# Patient Record
Sex: MALE | Race: ASIAN | NOT HISPANIC OR LATINO
[De-identification: names, ages, dates, MRNs, and addresses within clinical notes are randomized per-mention and may not be internally consistent; named-entity substitution may affect disease eponyms.]

---

## 2017-01-06 ENCOUNTER — RX RENEWAL (OUTPATIENT)
Age: 67
End: 2017-01-06

## 2017-02-15 ENCOUNTER — APPOINTMENT (OUTPATIENT)
Dept: NEPHROLOGY | Facility: CLINIC | Age: 67
End: 2017-02-15

## 2017-02-15 VITALS
WEIGHT: 152.42 LBS | BODY MASS INDEX: 25.4 KG/M2 | SYSTOLIC BLOOD PRESSURE: 132 MMHG | DIASTOLIC BLOOD PRESSURE: 76 MMHG | OXYGEN SATURATION: 99 % | HEART RATE: 61 BPM | HEIGHT: 65 IN

## 2017-02-16 LAB
APPEARANCE: CLEAR
BACTERIA: NEGATIVE
BILIRUBIN URINE: NEGATIVE
BLOOD URINE: NEGATIVE
COLOR: YELLOW
CREAT SPEC-SCNC: 33 MG/DL
CREAT/PROT UR: 0.2 RATIO
GLUCOSE QUALITATIVE U: NORMAL MG/DL
KETONES URINE: NEGATIVE
LEUKOCYTE ESTERASE URINE: NEGATIVE
MICROSCOPIC-UA: NORMAL
NITRITE URINE: NEGATIVE
PH URINE: 6.5
PROT UR-MCNC: 6 MG/DL
PROTEIN URINE: NEGATIVE MG/DL
RED BLOOD CELLS URINE: 1 /HPF
SPECIFIC GRAVITY URINE: 1.01
SQUAMOUS EPITHELIAL CELLS: 0 /HPF
UROBILINOGEN URINE: NORMAL MG/DL
WHITE BLOOD CELLS URINE: 0 /HPF

## 2017-02-21 ENCOUNTER — MEDICATION RENEWAL (OUTPATIENT)
Age: 67
End: 2017-02-21

## 2017-03-02 ENCOUNTER — APPOINTMENT (OUTPATIENT)
Dept: NEPHROLOGY | Facility: CLINIC | Age: 67
End: 2017-03-02

## 2017-04-05 ENCOUNTER — RX RENEWAL (OUTPATIENT)
Age: 67
End: 2017-04-05

## 2017-05-15 ENCOUNTER — APPOINTMENT (OUTPATIENT)
Dept: INFECTIOUS DISEASE | Facility: CLINIC | Age: 67
End: 2017-05-15

## 2017-05-15 VITALS
HEIGHT: 65 IN | WEIGHT: 156 LBS | HEART RATE: 66 BPM | OXYGEN SATURATION: 95 % | TEMPERATURE: 97.9 F | SYSTOLIC BLOOD PRESSURE: 134 MMHG | DIASTOLIC BLOOD PRESSURE: 77 MMHG | BODY MASS INDEX: 25.99 KG/M2 | RESPIRATION RATE: 16 BRPM

## 2017-05-18 ENCOUNTER — APPOINTMENT (OUTPATIENT)
Dept: INFECTIOUS DISEASE | Facility: CLINIC | Age: 67
End: 2017-05-18

## 2017-05-18 ENCOUNTER — MED ADMIN CHARGE (OUTPATIENT)
Age: 67
End: 2017-05-18

## 2017-05-18 LAB
25(OH)D3 SERPL-MCNC: 30.7 NG/ML
ALBUMIN SERPL ELPH-MCNC: 4.2 G/DL
ALP BLD-CCNC: 36 U/L
ALT SERPL-CCNC: 7 U/L
ANION GAP SERPL CALC-SCNC: 18 MMOL/L
APPEARANCE: CLEAR
AST SERPL-CCNC: 12 U/L
BACTERIA: NEGATIVE
BASOPHILS # BLD AUTO: 0.02 K/UL
BASOPHILS NFR BLD AUTO: 0.4 %
BILIRUB SERPL-MCNC: 2.3 MG/DL
BILIRUBIN URINE: NEGATIVE
BLOOD URINE: NEGATIVE
BUN SERPL-MCNC: 34 MG/DL
CALCIUM SERPL-MCNC: 9.8 MG/DL
CHLORIDE SERPL-SCNC: 102 MMOL/L
CHOLEST SERPL-MCNC: 191 MG/DL
CHOLEST/HDLC SERPL: 4.2 RATIO
CO2 SERPL-SCNC: 20 MMOL/L
COLOR: YELLOW
CREAT SERPL-MCNC: 1.68 MG/DL
CREAT SPEC-SCNC: 110 MG/DL
CREAT/PROT UR: 0.1 RATIO
EOSINOPHIL # BLD AUTO: 0.3 K/UL
EOSINOPHIL NFR BLD AUTO: 5.8 %
GLUCOSE QUALITATIVE U: NORMAL MG/DL
GLUCOSE SERPL-MCNC: 94 MG/DL
HBA1C MFR BLD HPLC: 6.2 %
HCT VFR BLD CALC: 42.4 %
HDLC SERPL-MCNC: 46 MG/DL
HGB BLD-MCNC: 13.7 G/DL
HYALINE CASTS: 1 /LPF
IMM GRANULOCYTES NFR BLD AUTO: 0.2 %
INR PPP: 1.66 RATIO
KETONES URINE: NEGATIVE
LDLC SERPL CALC-MCNC: 94 MG/DL
LEUKOCYTE ESTERASE URINE: NEGATIVE
LYMPHOCYTES # BLD AUTO: 1.74 K/UL
LYMPHOCYTES NFR BLD AUTO: 33.9 %
MAN DIFF?: NORMAL
MCHC RBC-ENTMCNC: 31.6 PG
MCHC RBC-ENTMCNC: 32.3 GM/DL
MCV RBC AUTO: 97.7 FL
MICROSCOPIC-UA: NORMAL
MONOCYTES # BLD AUTO: 0.43 K/UL
MONOCYTES NFR BLD AUTO: 8.4 %
NEUTROPHILS # BLD AUTO: 2.64 K/UL
NEUTROPHILS NFR BLD AUTO: 51.3 %
NITRITE URINE: NEGATIVE
PH URINE: 5.5
PHOSPHATE SERPL-MCNC: 2.6 MG/DL
PLATELET # BLD AUTO: 205 K/UL
POTASSIUM SERPL-SCNC: 4.2 MMOL/L
PROT SERPL-MCNC: 7.3 G/DL
PROT UR-MCNC: 15 MG/DL
PROTEIN URINE: ABNORMAL MG/DL
PT BLD: 18.9 SEC
RBC # BLD: 4.34 M/UL
RBC # FLD: 13.8 %
RED BLOOD CELLS URINE: 2 /HPF
SODIUM SERPL-SCNC: 140 MMOL/L
SPECIFIC GRAVITY URINE: 1.02
SQUAMOUS EPITHELIAL CELLS: 0 /HPF
TRIGL SERPL-MCNC: 253 MG/DL
URATE SERPL-MCNC: 5.9 MG/DL
UROBILINOGEN URINE: NORMAL MG/DL
WBC # FLD AUTO: 5.14 K/UL
WHITE BLOOD CELLS URINE: 1 /HPF

## 2017-05-19 ENCOUNTER — RESULT REVIEW (OUTPATIENT)
Age: 67
End: 2017-05-19

## 2017-05-19 LAB
C TRACH RRNA SPEC QL NAA+PROBE: NORMAL
CD3 CELLS # BLD: 1226 /UL
CD3 CELLS NFR BLD: 68 %
CD3+CD4+ CELLS # BLD: 509 /UL
CD3+CD4+ CELLS NFR BLD: 28 %
CD3+CD4+ CELLS/CD3+CD8+ CLL SPEC: 0.72 RATIO
CD3+CD8+ CELLS # SPEC: 705 /UL
CD3+CD8+ CELLS NFR BLD: 39 %
HCV AB SER QL: NONREACTIVE
HCV S/CO RATIO: 0.15 S/CO
N GONORRHOEA RRNA SPEC QL NAA+PROBE: NORMAL
PSA SERPL-MCNC: 0.59 NG/ML
SOURCE AMPLIFICATION: NORMAL
T PALLIDUM AB SER QL IA: NEGATIVE

## 2017-05-22 LAB
ADJUSTED MITOGEN: >10 IU/ML
ADJUSTED TB AG: 0.02 IU/ML
HIV1 RNA # SERPL NAA+PROBE: NOT DETECTED COPIES/ML
M TB IFN-G BLD-IMP: NEGATIVE
QUANTIFERON GOLD NIL: 0.11 IU/ML
VIRAL LOAD LOG: NOT DETECTED LG10COP/ML

## 2017-05-31 ENCOUNTER — TRANSCRIPTION ENCOUNTER (OUTPATIENT)
Age: 67
End: 2017-05-31

## 2017-06-14 ENCOUNTER — RESULT REVIEW (OUTPATIENT)
Age: 67
End: 2017-06-14

## 2017-06-15 ENCOUNTER — APPOINTMENT (OUTPATIENT)
Dept: ELECTROPHYSIOLOGY | Facility: CLINIC | Age: 67
End: 2017-06-15

## 2017-06-15 ENCOUNTER — NON-APPOINTMENT (OUTPATIENT)
Age: 67
End: 2017-06-15

## 2017-06-15 ENCOUNTER — APPOINTMENT (OUTPATIENT)
Dept: CARDIOLOGY | Facility: CLINIC | Age: 67
End: 2017-06-15

## 2017-06-15 VITALS
HEIGHT: 65 IN | WEIGHT: 155 LBS | HEART RATE: 66 BPM | OXYGEN SATURATION: 98 % | BODY MASS INDEX: 25.83 KG/M2 | DIASTOLIC BLOOD PRESSURE: 87 MMHG | SYSTOLIC BLOOD PRESSURE: 149 MMHG

## 2017-06-15 RX ORDER — CICLOPIROX 80 MG/ML
8 SOLUTION TOPICAL
Qty: 6 | Refills: 0 | Status: DISCONTINUED | COMMUNITY
Start: 2017-04-18

## 2017-06-15 RX ORDER — CLOBETASOL PROPIONATE 0.5 MG/G
0.05 OINTMENT TOPICAL
Qty: 60 | Refills: 0 | Status: DISCONTINUED | COMMUNITY
Start: 2017-01-12

## 2017-06-16 ENCOUNTER — TRANSCRIPTION ENCOUNTER (OUTPATIENT)
Age: 67
End: 2017-06-16

## 2017-06-16 ENCOUNTER — MEDICATION RENEWAL (OUTPATIENT)
Age: 67
End: 2017-06-16

## 2017-08-10 ENCOUNTER — RX RENEWAL (OUTPATIENT)
Age: 67
End: 2017-08-10

## 2017-08-17 ENCOUNTER — APPOINTMENT (OUTPATIENT)
Dept: NEPHROLOGY | Facility: CLINIC | Age: 67
End: 2017-08-17
Payer: COMMERCIAL

## 2017-08-17 VITALS — SYSTOLIC BLOOD PRESSURE: 144 MMHG | DIASTOLIC BLOOD PRESSURE: 70 MMHG

## 2017-08-17 VITALS
WEIGHT: 157.63 LBS | HEIGHT: 65 IN | OXYGEN SATURATION: 98 % | SYSTOLIC BLOOD PRESSURE: 153 MMHG | BODY MASS INDEX: 26.26 KG/M2 | DIASTOLIC BLOOD PRESSURE: 75 MMHG | HEART RATE: 62 BPM

## 2017-08-17 PROCEDURE — 99215 OFFICE O/P EST HI 40 MIN: CPT

## 2017-09-07 ENCOUNTER — RX RENEWAL (OUTPATIENT)
Age: 67
End: 2017-09-07

## 2017-09-18 ENCOUNTER — APPOINTMENT (OUTPATIENT)
Dept: INFECTIOUS DISEASE | Facility: CLINIC | Age: 67
End: 2017-09-18
Payer: COMMERCIAL

## 2017-09-18 VITALS
WEIGHT: 150 LBS | RESPIRATION RATE: 16 BRPM | OXYGEN SATURATION: 98 % | TEMPERATURE: 97.5 F | HEIGHT: 65 IN | DIASTOLIC BLOOD PRESSURE: 83 MMHG | HEART RATE: 82 BPM | BODY MASS INDEX: 24.99 KG/M2 | SYSTOLIC BLOOD PRESSURE: 136 MMHG

## 2017-09-18 PROCEDURE — 99214 OFFICE O/P EST MOD 30 MIN: CPT

## 2017-09-18 RX ORDER — CEFPODOXIME PROXETIL 200 MG/1
200 TABLET, FILM COATED ORAL
Qty: 20 | Refills: 0 | Status: COMPLETED | COMMUNITY
Start: 2017-08-10

## 2017-09-19 LAB
25(OH)D3 SERPL-MCNC: 29.9 NG/ML
ALBUMIN SERPL ELPH-MCNC: 4.4 G/DL
ALP BLD-CCNC: 41 U/L
ALT SERPL-CCNC: 13 U/L
ANION GAP SERPL CALC-SCNC: 15 MMOL/L
APPEARANCE: CLEAR
AST SERPL-CCNC: 25 U/L
BACTERIA: NEGATIVE
BASOPHILS # BLD AUTO: 0.03 K/UL
BASOPHILS NFR BLD AUTO: 0.6 %
BILIRUB SERPL-MCNC: 2 MG/DL
BILIRUBIN URINE: NEGATIVE
BLOOD URINE: ABNORMAL
BUN SERPL-MCNC: 37 MG/DL
CALCIUM SERPL-MCNC: 10 MG/DL
CD3 CELLS # BLD: 1161 /UL
CD3 CELLS NFR BLD: 71 %
CD3+CD4+ CELLS # BLD: 489 /UL
CD3+CD4+ CELLS NFR BLD: 30 %
CD3+CD4+ CELLS/CD3+CD8+ CLL SPEC: 0.74 RATIO
CD3+CD8+ CELLS # SPEC: 663 /UL
CD3+CD8+ CELLS NFR BLD: 40 %
CHLORIDE SERPL-SCNC: 100 MMOL/L
CO2 SERPL-SCNC: 25 MMOL/L
COLOR: YELLOW
CREAT SERPL-MCNC: 1.75 MG/DL
CREAT SPEC-SCNC: 41 MG/DL
CREAT/PROT UR: 0.3 RATIO
EOSINOPHIL # BLD AUTO: 0.16 K/UL
EOSINOPHIL NFR BLD AUTO: 3.2 %
GLUCOSE QUALITATIVE U: NORMAL MG/DL
GLUCOSE SERPL-MCNC: 91 MG/DL
HBA1C MFR BLD HPLC: 5.8 %
HCT VFR BLD CALC: 46.9 %
HGB BLD-MCNC: 15.7 G/DL
HIV1 RNA # SERPL NAA+PROBE: NORMAL
HIV1 RNA # SERPL NAA+PROBE: NOT DETECTED COPIES/ML
IMM GRANULOCYTES NFR BLD AUTO: 0.2 %
INR PPP: 2.71 RATIO
KETONES URINE: NEGATIVE
LEUKOCYTE ESTERASE URINE: NEGATIVE
LYMPHOCYTES # BLD AUTO: 1.62 K/UL
LYMPHOCYTES NFR BLD AUTO: 32.2 %
MAN DIFF?: NORMAL
MCHC RBC-ENTMCNC: 31.3 PG
MCHC RBC-ENTMCNC: 33.5 GM/DL
MCV RBC AUTO: 93.4 FL
MICROSCOPIC-UA: NORMAL
MONOCYTES # BLD AUTO: 0.51 K/UL
MONOCYTES NFR BLD AUTO: 10.1 %
NEUTROPHILS # BLD AUTO: 2.7 K/UL
NEUTROPHILS NFR BLD AUTO: 53.7 %
NITRITE URINE: NEGATIVE
PH URINE: 6.5
PHOSPHATE SERPL-MCNC: 3.5 MG/DL
PLATELET # BLD AUTO: 243 K/UL
POTASSIUM SERPL-SCNC: 4.3 MMOL/L
PROT SERPL-MCNC: 7.8 G/DL
PROT UR-MCNC: 14 MG/DL
PROTEIN URINE: NEGATIVE MG/DL
PT BLD: 31.2 SEC
RBC # BLD: 5.02 M/UL
RBC # FLD: 12.5 %
RED BLOOD CELLS URINE: 1 /HPF
SODIUM SERPL-SCNC: 140 MMOL/L
SPECIFIC GRAVITY URINE: 1.01
SQUAMOUS EPITHELIAL CELLS: 0 /HPF
UROBILINOGEN URINE: NORMAL MG/DL
VIRAL LOAD INTERP: NORMAL
VIRAL LOAD LOG: NOT DETECTED LG COP/ML
WBC # FLD AUTO: 5.03 K/UL
WHITE BLOOD CELLS URINE: 0 /HPF

## 2017-10-06 ENCOUNTER — RX RENEWAL (OUTPATIENT)
Age: 67
End: 2017-10-06

## 2017-10-06 ENCOUNTER — MEDICATION RENEWAL (OUTPATIENT)
Age: 67
End: 2017-10-06

## 2017-10-13 ENCOUNTER — RX RENEWAL (OUTPATIENT)
Age: 67
End: 2017-10-13

## 2017-11-16 ENCOUNTER — RX RENEWAL (OUTPATIENT)
Age: 67
End: 2017-11-16

## 2017-11-27 ENCOUNTER — RX RENEWAL (OUTPATIENT)
Age: 67
End: 2017-11-27

## 2017-11-30 ENCOUNTER — RX RENEWAL (OUTPATIENT)
Age: 67
End: 2017-11-30

## 2017-12-07 ENCOUNTER — NON-APPOINTMENT (OUTPATIENT)
Age: 67
End: 2017-12-07

## 2017-12-07 ENCOUNTER — APPOINTMENT (OUTPATIENT)
Dept: ELECTROPHYSIOLOGY | Facility: CLINIC | Age: 67
End: 2017-12-07
Payer: COMMERCIAL

## 2017-12-07 VITALS
SYSTOLIC BLOOD PRESSURE: 128 MMHG | HEART RATE: 66 BPM | BODY MASS INDEX: 25.79 KG/M2 | DIASTOLIC BLOOD PRESSURE: 79 MMHG | WEIGHT: 155 LBS | OXYGEN SATURATION: 98 %

## 2017-12-07 PROCEDURE — 93280 PM DEVICE PROGR EVAL DUAL: CPT

## 2017-12-26 ENCOUNTER — RX RENEWAL (OUTPATIENT)
Age: 67
End: 2017-12-26

## 2018-01-10 ENCOUNTER — APPOINTMENT (OUTPATIENT)
Dept: CARDIOLOGY | Facility: CLINIC | Age: 68
End: 2018-01-10
Payer: COMMERCIAL

## 2018-01-10 ENCOUNTER — NON-APPOINTMENT (OUTPATIENT)
Age: 68
End: 2018-01-10

## 2018-01-10 VITALS
HEIGHT: 65 IN | BODY MASS INDEX: 25.83 KG/M2 | DIASTOLIC BLOOD PRESSURE: 79 MMHG | WEIGHT: 155 LBS | SYSTOLIC BLOOD PRESSURE: 150 MMHG | OXYGEN SATURATION: 96 % | HEART RATE: 66 BPM | RESPIRATION RATE: 16 BRPM

## 2018-01-10 PROCEDURE — 93000 ELECTROCARDIOGRAM COMPLETE: CPT

## 2018-01-10 PROCEDURE — 99214 OFFICE O/P EST MOD 30 MIN: CPT

## 2018-01-11 ENCOUNTER — NON-APPOINTMENT (OUTPATIENT)
Age: 68
End: 2018-01-11

## 2018-01-16 ENCOUNTER — MEDICATION RENEWAL (OUTPATIENT)
Age: 68
End: 2018-01-16

## 2018-01-16 ENCOUNTER — RX RENEWAL (OUTPATIENT)
Age: 68
End: 2018-01-16

## 2018-01-17 ENCOUNTER — MEDICATION RENEWAL (OUTPATIENT)
Age: 68
End: 2018-01-17

## 2018-02-05 ENCOUNTER — APPOINTMENT (OUTPATIENT)
Dept: PHARMACY | Facility: CLINIC | Age: 68
End: 2018-02-05
Payer: SELF-PAY

## 2018-02-05 PROCEDURE — V5299A: CUSTOM | Mod: NC

## 2018-02-15 ENCOUNTER — APPOINTMENT (OUTPATIENT)
Dept: NEPHROLOGY | Facility: CLINIC | Age: 68
End: 2018-02-15
Payer: COMMERCIAL

## 2018-02-15 VITALS
WEIGHT: 156.53 LBS | BODY MASS INDEX: 26.08 KG/M2 | HEART RATE: 63 BPM | HEIGHT: 65 IN | OXYGEN SATURATION: 98 % | DIASTOLIC BLOOD PRESSURE: 71 MMHG | SYSTOLIC BLOOD PRESSURE: 134 MMHG

## 2018-02-15 VITALS — SYSTOLIC BLOOD PRESSURE: 128 MMHG | DIASTOLIC BLOOD PRESSURE: 80 MMHG

## 2018-02-15 LAB
ALBUMIN SERPL ELPH-MCNC: 4.3 G/DL
ANION GAP SERPL CALC-SCNC: 19 MMOL/L
APPEARANCE: CLEAR
BACTERIA: NEGATIVE
BASOPHILS # BLD AUTO: 0.02 K/UL
BASOPHILS NFR BLD AUTO: 0.3 %
BILIRUBIN URINE: NEGATIVE
BLOOD URINE: ABNORMAL
BUN SERPL-MCNC: 33 MG/DL
CALCIUM SERPL-MCNC: 9.7 MG/DL
CALCIUM SERPL-MCNC: 9.7 MG/DL
CHLORIDE SERPL-SCNC: 102 MMOL/L
CO2 SERPL-SCNC: 21 MMOL/L
COLOR: YELLOW
CREAT SERPL-MCNC: 1.66 MG/DL
EOSINOPHIL # BLD AUTO: 0.24 K/UL
EOSINOPHIL NFR BLD AUTO: 3.1 %
GLUCOSE QUALITATIVE U: NEGATIVE MG/DL
GLUCOSE SERPL-MCNC: 192 MG/DL
HCT VFR BLD CALC: 44.5 %
HGB BLD-MCNC: 14.5 G/DL
HYALINE CASTS: 1 /LPF
IMM GRANULOCYTES NFR BLD AUTO: 0.1 %
KETONES URINE: NEGATIVE
LEUKOCYTE ESTERASE URINE: NEGATIVE
LYMPHOCYTES # BLD AUTO: 1.51 K/UL
LYMPHOCYTES NFR BLD AUTO: 19.4 %
MAN DIFF?: NORMAL
MCHC RBC-ENTMCNC: 30.5 PG
MCHC RBC-ENTMCNC: 32.6 GM/DL
MCV RBC AUTO: 93.7 FL
MICROSCOPIC-UA: NORMAL
MONOCYTES # BLD AUTO: 0.48 K/UL
MONOCYTES NFR BLD AUTO: 6.2 %
NEUTROPHILS # BLD AUTO: 5.53 K/UL
NEUTROPHILS NFR BLD AUTO: 70.9 %
NITRITE URINE: NEGATIVE
PARATHYROID HORMONE INTACT: 19 PG/ML
PH URINE: 6
PHOSPHATE SERPL-MCNC: 3.2 MG/DL
PLATELET # BLD AUTO: 206 K/UL
POTASSIUM SERPL-SCNC: 4.5 MMOL/L
PROTEIN URINE: 30 MG/DL
RBC # BLD: 4.75 M/UL
RBC # FLD: 13.3 %
RED BLOOD CELLS URINE: 1 /HPF
SODIUM SERPL-SCNC: 142 MMOL/L
SPECIFIC GRAVITY URINE: 1.02
SQUAMOUS EPITHELIAL CELLS: 0 /HPF
URATE SERPL-MCNC: 5.6 MG/DL
UROBILINOGEN URINE: 1 MG/DL
WBC # FLD AUTO: 7.79 K/UL
WHITE BLOOD CELLS URINE: 1 /HPF

## 2018-02-15 PROCEDURE — 99214 OFFICE O/P EST MOD 30 MIN: CPT

## 2018-02-16 LAB
25(OH)D3 SERPL-MCNC: 32.6 NG/ML
CREAT SPEC-SCNC: 87 MG/DL
CREAT/PROT UR: 0.4 RATIO
PROT UR-MCNC: 35 MG/DL

## 2018-02-22 ENCOUNTER — MEDICATION RENEWAL (OUTPATIENT)
Age: 68
End: 2018-02-22

## 2018-02-22 ENCOUNTER — RX RENEWAL (OUTPATIENT)
Age: 68
End: 2018-02-22

## 2018-03-01 ENCOUNTER — FORM ENCOUNTER (OUTPATIENT)
Age: 68
End: 2018-03-01

## 2018-03-02 ENCOUNTER — APPOINTMENT (OUTPATIENT)
Dept: ULTRASOUND IMAGING | Facility: CLINIC | Age: 68
End: 2018-03-02
Payer: COMMERCIAL

## 2018-03-02 ENCOUNTER — OUTPATIENT (OUTPATIENT)
Dept: OUTPATIENT SERVICES | Facility: HOSPITAL | Age: 68
LOS: 1 days | End: 2018-03-02
Payer: COMMERCIAL

## 2018-03-02 DIAGNOSIS — Z00.8 ENCOUNTER FOR OTHER GENERAL EXAMINATION: ICD-10-CM

## 2018-03-02 PROCEDURE — 76775 US EXAM ABDO BACK WALL LIM: CPT | Mod: 26

## 2018-03-02 PROCEDURE — 76775 US EXAM ABDO BACK WALL LIM: CPT

## 2018-03-19 ENCOUNTER — MEDICATION RENEWAL (OUTPATIENT)
Age: 68
End: 2018-03-19

## 2018-04-12 ENCOUNTER — APPOINTMENT (OUTPATIENT)
Dept: PHARMACY | Facility: CLINIC | Age: 68
End: 2018-04-12
Payer: SELF-PAY

## 2018-04-12 ENCOUNTER — APPOINTMENT (OUTPATIENT)
Dept: SPEECH THERAPY | Facility: CLINIC | Age: 68
End: 2018-04-12

## 2018-04-12 PROCEDURE — V5267D: CUSTOM

## 2018-04-19 ENCOUNTER — OUTPATIENT (OUTPATIENT)
Dept: OUTPATIENT SERVICES | Facility: HOSPITAL | Age: 68
LOS: 1 days | Discharge: HOME | End: 2018-04-19

## 2018-04-19 ENCOUNTER — APPOINTMENT (OUTPATIENT)
Dept: SPEECH THERAPY | Facility: CLINIC | Age: 68
End: 2018-04-19

## 2018-04-23 ENCOUNTER — APPOINTMENT (OUTPATIENT)
Dept: INFECTIOUS DISEASE | Facility: CLINIC | Age: 68
End: 2018-04-23
Payer: COMMERCIAL

## 2018-04-23 ENCOUNTER — APPOINTMENT (OUTPATIENT)
Dept: INFECTIOUS DISEASE | Facility: CLINIC | Age: 68
End: 2018-04-23

## 2018-04-23 VITALS
HEIGHT: 65 IN | OXYGEN SATURATION: 97 % | DIASTOLIC BLOOD PRESSURE: 76 MMHG | TEMPERATURE: 97.5 F | BODY MASS INDEX: 26.33 KG/M2 | SYSTOLIC BLOOD PRESSURE: 131 MMHG | RESPIRATION RATE: 1 BRPM | WEIGHT: 158 LBS | HEART RATE: 58 BPM

## 2018-04-23 DIAGNOSIS — Z86.010 PERSONAL HISTORY OF COLONIC POLYPS: ICD-10-CM

## 2018-04-23 PROCEDURE — 99214 OFFICE O/P EST MOD 30 MIN: CPT

## 2018-04-24 LAB
25(OH)D3 SERPL-MCNC: 30.6 NG/ML
ALBUMIN SERPL ELPH-MCNC: 4.2 G/DL
ALP BLD-CCNC: 41 U/L
ALT SERPL-CCNC: 18 U/L
ANION GAP SERPL CALC-SCNC: 13 MMOL/L
APPEARANCE: CLEAR
AST SERPL-CCNC: 31 U/L
BACTERIA: NEGATIVE
BASOPHILS # BLD AUTO: 0.03 K/UL
BASOPHILS NFR BLD AUTO: 0.5 %
BILIRUB SERPL-MCNC: 2.2 MG/DL
BILIRUBIN URINE: NEGATIVE
BLOOD URINE: NEGATIVE
BUN SERPL-MCNC: 37 MG/DL
CALCIUM SERPL-MCNC: 9.7 MG/DL
CHLORIDE SERPL-SCNC: 100 MMOL/L
CHOLEST SERPL-MCNC: 181 MG/DL
CHOLEST/HDLC SERPL: 4.6 RATIO
CO2 SERPL-SCNC: 24 MMOL/L
COLOR: YELLOW
CREAT SERPL-MCNC: 1.62 MG/DL
CREAT SPEC-SCNC: 29 MG/DL
CREAT/PROT UR: 0.1 RATIO
EOSINOPHIL # BLD AUTO: 0.19 K/UL
EOSINOPHIL NFR BLD AUTO: 3.2 %
GLUCOSE QUALITATIVE U: NEGATIVE MG/DL
GLUCOSE SERPL-MCNC: 90 MG/DL
HBA1C MFR BLD HPLC: 5.9 %
HCT VFR BLD CALC: 43.9 %
HCV AB SER QL: NONREACTIVE
HCV S/CO RATIO: 0.16 S/CO
HDLC SERPL-MCNC: 39 MG/DL
HGB BLD-MCNC: 15.3 G/DL
IMM GRANULOCYTES NFR BLD AUTO: 0.2 %
INR PPP: 1.94 RATIO
KETONES URINE: NEGATIVE
LDLC SERPL CALC-MCNC: 64 MG/DL
LEUKOCYTE ESTERASE URINE: NEGATIVE
LYMPHOCYTES # BLD AUTO: 1.55 K/UL
LYMPHOCYTES NFR BLD AUTO: 26.4 %
MAN DIFF?: NORMAL
MCHC RBC-ENTMCNC: 31.4 PG
MCHC RBC-ENTMCNC: 34.9 GM/DL
MCV RBC AUTO: 90.1 FL
MICROSCOPIC-UA: NORMAL
MONOCYTES # BLD AUTO: 0.57 K/UL
MONOCYTES NFR BLD AUTO: 9.7 %
NEUTROPHILS # BLD AUTO: 3.52 K/UL
NEUTROPHILS NFR BLD AUTO: 60 %
NITRITE URINE: NEGATIVE
PH URINE: 6.5
PHOSPHATE SERPL-MCNC: 3.3 MG/DL
PLATELET # BLD AUTO: 209 K/UL
POTASSIUM SERPL-SCNC: 4.5 MMOL/L
PROT SERPL-MCNC: 7.8 G/DL
PROT UR-MCNC: 4 MG/DL
PROTEIN URINE: NEGATIVE MG/DL
PSA SERPL-MCNC: 0.62 NG/ML
PT BLD: 22.2 SEC
RBC # BLD: 4.87 M/UL
RBC # FLD: 13.1 %
RED BLOOD CELLS URINE: 0 /HPF
SODIUM SERPL-SCNC: 137 MMOL/L
SPECIFIC GRAVITY URINE: 1.01
SQUAMOUS EPITHELIAL CELLS: 0 /HPF
T PALLIDUM AB SER QL IA: NEGATIVE
TRIGL SERPL-MCNC: 388 MG/DL
URATE SERPL-MCNC: 5.4 MG/DL
UROBILINOGEN URINE: NEGATIVE MG/DL
WBC # FLD AUTO: 5.87 K/UL
WHITE BLOOD CELLS URINE: 0 /HPF

## 2018-04-26 LAB
ADJUSTED MITOGEN: >10 IU/ML
ADJUSTED TB AG: 0.11 IU/ML
CD3 CELLS # BLD: 1147 /UL
CD3 CELLS NFR BLD: 73 %
CD3+CD4+ CELLS # BLD: 577 /UL
CD3+CD4+ CELLS NFR BLD: 37 %
CD3+CD4+ CELLS/CD3+CD8+ CLL SPEC: 1.03 RATIO
CD3+CD8+ CELLS # SPEC: 563 /UL
CD3+CD8+ CELLS NFR BLD: 36 %
CYSTATIN C SERPL-MCNC: 1.8 MG/L
GFR/BSA.PRED SERPLBLD CYS-BASED-ARV: 34
HIV1 RNA # SERPL NAA+PROBE: NORMAL
HIV1 RNA # SERPL NAA+PROBE: NORMAL COPIES/ML
M TB IFN-G BLD-IMP: NEGATIVE
QUANTIFERON GOLD NIL: 0.07 IU/ML
VIRAL LOAD INTERP: NORMAL
VIRAL LOAD LOG: NORMAL LG COP/ML

## 2018-05-03 ENCOUNTER — APPOINTMENT (OUTPATIENT)
Dept: PHARMACY | Facility: CLINIC | Age: 68
End: 2018-05-03
Payer: SELF-PAY

## 2018-05-03 PROCEDURE — V5299A: CUSTOM | Mod: NC

## 2018-05-07 ENCOUNTER — MEDICATION RENEWAL (OUTPATIENT)
Age: 68
End: 2018-05-07

## 2018-05-07 DIAGNOSIS — H90.A21 SENSORINEURAL HEARING LOSS, UNILATERAL, RIGHT EAR, WITH RESTRICTED HEARING ON THE CONTRALATERAL SIDE: ICD-10-CM

## 2018-06-07 ENCOUNTER — APPOINTMENT (OUTPATIENT)
Dept: ELECTROPHYSIOLOGY | Facility: CLINIC | Age: 68
End: 2018-06-07
Payer: COMMERCIAL

## 2018-06-07 VITALS
HEART RATE: 73 BPM | OXYGEN SATURATION: 98 % | HEIGHT: 65 IN | DIASTOLIC BLOOD PRESSURE: 71 MMHG | WEIGHT: 156 LBS | SYSTOLIC BLOOD PRESSURE: 117 MMHG | BODY MASS INDEX: 25.99 KG/M2

## 2018-06-07 PROCEDURE — 93280 PM DEVICE PROGR EVAL DUAL: CPT

## 2018-06-27 ENCOUNTER — APPOINTMENT (OUTPATIENT)
Dept: CARDIOLOGY | Facility: CLINIC | Age: 68
End: 2018-06-27

## 2018-07-25 ENCOUNTER — APPOINTMENT (OUTPATIENT)
Dept: CARDIOLOGY | Facility: CLINIC | Age: 68
End: 2018-07-25
Payer: COMMERCIAL

## 2018-08-13 ENCOUNTER — MEDICATION RENEWAL (OUTPATIENT)
Age: 68
End: 2018-08-13

## 2018-08-14 ENCOUNTER — RX RENEWAL (OUTPATIENT)
Age: 68
End: 2018-08-14

## 2018-08-15 ENCOUNTER — TRANSCRIPTION ENCOUNTER (OUTPATIENT)
Age: 68
End: 2018-08-15

## 2018-08-16 ENCOUNTER — RX RENEWAL (OUTPATIENT)
Age: 68
End: 2018-08-16

## 2018-08-16 ENCOUNTER — TRANSCRIPTION ENCOUNTER (OUTPATIENT)
Age: 68
End: 2018-08-16

## 2018-09-12 ENCOUNTER — APPOINTMENT (OUTPATIENT)
Dept: CARDIOLOGY | Facility: CLINIC | Age: 68
End: 2018-09-12
Payer: COMMERCIAL

## 2018-09-12 ENCOUNTER — NON-APPOINTMENT (OUTPATIENT)
Age: 68
End: 2018-09-12

## 2018-09-12 VITALS
RESPIRATION RATE: 16 BRPM | SYSTOLIC BLOOD PRESSURE: 122 MMHG | OXYGEN SATURATION: 98 % | HEART RATE: 67 BPM | DIASTOLIC BLOOD PRESSURE: 78 MMHG | BODY MASS INDEX: 26.16 KG/M2 | WEIGHT: 157 LBS | HEIGHT: 65 IN

## 2018-09-12 PROCEDURE — 99214 OFFICE O/P EST MOD 30 MIN: CPT

## 2018-09-12 PROCEDURE — 93000 ELECTROCARDIOGRAM COMPLETE: CPT

## 2018-09-13 ENCOUNTER — LABORATORY RESULT (OUTPATIENT)
Age: 68
End: 2018-09-13

## 2018-09-13 ENCOUNTER — APPOINTMENT (OUTPATIENT)
Dept: NEPHROLOGY | Facility: CLINIC | Age: 68
End: 2018-09-13
Payer: COMMERCIAL

## 2018-09-13 VITALS
WEIGHT: 155 LBS | HEART RATE: 63 BPM | RESPIRATION RATE: 16 BRPM | SYSTOLIC BLOOD PRESSURE: 102 MMHG | HEIGHT: 65 IN | OXYGEN SATURATION: 98 % | BODY MASS INDEX: 25.83 KG/M2 | DIASTOLIC BLOOD PRESSURE: 60 MMHG

## 2018-09-13 LAB
ALBUMIN SERPL ELPH-MCNC: 4.6 G/DL
ALP BLD-CCNC: 38 U/L
ALT SERPL-CCNC: 49 U/L
ANION GAP SERPL CALC-SCNC: 12 MMOL/L
APPEARANCE: CLEAR
APTT BLD: 51.8 SEC
AST SERPL-CCNC: 48 U/L
BACTERIA: NEGATIVE
BASOPHILS # BLD AUTO: 0.04 K/UL
BASOPHILS NFR BLD AUTO: 0.8 %
BILIRUB SERPL-MCNC: 0.4 MG/DL
BILIRUBIN URINE: NEGATIVE
BLOOD URINE: NEGATIVE
BUN SERPL-MCNC: 38 MG/DL
CALCIUM SERPL-MCNC: 10 MG/DL
CHLORIDE SERPL-SCNC: 103 MMOL/L
CO2 SERPL-SCNC: 26 MMOL/L
COLOR: YELLOW
CREAT SERPL-MCNC: 1.59 MG/DL
CREAT SPEC-SCNC: 31 MG/DL
CREAT/PROT UR: 0.5 RATIO
EOSINOPHIL # BLD AUTO: 0.12 K/UL
EOSINOPHIL NFR BLD AUTO: 2.3 %
GLUCOSE QUALITATIVE U: 250 MG/DL
GLUCOSE SERPL-MCNC: 76 MG/DL
HCT VFR BLD CALC: 46.5 %
HGB BLD-MCNC: 15.2 G/DL
IMM GRANULOCYTES NFR BLD AUTO: 0.2 %
INR PPP: 2.37 RATIO
KETONES URINE: NEGATIVE
LEUKOCYTE ESTERASE URINE: NEGATIVE
LYMPHOCYTES # BLD AUTO: 1.54 K/UL
LYMPHOCYTES NFR BLD AUTO: 30.1 %
MAN DIFF?: NORMAL
MCHC RBC-ENTMCNC: 30.7 PG
MCHC RBC-ENTMCNC: 32.7 GM/DL
MCV RBC AUTO: 93.9 FL
MICROSCOPIC-UA: NORMAL
MONOCYTES # BLD AUTO: 0.44 K/UL
MONOCYTES NFR BLD AUTO: 8.6 %
NEUTROPHILS # BLD AUTO: 2.97 K/UL
NEUTROPHILS NFR BLD AUTO: 58 %
NITRITE URINE: NEGATIVE
PH URINE: 6.5
PHOSPHATE 24H UR-MCNC: 15.4 MG/DL
PLATELET # BLD AUTO: 213 K/UL
POTASSIUM SERPL-SCNC: 4.1 MMOL/L
PROT SERPL-MCNC: 7.5 G/DL
PROT UR-MCNC: 14 MG/DL
PROTEIN URINE: NEGATIVE MG/DL
PT BLD: 27.3 SEC
RBC # BLD: 4.95 M/UL
RBC # FLD: 13.9 %
RED BLOOD CELLS URINE: 1 /HPF
SODIUM SERPL-SCNC: 141 MMOL/L
SPECIFIC GRAVITY URINE: 1.01
SQUAMOUS EPITHELIAL CELLS: 0 /HPF
UROBILINOGEN URINE: NEGATIVE MG/DL
WBC # FLD AUTO: 5.12 K/UL
WHITE BLOOD CELLS URINE: 0 /HPF

## 2018-09-13 PROCEDURE — 99214 OFFICE O/P EST MOD 30 MIN: CPT

## 2018-09-13 RX ORDER — ATAZANAVIR 300 MG/1
300 CAPSULE ORAL
Qty: 30 | Refills: 0 | Status: DISCONTINUED | COMMUNITY
Start: 2018-01-16 | End: 2018-09-13

## 2018-09-14 LAB
25(OH)D3 SERPL-MCNC: 38.9 NG/ML
CALCIUM SERPL-MCNC: 10 MG/DL
CYSTATIN C SERPL-MCNC: 1.5 MG/L
GFR/BSA.PRED SERPLBLD CYS-BASED-ARV: 44 ML/MIN
PARATHYROID HORMONE INTACT: 13 PG/ML

## 2018-09-17 ENCOUNTER — CLINICAL ADVICE (OUTPATIENT)
Age: 68
End: 2018-09-17

## 2018-11-21 ENCOUNTER — APPOINTMENT (OUTPATIENT)
Dept: ENDOCRINOLOGY | Facility: CLINIC | Age: 68
End: 2018-11-21
Payer: COMMERCIAL

## 2018-11-21 VITALS
HEIGHT: 65 IN | DIASTOLIC BLOOD PRESSURE: 60 MMHG | HEART RATE: 58 BPM | WEIGHT: 152 LBS | BODY MASS INDEX: 25.33 KG/M2 | OXYGEN SATURATION: 98 % | SYSTOLIC BLOOD PRESSURE: 100 MMHG

## 2018-11-21 DIAGNOSIS — Z87.19 PERSONAL HISTORY OF OTHER DISEASES OF THE DIGESTIVE SYSTEM: ICD-10-CM

## 2018-11-21 PROCEDURE — 99204 OFFICE O/P NEW MOD 45 MIN: CPT | Mod: 25

## 2018-11-21 PROCEDURE — 36415 COLL VENOUS BLD VENIPUNCTURE: CPT

## 2018-11-23 PROBLEM — Z87.19 HISTORY OF CHOLANGITIS: Status: RESOLVED | Noted: 2018-11-23 | Resolved: 2018-11-23

## 2018-11-26 ENCOUNTER — APPOINTMENT (OUTPATIENT)
Dept: INFECTIOUS DISEASE | Facility: CLINIC | Age: 68
End: 2018-11-26

## 2018-11-29 DIAGNOSIS — R79.89 OTHER SPECIFIED ABNORMAL FINDINGS OF BLOOD CHEMISTRY: ICD-10-CM

## 2018-11-29 LAB
ALBUMIN SERPL ELPH-MCNC: 4.3 G/DL
ALP BLD-CCNC: 35 U/L
ALT SERPL-CCNC: 24 U/L
ANION GAP SERPL CALC-SCNC: 15 MMOL/L
AST SERPL-CCNC: 29 U/L
BASOPHILS # BLD AUTO: 0.02 K/UL
BASOPHILS NFR BLD AUTO: 0.4 %
BILIRUB SERPL-MCNC: 0.3 MG/DL
BUN SERPL-MCNC: 37 MG/DL
CALCIUM SERPL-MCNC: 9.3 MG/DL
CHLORIDE SERPL-SCNC: 105 MMOL/L
CO2 SERPL-SCNC: 22 MMOL/L
CREAT SERPL-MCNC: 1.7 MG/DL
EOSINOPHIL # BLD AUTO: 0.14 K/UL
EOSINOPHIL NFR BLD AUTO: 2.8 %
ESTRADIOL SERPL-MCNC: 52 PG/ML
FSH SERPL-MCNC: 25.5 IU/L
GLUCOSE SERPL-MCNC: 102 MG/DL
HBA1C MFR BLD HPLC: 5.9 %
HCT VFR BLD CALC: 46.7 %
HGB BLD-MCNC: 15.2 G/DL
IMM GRANULOCYTES NFR BLD AUTO: 0.4 %
INR PPP: 2.92 RATIO
LH SERPL-ACNC: 21.5 IU/L
LYMPHOCYTES # BLD AUTO: 1.54 K/UL
LYMPHOCYTES NFR BLD AUTO: 31.2 %
MAN DIFF?: NORMAL
MCHC RBC-ENTMCNC: 31.3 PG
MCHC RBC-ENTMCNC: 32.5 GM/DL
MCV RBC AUTO: 96.1 FL
MONOCYTES # BLD AUTO: 0.38 K/UL
MONOCYTES NFR BLD AUTO: 7.7 %
MONOMERIC PROLACTIN (ICMA)*: 12 NG/ML
NEUTROPHILS # BLD AUTO: 2.84 K/UL
NEUTROPHILS NFR BLD AUTO: 57.5 %
PERCENT MACROPROLACTIN: 64 %
PLATELET # BLD AUTO: 220 K/UL
POTASSIUM SERPL-SCNC: 5 MMOL/L
PROLACTIN SERPL-MCNC: 21.6 NG/ML
PROLACTIN, SERUM (ICMA)*: 33 NG/ML
PROT SERPL-MCNC: 7.2 G/DL
PSA SERPL-MCNC: 0.67 NG/ML
PT BLD: 34.4 SEC
RBC # BLD: 4.86 M/UL
RBC # FLD: 14.1 %
SHBG SERPL-SCNC: 70 NMOL/L
SODIUM SERPL-SCNC: 142 MMOL/L
T4 FREE SERPL-MCNC: 1.4 NG/DL
TESTOST BND SERPL-MCNC: 10.7 NG/DL
TESTOST SERPL-MCNC: 739.3 NG/DL
TESTOSTERONE BIOAVAILABLE: 78 NG/DL
TESTOSTERONE TOTAL S: 711 NG/DL
THYROGLOB AB SERPL-ACNC: <20 IU/ML
THYROPEROXIDASE AB SERPL IA-ACNC: 15.9 IU/ML
TSH SERPL-ACNC: 6.75 UIU/ML
URATE SERPL-MCNC: 6.7 MG/DL
WBC # FLD AUTO: 4.94 K/UL

## 2018-12-10 ENCOUNTER — APPOINTMENT (OUTPATIENT)
Dept: INFECTIOUS DISEASE | Facility: CLINIC | Age: 68
End: 2018-12-10
Payer: COMMERCIAL

## 2018-12-10 VITALS
OXYGEN SATURATION: 96 % | TEMPERATURE: 97 F | SYSTOLIC BLOOD PRESSURE: 146 MMHG | BODY MASS INDEX: 25.16 KG/M2 | DIASTOLIC BLOOD PRESSURE: 75 MMHG | HEART RATE: 60 BPM | WEIGHT: 151 LBS | HEIGHT: 65 IN

## 2018-12-10 PROCEDURE — 99214 OFFICE O/P EST MOD 30 MIN: CPT

## 2018-12-10 RX ORDER — EMTRICITABINE AND TENOFOVIR ALAFENAMIDE 200; 25 MG/1; MG/1
200-25 TABLET ORAL
Qty: 90 | Refills: 1 | Status: DISCONTINUED | COMMUNITY
Start: 2017-05-15 | End: 2018-12-10

## 2018-12-11 ENCOUNTER — MEDICATION RENEWAL (OUTPATIENT)
Age: 68
End: 2018-12-11

## 2018-12-11 LAB
ALBUMIN SERPL ELPH-MCNC: 4.3 G/DL
ALP BLD-CCNC: 35 U/L
ALT SERPL-CCNC: 19 U/L
ANION GAP SERPL CALC-SCNC: 11 MMOL/L
AST SERPL-CCNC: 31 U/L
BILIRUB SERPL-MCNC: 0.4 MG/DL
BUN SERPL-MCNC: 32 MG/DL
CALCIUM SERPL-MCNC: 9.6 MG/DL
CD3 CELLS # BLD: 1105 /UL
CD3 CELLS NFR BLD: 66 %
CD3+CD4+ CELLS # BLD: 486 /UL
CD3+CD4+ CELLS NFR BLD: 29 %
CD3+CD4+ CELLS/CD3+CD8+ CLL SPEC: 0.84 RATIO
CD3+CD8+ CELLS # SPEC: 577 /UL
CD3+CD8+ CELLS NFR BLD: 34 %
CHLORIDE SERPL-SCNC: 105 MMOL/L
CO2 SERPL-SCNC: 23 MMOL/L
CREAT SERPL-MCNC: 1.64 MG/DL
CREAT SPEC-SCNC: 23 MG/DL
CREAT/PROT UR: 0.2 RATIO
CYSTATIN C SERPL-MCNC: 1.46 MG/L
GFR/BSA.PRED SERPLBLD CYS-BASED-ARV: 45 ML/MIN
GLUCOSE SERPL-MCNC: 67 MG/DL
HIV1 RNA # SERPL NAA+PROBE: NORMAL
HIV1 RNA # SERPL NAA+PROBE: NORMAL COPIES/ML
POTASSIUM SERPL-SCNC: 4.1 MMOL/L
PROT SERPL-MCNC: 7.5 G/DL
PROT UR-MCNC: 5 MG/DL
SODIUM SERPL-SCNC: 139 MMOL/L
VIRAL LOAD INTERP: NORMAL
VIRAL LOAD LOG: NORMAL LG COP/ML

## 2018-12-24 ENCOUNTER — APPOINTMENT (OUTPATIENT)
Dept: ELECTROPHYSIOLOGY | Facility: CLINIC | Age: 68
End: 2018-12-24
Payer: COMMERCIAL

## 2018-12-24 VITALS
WEIGHT: 141 LBS | OXYGEN SATURATION: 100 % | HEIGHT: 65 IN | DIASTOLIC BLOOD PRESSURE: 79 MMHG | BODY MASS INDEX: 23.49 KG/M2 | HEART RATE: 57 BPM | SYSTOLIC BLOOD PRESSURE: 131 MMHG

## 2018-12-24 DIAGNOSIS — Z86.79 PERSONAL HISTORY OF OTHER DISEASES OF THE CIRCULATORY SYSTEM: ICD-10-CM

## 2018-12-24 PROCEDURE — 93280 PM DEVICE PROGR EVAL DUAL: CPT

## 2019-01-18 ENCOUNTER — APPOINTMENT (OUTPATIENT)
Dept: INFECTIOUS DISEASE | Facility: CLINIC | Age: 69
End: 2019-01-18

## 2019-01-18 ENCOUNTER — APPOINTMENT (OUTPATIENT)
Dept: PHARMACY | Facility: CLINIC | Age: 69
End: 2019-01-18
Payer: SELF-PAY

## 2019-01-18 PROCEDURE — V5299A: CUSTOM | Mod: NC

## 2019-01-28 ENCOUNTER — INPATIENT (INPATIENT)
Facility: HOSPITAL | Age: 69
LOS: 0 days | Discharge: HOME | End: 2019-01-29
Attending: LEGAL MEDICINE | Admitting: LEGAL MEDICINE
Payer: COMMERCIAL

## 2019-01-28 VITALS
SYSTOLIC BLOOD PRESSURE: 139 MMHG | HEART RATE: 60 BPM | OXYGEN SATURATION: 99 % | DIASTOLIC BLOOD PRESSURE: 78 MMHG | TEMPERATURE: 99 F | RESPIRATION RATE: 16 BRPM

## 2019-01-28 DIAGNOSIS — B20 HUMAN IMMUNODEFICIENCY VIRUS [HIV] DISEASE: ICD-10-CM

## 2019-01-28 DIAGNOSIS — T17.900A UNSPECIFIED FOREIGN BODY IN RESPIRATORY TRACT, PART UNSPECIFIED CAUSING ASPHYXIATION, INITIAL ENCOUNTER: ICD-10-CM

## 2019-01-28 DIAGNOSIS — E78.5 HYPERLIPIDEMIA, UNSPECIFIED: ICD-10-CM

## 2019-01-28 DIAGNOSIS — I48.0 PAROXYSMAL ATRIAL FIBRILLATION: ICD-10-CM

## 2019-01-28 LAB
ALBUMIN SERPL ELPH-MCNC: 3.9 G/DL — SIGNIFICANT CHANGE UP (ref 3.3–5)
ALP SERPL-CCNC: 34 U/L — LOW (ref 40–120)
ALT FLD-CCNC: 12 U/L — SIGNIFICANT CHANGE UP (ref 4–41)
ANION GAP SERPL CALC-SCNC: 12 MMO/L — SIGNIFICANT CHANGE UP (ref 7–14)
APTT BLD: 51.4 SEC — HIGH (ref 27.5–36.3)
AST SERPL-CCNC: 28 U/L — SIGNIFICANT CHANGE UP (ref 4–40)
BASE EXCESS BLDV CALC-SCNC: 0.6 MMOL/L — SIGNIFICANT CHANGE UP
BASOPHILS # BLD AUTO: 0.05 K/UL — SIGNIFICANT CHANGE UP (ref 0–0.2)
BASOPHILS NFR BLD AUTO: 0.8 % — SIGNIFICANT CHANGE UP (ref 0–2)
BILIRUB SERPL-MCNC: 0.5 MG/DL — SIGNIFICANT CHANGE UP (ref 0.2–1.2)
BLOOD GAS VENOUS - CREATININE: 1.58 MG/DL — HIGH (ref 0.5–1.3)
BUN SERPL-MCNC: 38 MG/DL — HIGH (ref 7–23)
CALCIUM SERPL-MCNC: 9.4 MG/DL — SIGNIFICANT CHANGE UP (ref 8.4–10.5)
CHLORIDE BLDV-SCNC: 107 MMOL/L — SIGNIFICANT CHANGE UP (ref 96–108)
CHLORIDE SERPL-SCNC: 105 MMOL/L — SIGNIFICANT CHANGE UP (ref 98–107)
CO2 SERPL-SCNC: 23 MMOL/L — SIGNIFICANT CHANGE UP (ref 22–31)
CREAT SERPL-MCNC: 1.79 MG/DL — HIGH (ref 0.5–1.3)
EOSINOPHIL # BLD AUTO: 0.23 K/UL — SIGNIFICANT CHANGE UP (ref 0–0.5)
EOSINOPHIL NFR BLD AUTO: 3.5 % — SIGNIFICANT CHANGE UP (ref 0–6)
GAS PNL BLDV: 135 MMOL/L — LOW (ref 136–146)
GLUCOSE BLDV-MCNC: 83 — SIGNIFICANT CHANGE UP (ref 70–99)
GLUCOSE SERPL-MCNC: 82 MG/DL — SIGNIFICANT CHANGE UP (ref 70–99)
HCO3 BLDV-SCNC: 24 MMOL/L — SIGNIFICANT CHANGE UP (ref 20–27)
HCT VFR BLD CALC: 42.6 % — SIGNIFICANT CHANGE UP (ref 39–50)
HCT VFR BLDV CALC: 43.1 % — SIGNIFICANT CHANGE UP (ref 39–51)
HGB BLD-MCNC: 14 G/DL — SIGNIFICANT CHANGE UP (ref 13–17)
HGB BLDV-MCNC: 14.1 G/DL — SIGNIFICANT CHANGE UP (ref 13–17)
IMM GRANULOCYTES NFR BLD AUTO: 0.2 % — SIGNIFICANT CHANGE UP (ref 0–1.5)
INR BLD: 2.07 — HIGH (ref 0.88–1.17)
LACTATE BLDV-MCNC: 1.1 MMOL/L — SIGNIFICANT CHANGE UP (ref 0.5–2)
LYMPHOCYTES # BLD AUTO: 2.06 K/UL — SIGNIFICANT CHANGE UP (ref 1–3.3)
LYMPHOCYTES # BLD AUTO: 31.8 % — SIGNIFICANT CHANGE UP (ref 13–44)
MCHC RBC-ENTMCNC: 30.8 PG — SIGNIFICANT CHANGE UP (ref 27–34)
MCHC RBC-ENTMCNC: 32.9 % — SIGNIFICANT CHANGE UP (ref 32–36)
MCV RBC AUTO: 93.8 FL — SIGNIFICANT CHANGE UP (ref 80–100)
MONOCYTES # BLD AUTO: 0.46 K/UL — SIGNIFICANT CHANGE UP (ref 0–0.9)
MONOCYTES NFR BLD AUTO: 7.1 % — SIGNIFICANT CHANGE UP (ref 2–14)
NEUTROPHILS # BLD AUTO: 3.67 K/UL — SIGNIFICANT CHANGE UP (ref 1.8–7.4)
NEUTROPHILS NFR BLD AUTO: 56.6 % — SIGNIFICANT CHANGE UP (ref 43–77)
NRBC # FLD: 0 K/UL — LOW (ref 25–125)
PCO2 BLDV: 45 MMHG — SIGNIFICANT CHANGE UP (ref 41–51)
PH BLDV: 7.37 PH — SIGNIFICANT CHANGE UP (ref 7.32–7.43)
PLATELET # BLD AUTO: 233 K/UL — SIGNIFICANT CHANGE UP (ref 150–400)
PMV BLD: 10 FL — SIGNIFICANT CHANGE UP (ref 7–13)
PO2 BLDV: 38 MMHG — SIGNIFICANT CHANGE UP (ref 35–40)
POTASSIUM BLDV-SCNC: 3.8 MMOL/L — SIGNIFICANT CHANGE UP (ref 3.4–4.5)
POTASSIUM SERPL-MCNC: 4.1 MMOL/L — SIGNIFICANT CHANGE UP (ref 3.5–5.3)
POTASSIUM SERPL-SCNC: 4.1 MMOL/L — SIGNIFICANT CHANGE UP (ref 3.5–5.3)
PROT SERPL-MCNC: 7.4 G/DL — SIGNIFICANT CHANGE UP (ref 6–8.3)
PROTHROM AB SERPL-ACNC: 24.1 SEC — HIGH (ref 9.8–13.1)
RBC # BLD: 4.54 M/UL — SIGNIFICANT CHANGE UP (ref 4.2–5.8)
RBC # FLD: 13.1 % — SIGNIFICANT CHANGE UP (ref 10.3–14.5)
SAO2 % BLDV: 66.4 % — SIGNIFICANT CHANGE UP (ref 60–85)
SODIUM SERPL-SCNC: 140 MMOL/L — SIGNIFICANT CHANGE UP (ref 135–145)
WBC # BLD: 6.48 K/UL — SIGNIFICANT CHANGE UP (ref 3.8–10.5)
WBC # FLD AUTO: 6.48 K/UL — SIGNIFICANT CHANGE UP (ref 3.8–10.5)

## 2019-01-28 PROCEDURE — 99223 1ST HOSP IP/OBS HIGH 75: CPT

## 2019-01-28 RX ORDER — WARFARIN SODIUM 2.5 MG/1
0 TABLET ORAL
Qty: 0 | Refills: 0 | COMMUNITY

## 2019-01-28 NOTE — ED PROVIDER NOTE - PMH
Atherosclerosis    Atrial fibrillation  s/p DC CV in the past  Atrial fibrillation    Atrial flutter    Dyslipidemia    HIV (human immunodeficiency virus infection)    Paroxysmal atrial tachycardia    Renal insufficiency    Syncope

## 2019-01-28 NOTE — H&P ADULT - NSHPREVIEWOFSYSTEMS_GEN_ALL_CORE
Review of Systems:   CONSTITUTIONAL: No fever or chills  EYES: No eye pain, visual disturbances, or discharge  ENMT:  No difficulty hearing, no throat pain  NECK: No pain or stiffness  RESPIRATORY: + cough, No shortness of breath  CARDIOVASCULAR: No chest pain, palpitations, dizziness, or leg swelling  GASTROINTESTINAL: No abdominal pain, nausea or vomiting or diarrhea  GENITOURINARY: No dysuria, or hematuria  NEUROLOGICAL: No headaches, memory loss, loss of strength, numbness, or tremors  SKIN: No rashes, or lesions   LYMPH NODES: No enlarged glands  ENDOCRINE: No heat or cold intolerance  MUSCULOSKELETAL: No joint pain or swelling  PSYCHIATRIC: No depression or anxiety  HEME/LYMPH: No easy bruising, or bleeding  ALLERGY AND IMMUNOLOGIC: No hives or eczema

## 2019-01-28 NOTE — ED ADULT NURSE NOTE - OBJECTIVE STATEMENT
Pt received in intake spot 9, A&OX4, NAD.  c/o swallowed his false tooth approx 3 days ago and has been coughing since, productive of yellow sputum.  Pt states has CXR earlier this AM and told his tooth is in his R lung.  Pt endorsing slight SOB but denies any chest pain/palpitations.  Denies any other physical complaints.  Sent in by MD for bronchoscopy.  Respirations even and unlabored on room air, maintaining patent airway.  Appears comfortable.  Labs sent.  Will continue to monitor.

## 2019-01-28 NOTE — ED PROVIDER NOTE - NS ED ROS FT
Constitutional: no fevers, no chills.  Eyes: no visual changes.  Ears: no ear drainage, no ear pain.  Nose: no nasal congestion.  Mouth/Throat: no sore throat.  Cardiovascular: no chest pain.  Respiratory: no shortness of breath, no wheezing, +cough+aspirated tooth  Gastrointestinal: no nausea, no vomiting, no diarrhea, no abdominal pain.  MSK: no flank pain, no back pain.  Genitourinary: no dysuria, no hematuria.  Skin: no rashes.  Neuro: no headache,   Psychiatric: no known mental health issues.

## 2019-01-28 NOTE — H&P ADULT - HISTORY OF PRESENT ILLNESS
69 y/o M with HIV on HAART (VL undetectable, and CD4 486 on 12/11), AF on coumadin s/p 2 cardioversions, PPM, HTN, HLD, gout and prediabetes p/w cough.  Pt reports he felt like he swallowed a false tooth which had become dislodged while eating 3 days ago.  Since then, pt has had coughing with production of yellow sputum.  He has not had fevers, or dyspnea.  No prior history of dysphagia.  He had outpatient CXR performed which showed radiopaque body in his RLL and pt presented to ED for further mgmt after contacting pulmonology.

## 2019-01-28 NOTE — ED ADULT NURSE NOTE - NSIMPLEMENTINTERV_GEN_ALL_ED
Implemented All Fall with Harm Risk Interventions:  [place] to call system. Call bell, personal items and telephone within reach. Instruct patient to call for assistance. Room bathroom lighting operational. Non-slip footwear when patient is off stretcher. Physically safe environment: no spills, clutter or unnecessary equipment. Stretcher in lowest position, wheels locked, appropriate side rails in place. Provide visual cue, wrist band, yellow gown, etc. Monitor gait and stability. Monitor for mental status changes and reorient to person, place, and time. Review medications for side effects contributing to fall risk. Reinforce activity limits and safety measures with patient and family. Provide visual clues: red socks.

## 2019-01-28 NOTE — H&P ADULT - NSHPLABSRESULTS_GEN_ALL_CORE
01-28    140  |  105  |  38<H>  ----------------------------<  82  4.1   |  23  |  1.79<H>    Ca    9.4      28 Jan 2019 16:15    TPro  7.4  /  Alb  3.9  /  TBili  0.5  /  DBili  x   /  AST  28  /  ALT  12  /  AlkPhos  34<L>  01-28                            14.0   6.48  )-----------( 233      ( 28 Jan 2019 16:15 )             42.6             PT/INR - ( 28 Jan 2019 16:15 )   PT: 24.1 SEC;   INR: 2.07          PTT - ( 28 Jan 2019 16:15 )  PTT:51.4 SEC    EKG tracing reviewed: NSR  CXR film reviewed: FB in RLL

## 2019-01-28 NOTE — H&P ADULT - NSHPPHYSICALEXAM_GEN_ALL_CORE
Vital Signs Last 24 Hrs  T(C): 37.1 (28 Jan 2019 14:11), Max: 37.1 (28 Jan 2019 14:11)  T(F): 98.7 (28 Jan 2019 14:11), Max: 98.7 (28 Jan 2019 14:11)  HR: 60 (28 Jan 2019 14:11) (60 - 60)  BP: 139/78 (28 Jan 2019 14:11) (139/78 - 139/78)  BP(mean): --  RR: 16 (28 Jan 2019 14:11) (16 - 16)  SpO2: 99% (28 Jan 2019 14:11) (99% - 99%)    PHYSICAL EXAM:  GENERAL: No Acute Distress  EYES: conjunctiva and sclera clear  ENMT: Moist mucous membranes   NECK: Supple  CHEST/LUNG: Clear to auscultation bilaterally  HEART: Regular rate and rhythm; No murmurs, rubs, or gallops  ABDOMEN: Soft, Nontender, Nondistended; Bowel sounds normal  EXTREMITIES:   No clubbing, cyanosis, or pedal edema  PSYCH: Normal Affect, Normal Behavior  NEUROLOGY:   - Mental status A&O x 3,  SKIN: No rashes or lesions  MUSCULOSKELETAL: No joint swelling

## 2019-01-28 NOTE — ED PROVIDER NOTE - OBJECTIVE STATEMENT
67 y/o male with PMHx of Afib on coumadin, dyslipidemia, HIV (undetectable), now presents after swallowed his false tooth 3 days ago and had a chest xray earlier today showing it was in his RLL. 67 y/o male with PMHx of Afib on coumadin, dyslipidemia, HIV (undetectable), now presents after swallowed his false silver tooth 3 days ago while eating and had a chest xray earlier today showing it was in his RLL with some right lung scarring. Dr Issa Gutierrez was contacted and will bronch pt after admission. Pt is complains of coughs with clear phlegm. Denies hemoptysis, f/c, hematemesis, n/v, abdominal pain, cp, sob, hoarsness. Of note, stopped coumadin yesterday. 67 y/o male with PMHx of Afib on coumadin, dyslipidemia, HIV (undetectable), now presents after swallowed his false silver tooth 3 days ago while eating and had a chest xray earlier today showing 8mm object in his RLL with some right lung scarring. Dr Issa Gutierrez was contacted and will bronch pt after admission. Pt is complains of coughs with clear phlegm. Denies hemoptysis, f/c, hematemesis, n/v, abdominal pain, cp, sob, hoarsness. Of note, stopped coumadin yesterday.

## 2019-01-28 NOTE — ED PROVIDER NOTE - PROGRESS NOTE DETAILS
Dr Coyne: Pulm fellow paged to consult on pt. Dr Coyne: KEep patient NPO overnight. images uploaded to pacs.

## 2019-01-28 NOTE — ED ADULT TRIAGE NOTE - CHIEF COMPLAINT QUOTE
Pt states he swallowed his false tooth 3 days ago had a chest xray earlier today showing it was in his R lung. Pt denies chest pain, SOB, n/v/d, fever chills.

## 2019-01-28 NOTE — ED PROVIDER NOTE - MEDICAL DECISION MAKING DETAILS
67 y/o male with PMHx of Afib on coumadin, dyslipidemia, HIV (undetectable), now presents after swallowed his false silver tooth 3 days ago while eating and had a chest xray earlier today showing it was in his RLL with some right lung scarring. pre op labs, admit for bronch, ekg, coags, vbg, basic labs, consult pulm. no pain at this time

## 2019-01-28 NOTE — CHART NOTE - NSCHARTNOTEFT_GEN_A_CORE
Patient seen and examined at the bedside. Tooth aspiration 3 days prior to administration.     Recommendations:   - Plan for bronchoscopy tomorrow   - Please keep NPO after midnight.   - Hold coumadin tonight  - Check INR in am.   - Cough suppression.    Full consult to follow in am. Patient seen and examined at the bedside. Tooth aspiration 3 days prior to administration noted per history. Foreign body seen in right mainstem/BI in chest imaging.     Recommendations:   - Plan for bronchoscopy tomorrow for foreign body retrieval.   - Please keep NPO after midnight.   - Hold coumadin tonight  - Check INR in am.   - Cough suppression.    Full consult to follow in am. Patient seen and examined at the bedside. Tooth aspiration 3 days prior to administration noted per history. Foreign body seen in right mainstem/BI in chest imaging.     Recommendations:   - Plan for bronchoscopy tomorrow for foreign body retrieval.   - Please keep NPO after midnight.   - Hold coumadin tonight  - Check INR in am.   - Cough suppression.    Full consult to follow in am.    Juan Carlos Amaya MD  Fellow,  Pulmonary & Critical Care Medicine.  Pager - [phone] ([facility])/ [phone] ([place])

## 2019-01-28 NOTE — ED PROVIDER NOTE - PHYSICAL EXAMINATION
GEN: Well appearing, well nourished, in no apparent distress.  HEAD: NCAT  HEENT: PERRL, Airway patent, non-erythematous pharynx, no exudates, uvula midline, MMM, neck supple, no LAD, no JVD  LUNG: CTAB, no adventitious sounds, no retractions, no nasal flaring  CV: RRR, no murmurs,   Abd: soft, NTND, no rebound or guarding, BS+ in all quadrants, no CVAT  MSK: WWP, Pulses 2+ in extremities, No edema   Neuro:  AAOx3, Ambulatory with stable gait.  Skin: Warm and dry, no evidence of rash  Psych: normal mood and affect

## 2019-01-28 NOTE — ED PROVIDER NOTE - ATTENDING CONTRIBUTION TO CARE
Patient presents for cough, found to have aspirated his tooth 3 d ago. Patient was eating, thought he swallowed his silver tooth, developed cough, had cxr done as outpatient which identified radioopaque fb in rll of lung, referred to dr. phillips (pulm) who sent him to ed for poss bronch. Patient denies fevers, chills, cp, sob, vomiting, abd pain, diarrhea, dysuria.   exam  GEN - NAD; well appearing; A+O x3   HEAD - NC/AT   EYES- PERRL, EOMI  ENT: Airway patent, mmm, Oral cavity and pharynx normal. No inflammation, swelling, exudate, or lesions.  NECK: Neck supple, non-tender without lymphadenopathy, no masses.  PULMONARY - CTA b/l, symmetric breath sounds.   CARDIAC -s1s2, RRR, no M,G,R  ABDOMEN - +BS, ND, NT, soft, no guarding, no rebound, no masses   BACK - no CVA tenderness, Normal  spine   EXTREMITIES - FROM, symmetric pulses, capillary refill < 2 seconds, no edema   SKIN - no rash or bruising   NEUROLOGIC - alert, speech clear, no focal deficits  PSYCH -nl mood/affect, nl insight.  a/p-patient with cough s/p aspiration of silver tooth. vss, nontoxic appearing, will d/w pulm and reass. Patient presents for cough, found to have aspirated his tooth 3 d ago. Patient was eating, thought he swallowed his silver tooth, developed cough, had cxr done as outpatient which identified radioopaque fb in r. lung, referred to dr. phillips (pulm) who sent him to ed for poss bronch. Patient denies fevers, chills, cp, sob, vomiting, abd pain, diarrhea, dysuria.   exam  GEN - NAD; well appearing; A+O x3   HEAD - NC/AT   EYES- PERRL, EOMI  ENT: Airway patent, mmm, Oral cavity and pharynx normal. No inflammation, swelling, exudate, or lesions.  NECK: Neck supple, non-tender without lymphadenopathy, no masses.  PULMONARY - CTA b/l, symmetric breath sounds.   CARDIAC -s1s2, RRR, no M,G,R  ABDOMEN - +BS, ND, NT, soft, no guarding, no rebound, no masses   BACK - no CVA tenderness, Normal  spine   EXTREMITIES - FROM, symmetric pulses, capillary refill < 2 seconds, no edema   SKIN - no rash or bruising   NEUROLOGIC - alert, speech clear, no focal deficits  PSYCH -nl mood/affect, nl insight.  a/p-patient with cough s/p aspiration of silver tooth. vss, nontoxic appearing, will d/w pulm and reass.

## 2019-01-29 ENCOUNTER — APPOINTMENT (OUTPATIENT)
Dept: PULMONOLOGY | Facility: CLINIC | Age: 69
End: 2019-01-29

## 2019-01-29 VITALS — HEART RATE: 62 BPM | RESPIRATION RATE: 18 BRPM | DIASTOLIC BLOOD PRESSURE: 68 MMHG | SYSTOLIC BLOOD PRESSURE: 121 MMHG

## 2019-01-29 LAB
ANION GAP SERPL CALC-SCNC: 13 MMO/L — SIGNIFICANT CHANGE UP (ref 7–14)
BUN SERPL-MCNC: 43 MG/DL — HIGH (ref 7–23)
CALCIUM SERPL-MCNC: 9.5 MG/DL — SIGNIFICANT CHANGE UP (ref 8.4–10.5)
CHLORIDE SERPL-SCNC: 105 MMOL/L — SIGNIFICANT CHANGE UP (ref 98–107)
CO2 SERPL-SCNC: 23 MMOL/L — SIGNIFICANT CHANGE UP (ref 22–31)
CREAT SERPL-MCNC: 2.16 MG/DL — HIGH (ref 0.5–1.3)
GLUCOSE SERPL-MCNC: 110 MG/DL — HIGH (ref 70–99)
HCT VFR BLD CALC: 44.7 % — SIGNIFICANT CHANGE UP (ref 39–50)
HGB BLD-MCNC: 14.5 G/DL — SIGNIFICANT CHANGE UP (ref 13–17)
INR BLD: 1.68 — HIGH (ref 0.88–1.17)
MAGNESIUM SERPL-MCNC: 1.9 MG/DL — SIGNIFICANT CHANGE UP (ref 1.6–2.6)
MCHC RBC-ENTMCNC: 30.7 PG — SIGNIFICANT CHANGE UP (ref 27–34)
MCHC RBC-ENTMCNC: 32.4 % — SIGNIFICANT CHANGE UP (ref 32–36)
MCV RBC AUTO: 94.5 FL — SIGNIFICANT CHANGE UP (ref 80–100)
NRBC # FLD: 0 K/UL — LOW (ref 25–125)
PLATELET # BLD AUTO: 241 K/UL — SIGNIFICANT CHANGE UP (ref 150–400)
PMV BLD: 10.4 FL — SIGNIFICANT CHANGE UP (ref 7–13)
POTASSIUM SERPL-MCNC: 3.9 MMOL/L — SIGNIFICANT CHANGE UP (ref 3.5–5.3)
POTASSIUM SERPL-SCNC: 3.9 MMOL/L — SIGNIFICANT CHANGE UP (ref 3.5–5.3)
PROTHROM AB SERPL-ACNC: 19.5 SEC — HIGH (ref 9.8–13.1)
RBC # BLD: 4.73 M/UL — SIGNIFICANT CHANGE UP (ref 4.2–5.8)
RBC # FLD: 13.1 % — SIGNIFICANT CHANGE UP (ref 10.3–14.5)
SODIUM SERPL-SCNC: 141 MMOL/L — SIGNIFICANT CHANGE UP (ref 135–145)
WBC # BLD: 5.69 K/UL — SIGNIFICANT CHANGE UP (ref 3.8–10.5)
WBC # FLD AUTO: 5.69 K/UL — SIGNIFICANT CHANGE UP (ref 3.8–10.5)

## 2019-01-29 PROCEDURE — 99221 1ST HOSP IP/OBS SF/LOW 40: CPT | Mod: 25,GC

## 2019-01-29 PROCEDURE — 31635 BRONCHOSCOPY W/FB REMOVAL: CPT | Mod: GC

## 2019-01-29 RX ORDER — DARUNAVIR 75 MG/1
100 TABLET, FILM COATED ORAL DAILY
Qty: 0 | Refills: 0 | Status: DISCONTINUED | OUTPATIENT
Start: 2019-01-29 | End: 2019-01-29

## 2019-01-29 RX ORDER — FOLIC ACID 0.8 MG
1 TABLET ORAL DAILY
Qty: 0 | Refills: 0 | Status: DISCONTINUED | OUTPATIENT
Start: 2019-01-29 | End: 2019-01-29

## 2019-01-29 RX ORDER — DARUNAVIR 75 MG/1
800 TABLET, FILM COATED ORAL DAILY
Qty: 0 | Refills: 0 | Status: DISCONTINUED | OUTPATIENT
Start: 2019-01-29 | End: 2019-01-29

## 2019-01-29 RX ORDER — ATORVASTATIN CALCIUM 80 MG/1
20 TABLET, FILM COATED ORAL AT BEDTIME
Qty: 0 | Refills: 0 | Status: DISCONTINUED | OUTPATIENT
Start: 2019-01-29 | End: 2019-01-29

## 2019-01-29 RX ORDER — DOLUTEGRAVIR SODIUM 25 MG/1
50 TABLET, FILM COATED ORAL DAILY
Qty: 0 | Refills: 0 | Status: DISCONTINUED | OUTPATIENT
Start: 2019-01-29 | End: 2019-01-29

## 2019-01-29 RX ORDER — RITONAVIR 100 MG/1
100 TABLET, FILM COATED ORAL DAILY
Qty: 0 | Refills: 0 | Status: DISCONTINUED | OUTPATIENT
Start: 2019-01-29 | End: 2019-01-29

## 2019-01-29 RX ORDER — FENOFIBRATE,MICRONIZED 130 MG
145 CAPSULE ORAL DAILY
Qty: 0 | Refills: 0 | Status: DISCONTINUED | OUTPATIENT
Start: 2019-01-29 | End: 2019-01-29

## 2019-01-29 RX ORDER — METOPROLOL TARTRATE 50 MG
50 TABLET ORAL
Qty: 0 | Refills: 0 | Status: DISCONTINUED | OUTPATIENT
Start: 2019-01-29 | End: 2019-01-29

## 2019-01-29 NOTE — DISCHARGE NOTE ADULT - INSTRUCTIONS
for the next 12 hours do not drink alcohol beer, wine, or liquor. Do not drive or make any important decisions. Call MD for any bleeding that does not stop, any fevers >101.4 , persistent nausea/vomiting, or inability to tolerate food.   call  [facility]  [phone] or [phone] if unable to reach MD. progress slow

## 2019-01-29 NOTE — DISCHARGE NOTE ADULT - CARE PROVIDER_API CALL
Reji Baldwin), Infectious Disease  [address] [place] [address]  Phone: [phone]  Fax: [phone]

## 2019-01-29 NOTE — DISCHARGE NOTE ADULT - MEDICATION SUMMARY - MEDICATIONS TO TAKE
I will START or STAY ON the medications listed below when I get home from the hospital:    aspirin 81 mg oral tablet  -- 1 tab(s) by mouth once a day  -- Indication: For Prophylaxis     Coumadin 1 mg oral tablet  -- 1 tab(s) by mouth every other day  -- Indication: For Paroxysmal atrial fibrillation    Coumadin 2.5 mg oral tablet  -- 1 tab(s) by mouth every other day  -- Indication: For Paroxysmal atrial fibrillation    fenofibrate 145 mg oral tablet  -- 1 tab(s) by mouth once a day  -- Indication: For Hyperlipidemia, unspecified hyperlipidemia type    Lipitor 20 mg oral tablet  -- 1 tab(s) by mouth once a day  -- Indication: For Hyperlipidemia, unspecified hyperlipidemia type    Uloric 40 mg oral tablet  -- 1 tab(s) by mouth once a day  -- Indication: For HIV (human immunodeficiency virus infection)    Tivicay 50 mg oral tablet  -- 1 tab(s) by mouth once a day  -- Indication: For HIV (human immunodeficiency virus infection)    Prezista 800 mg oral tablet  -- 1 tab(s) by mouth once a day  -- Indication: For HIV (human immunodeficiency virus infection)    ritonavir 100 mg oral tablet  -- 1 tab(s) by mouth once a day  -- Indication: For HIV (human immunodeficiency virus infection)    metoprolol tartrate 50 mg oral tablet  -- 1 tab(s) by mouth 2 times a day  -- Indication: For Hypertension    folic acid 1 mg oral tablet  -- 1 tab(s) by mouth once a day  -- Indication: For Prophylaxis

## 2019-01-29 NOTE — DISCHARGE NOTE ADULT - HOSPITAL COURSE
67 y/o M with HIV on HAART (VL undetectable, and CD4 486 on 12/11), AF on coumadin s/p 2 cardioversions, PPM, HTN, HLD, gout and prediabetes p/w cough.  Pt reported he felt like he swallowed a false tooth which had become dislodged while eating 3 days ago.  Since then, pt has had coughing with production of yellow sputum.  He has not had fevers, or dyspnea.  No prior history of dysphagia.  He had outpatient CXR performed which showed radiopaque body in his RLL and pt presented to ED for further mgmt after contacting pulmonology.        ·   Aspiration of foreign body, initial encounter.    - plan is for bronchoscopy 1/29 -     ·   Paroxysmal atrial fibrillation.     - holding Coumadin  - continue metoprolol.       ·  HIV (human immunodeficiency virus infection).     - continue Tivicay, Prezista and Norvir.       ·   Hyperlipidemia, unspecified hyperlipidemia type.     - continue lipitor, and fenofibrate. 67 y/o M with HIV on HAART (VL undetectable, and CD4 486 on 12/11), AF on coumadin s/p 2 cardioversions, PPM, HTN, HLD, gout and prediabetes p/w cough.  Pt reported he felt like he swallowed a false tooth which had become dislodged while eating 3 days ago.  Since then, pt has had coughing with production of yellow sputum.  He has not had fevers, or dyspnea.  No prior history of dysphagia.  He had outpatient CXR performed which showed radiopaque body in his RLL and pt presented to ED for further mgmt after contacting pulmonology.        ·   Aspiration of foreign body, initial encounter.    - bronchoscopy done on 1/29 - foreign body removed    ·   Paroxysmal atrial fibrillation.     - holding Coumadin  - continue metoprolol.       ·  HIV (human immunodeficiency virus infection).     - continue Tivicay, Prezista and Norvir.       ·   Hyperlipidemia, unspecified hyperlipidemia type.     - continue lipitor, and fenofibrate.

## 2019-01-29 NOTE — DISCHARGE NOTE ADULT - CARE PLAN
Principal Discharge DX:	Aspiration of foreign body, initial encounter  Goal:	resolution  Assessment and plan of treatment:	Foreign body removed via bronchoscopy today in OR.

## 2019-01-29 NOTE — CONSULT NOTE ADULT - SUBJECTIVE AND OBJECTIVE BOX
HPI: 67 y/o M with HIV on HAART (VL undetectable, and CD4 486 on 12/11), AF on coumadin s/p 2 cardioversions, PPM, HTN, HLD, gout and prediabetes p/w cough.  Report swallowing a false tooth 4 days prior to admission but then started having cough with yellowish sputum..  He has not had fevers, or dyspnea.  He had outpatient CXR performed which showed radiopaque body in the right mainstem/BI and called our office and was referred to the ER.     Was seen and examined in the ER last night and at bedside this am. Mild cough, productive sputum. No other complains. Planned for bronchoscopy and foreign body removal today.     PAST MEDICAL & SURGICAL HISTORY:  Atrial fibrillation: s/p DC CV in the past  Dyslipidemia  Renal insufficiency  Atherosclerosis  HIV (human immunodeficiency virus infection)  Syncope  Paroxysmal atrial tachycardia  Atrial flutter  Atrial fibrillation  History of prior Afib ablation treatment      SOCIAL HISTORY:  Smoking: Non smoker  Occupation: Retired breast surgeon  Exposures: Denies  Recent Travel: [place] and [place]    Allergies    adhesives (Rash)  latex (Rash)  No Known Drug Allergies  Talc (Rash)    Intolerances        HOME MEDICATIONS:    REVIEW OF SYSTEMS:  Constitutional: No fevers or chills. No weight loss. No fatigue or generalised malaise.  Eyes: No itching or discharge from the eyes  ENT: No ear pain. No ear discharge. No nasal congestion. No post nasal drip. No epistaxis. No throat pain. No sore throat. No difficulty swallowing.   CV: No chest pain. No palpitations. No lightheadedness or dizziness.   Resp: No dyspnea at rest. No dyspnea on exertion. No orthopnea. No wheezing. + cough. No stridor. + sputum production. No chest pain with respiration.  GI: No nausea. No vomiting. No diarrhea.  MSK: No joint pain or pain in any extremities  Integumentary: No skin lesions. No pedal edema.  Neurological: No gross motor weakness. No sensory changes.      OBJECTIVE:  ICU Vital Signs Last 24 Hrs  T(C): 37 (29 Jan 2019 05:36), Max: 37.1 (28 Jan 2019 14:11)  T(F): 98.6 (29 Jan 2019 05:36), Max: 98.7 (28 Jan 2019 14:11)  HR: 81 (29 Jan 2019 05:36) (60 - 81)  BP: 133/72 (29 Jan 2019 05:36) (117/64 - 139/78)  RR: 18 (29 Jan 2019 05:36) (16 - 18)  SpO2: 96% (29 Jan 2019 05:36) (96% - 99%)        PHYSICAL EXAM:  General: Awake, alert, oriented X 3.   HEENT: Atraumatic, normocephalic.                 Mallampatti Grade 2                No nasal congestion.                No tonsillar or pharyngeal exudates.  Lymph Nodes: No palpable lymphadenopathy  Neck: No JVD. No carotid bruit.   Respiratory: Normal chest expansion                     Wheezing noted in RLL and some wheezing on the left too  Cardiovascular: S1 S2 normal.   Abdomen: Soft, non-tender, non-distended. No organomegaly.  Extremities: Warm to touch. Peripheral pulse palpable. No pedal edema.   Skin: No rashes or skin lesions  Neurological: Motor and sensory examination equal and normal in all four extremities.  Psychiatry: Appropriate mood and affect.    HOSPITAL MEDICATIONS:  MEDICATIONS  (STANDING):  atorvastatin 20 milliGRAM(s) Oral at bedtime  darunavir 800 milliGRAM(s) Oral daily  dolutegravir 50 milliGRAM(s) Oral daily  fenofibrate Tablet 145 milliGRAM(s) Oral daily  folic acid 1 milliGRAM(s) Oral daily  metoprolol tartrate 50 milliGRAM(s) Oral two times a day  ritonavir Tablet 100 milliGRAM(s) Oral daily    MEDICATIONS  (PRN):      LABS:                        14.5   5.69  )-----------( 241      ( 29 Jan 2019 06:00 )             44.7     01-28    140  |  105  |  38<H>  ----------------------------<  82  4.1   |  23  |  1.79<H>    Ca    9.4      28 Jan 2019 16:15    TPro  7.4  /  Alb  3.9  /  TBili  0.5  /  DBili  x   /  AST  28  /  ALT  12  /  AlkPhos  34<L>  01-28    PT/INR - ( 29 Jan 2019 06:00 )   PT: 19.5 SEC;   INR: 1.68          PTT - ( 28 Jan 2019 16:15 )  PTT:51.4 SEC      Venous Blood Gas:  01-28 @ 16:15  7.37/45/38/24/66.4  VBG Lactate: 1.1      RADIOLOGY:  [X] Reviewed and interpreted by me - Foreign body noted in right mainstem/BI    Point of Care Ultrasound Findings;

## 2019-01-29 NOTE — CONSULT NOTE ADULT - ASSESSMENT
67 y/o M with history of A fib on coumadin, HIV on HAART with undetectable viral load, presents with foreign body aspiration noted on xray.     Recommendations:  - NPO  - For bronchoscopy with foreign body retrieval today.  - Further plan based on results  - Resumption of coumadin only after the procedure.    Please call [phone] with questions    Juan Carlos Amaya MD  Fellow (PGY 6),  Pulmonary & Critical Care Medicine.  Pager - [phone] ([facility])/ [phone] ([place])

## 2019-01-29 NOTE — DISCHARGE NOTE ADULT - PATIENT PORTAL LINK FT
You can access the [facility] Portal, offered by [facility], by registering with the following website: [email or web address]

## 2019-01-29 NOTE — PROGRESS NOTE ADULT - SUBJECTIVE AND OBJECTIVE BOX
CHANDRAKANT MATA  68y  Male      Patient is a 68y old  Male who presents with a chief complaint of foreign body aspiration (29 Jan 2019 14:51)  H and P,and Pulmonary consult reviewed and appreciated.s/pBronchoscopyy for removal of false tooth,doing well.no sob,nocough,no cp,no fever     REVIEW OF SYSTEMS:  as above      INTERVAL HPI/OVERNIGHT EVENTS:  T(C): 36.6 (01-29-19 @ 15:30), Max: 37 (01-29-19 @ 05:36)  HR: 62 (01-29-19 @ 16:30) (61 - 81)  BP: 115/62 (01-29-19 @ 16:30) (97/57 - 148/69)  RR: 14 (01-29-19 @ 16:30) (12 - 18)  SpO2: 97% (01-29-19 @ 16:30) (95% - 97%)  Wt(kg): --  I&O's Summary    29 Jan 2019 07:01  -  29 Jan 2019 16:32  --------------------------------------------------------  IN: 480 mL / OUT: 0 mL / NET: 480 mL      T(C): 36.6 (01-29-19 @ 15:30), Max: 37 (01-29-19 @ 05:36)  HR: 62 (01-29-19 @ 16:30) (61 - 81)  BP: 115/62 (01-29-19 @ 16:30) (97/57 - 148/69)  RR: 14 (01-29-19 @ 16:30) (12 - 18)  SpO2: 97% (01-29-19 @ 16:30) (95% - 97%)  Wt(kg): --Vital Signs Last 24 Hrs  T(C): 36.6 (29 Jan 2019 15:30), Max: 37 (29 Jan 2019 05:36)  T(F): 97.9 (29 Jan 2019 15:30), Max: 98.6 (29 Jan 2019 05:36)  HR: 62 (29 Jan 2019 16:30) (61 - 81)  BP: 115/62 (29 Jan 2019 16:30) (97/57 - 148/69)  BP(mean): 75 (29 Jan 2019 16:30) (66 - 89)  RR: 14 (29 Jan 2019 16:30) (12 - 18)  SpO2: 97% (29 Jan 2019 16:30) (95% - 97%)    LABS:                        14.5   5.69  )-----------( 241      ( 29 Jan 2019 06:00 )             44.7     01-29    141  |  105  |  43<H>  ----------------------------<  110<H>  3.9   |  23  |  2.16<H>    Ca    9.5      29 Jan 2019 06:00  Mg     1.9     01-29    TPro  7.4  /  Alb  3.9  /  TBili  0.5  /  DBili  x   /  AST  28  /  ALT  12  /  AlkPhos  34<L>  01-28    PT/INR - ( 29 Jan 2019 06:00 )   PT: 19.5 SEC;   INR: 1.68          PTT - ( 28 Jan 2019 16:15 )  PTT:51.4 SEC    CAPILLARY BLOOD GLUCOSE                PAST MEDICAL & SURGICAL HISTORY:  Atrial fibrillation: s/p DC CV in the past  Dyslipidemia  Renal insufficiency  Atherosclerosis  HIV (human immunodeficiency virus infection)  Syncope  Paroxysmal atrial tachycardia  Atrial flutter  Atrial fibrillation  History of prior Afib ablation treatment      MEDICATIONS  (STANDING):  atorvastatin 20 milliGRAM(s) Oral at bedtime  darunavir 800 milliGRAM(s) Oral daily  dolutegravir 50 milliGRAM(s) Oral daily  fenofibrate Tablet 145 milliGRAM(s) Oral daily  folic acid 1 milliGRAM(s) Oral daily  metoprolol tartrate 50 milliGRAM(s) Oral two times a day  ritonavir Tablet 100 milliGRAM(s) Oral daily    MEDICATIONS  (PRN):        RADIOLOGY & ADDITIONAL TESTS:    Imaging Personally Reviewed:  [ ] YES  [ ] NO    Consultant(s) Notes Reviewed:  [ ] YES  [ ] NO    PHYSICAL EXAM:  GENERAL: NAD, well-groomed, well-developed  HEAD:  Atraumatic, Normocephalic  EYES: EOMI, PERRLA, conjunctiva and sclera clear  ENMT: No tonsillar erythema, exudates, or enlargement; Moist mucous membranes, Good dentition, No lesions  NECK: Supple, No JVD, Normal thyroid  NERVOUS SYSTEM:  Alert & Oriented X3, Good concentration; Motor Strength 5/5 B/L upper and lower extremities; DTRs 2+ intact and symmetric  CHEST/LUNG: Clear  bilaterally; No rales, rhonchi, wheezing, or rubs  HEART: Irregular with mvr  ABDOMEN: Soft, Nontender, Nondistended; Bowel sounds present  EXTREMITIES:  2+ Peripheral Pulses, No clubbing, cyanosis, or edema  LYMPH: No lymphadenopathy noted  SKIN: No rashes or lesions    Care Discussed with Consultants/Other Providers [ ] YES  [ ] NO      Code Status: [] Full Code [] DNR [] DNI [] Goals of Care:   Disposition: [] ICU [] Stroke Unit [] RCU []PCU []Floor [] Discharge Home         LANA AhnFACP

## 2019-01-31 ENCOUNTER — APPOINTMENT (OUTPATIENT)
Dept: INFECTIOUS DISEASE | Facility: CLINIC | Age: 69
End: 2019-01-31

## 2019-01-31 LAB
ALBUMIN SERPL ELPH-MCNC: 3.9 G/DL
ALP BLD-CCNC: 28 U/L
ALT SERPL-CCNC: 9 U/L
ANION GAP SERPL CALC-SCNC: 8 MMOL/L
AST SERPL-CCNC: 23 U/L
BILIRUB SERPL-MCNC: 0.4 MG/DL
BUN SERPL-MCNC: 33 MG/DL
CALCIUM SERPL-MCNC: 9.5 MG/DL
CHLORIDE SERPL-SCNC: 107 MMOL/L
CO2 SERPL-SCNC: 26 MMOL/L
CREAT SERPL-MCNC: 1.54 MG/DL
GLUCOSE SERPL-MCNC: 86 MG/DL
POTASSIUM SERPL-SCNC: 4.4 MMOL/L
PROT SERPL-MCNC: 7.2 G/DL
SODIUM SERPL-SCNC: 141 MMOL/L

## 2019-02-01 ENCOUNTER — APPOINTMENT (OUTPATIENT)
Dept: PHARMACY | Facility: CLINIC | Age: 69
End: 2019-02-01
Payer: SELF-PAY

## 2019-02-01 LAB
CYSTATIN C SERPL-MCNC: 1.38 MG/L
GFR/BSA.PRED SERPLBLD CYS-BASED-ARV: 49 ML/MIN
HIV1 RNA # SERPL NAA+PROBE: NORMAL
HIV1 RNA # SERPL NAA+PROBE: NORMAL COPIES/ML
VIRAL LOAD INTERP: NORMAL
VIRAL LOAD LOG: NORMAL LG COP/ML

## 2019-02-01 PROCEDURE — V5299A: CUSTOM | Mod: NC

## 2019-02-05 ENCOUNTER — RX RENEWAL (OUTPATIENT)
Age: 69
End: 2019-02-05

## 2019-02-19 ENCOUNTER — TRANSCRIPTION ENCOUNTER (OUTPATIENT)
Age: 69
End: 2019-02-19

## 2019-02-19 ENCOUNTER — MEDICATION RENEWAL (OUTPATIENT)
Age: 69
End: 2019-02-19

## 2019-02-19 ENCOUNTER — RX RENEWAL (OUTPATIENT)
Age: 69
End: 2019-02-19

## 2019-02-26 ENCOUNTER — TRANSCRIPTION ENCOUNTER (OUTPATIENT)
Age: 69
End: 2019-02-26

## 2019-02-26 ENCOUNTER — MEDICATION RENEWAL (OUTPATIENT)
Age: 69
End: 2019-02-26

## 2019-02-26 ENCOUNTER — RX RENEWAL (OUTPATIENT)
Age: 69
End: 2019-02-26

## 2019-03-02 ENCOUNTER — OTHER (OUTPATIENT)
Age: 69
End: 2019-03-02

## 2019-03-02 DIAGNOSIS — R79.9 ABNORMAL FINDING OF BLOOD CHEMISTRY, UNSPECIFIED: ICD-10-CM

## 2019-03-02 LAB
ESTRADIOL SERPL-MCNC: 30 PG/ML
FSH SERPL-MCNC: 24.2 IU/L
LH SERPL-ACNC: 22 IU/L
SHBG SERPL-SCNC: 72 NMOL/L
T4 FREE SERPL-MCNC: 1.4 NG/DL
TESTOST BND SERPL-MCNC: 7.66 NG/DL
TESTOST SERPL-MCNC: 712.2 NG/DL
TESTOSTERONE BIOAVAILABLE: 41 NG/DL
TESTOSTERONE TOTAL S: 589 NG/DL
TSH SERPL-ACNC: 3.47 UIU/ML

## 2019-03-03 ENCOUNTER — TRANSCRIPTION ENCOUNTER (OUTPATIENT)
Age: 69
End: 2019-03-03

## 2019-03-07 ENCOUNTER — APPOINTMENT (OUTPATIENT)
Dept: NEPHROLOGY | Facility: CLINIC | Age: 69
End: 2019-03-07
Payer: COMMERCIAL

## 2019-03-07 ENCOUNTER — FORM ENCOUNTER (OUTPATIENT)
Age: 69
End: 2019-03-07

## 2019-03-07 VITALS
OXYGEN SATURATION: 98 % | DIASTOLIC BLOOD PRESSURE: 65 MMHG | SYSTOLIC BLOOD PRESSURE: 114 MMHG | HEART RATE: 63 BPM | HEIGHT: 65 IN | WEIGHT: 153 LBS | BODY MASS INDEX: 25.49 KG/M2

## 2019-03-07 PROCEDURE — 99214 OFFICE O/P EST MOD 30 MIN: CPT

## 2019-03-07 NOTE — HISTORY OF PRESENT ILLNESS
[FreeTextEntry1] : Mr Watkins is a 70 y/o male with H/O  HIV ( > 20 years), CKD (10 years, with baseline creatinine of 1.6- 2 mg/dl ) comes in for a follow up visit.  \par \par Feels OK. Recently hospitalized at  [facility] end of January   for tooth aspiration/cough and underwent  bronchoscopy  for extraction. Serum creatinine on admission was 1.7 mg/dl and 2.1 on discharge\par Needs mri of the pituitary for evaluation of elevated prolactin/FSH however not possible as he has a pacemaker. Scheduled for  a testicular sonogram.  Now on tivicay instead of  descovy, which was  changed in dec 2018.  \par \par  \par  \par

## 2019-03-07 NOTE — PHYSICAL EXAM
[General Appearance - Alert] : alert [General Appearance - In No Acute Distress] : in no acute distress [General Appearance - Well Nourished] : well nourished [Sclera] : the sclera and conjunctiva were normal [PERRL With Normal Accommodation] : pupils were equal in size, round, and reactive to light [Oropharynx] : the oropharynx was normal [Neck Appearance] : the appearance of the neck was normal [Neck Cervical Mass (___cm)] : no neck mass was observed [] : no respiratory distress [Respiration, Rhythm And Depth] : normal respiratory rhythm and effort [Exaggerated Use Of Accessory Muscles For Inspiration] : no accessory muscle use [Auscultation Breath Sounds / Voice Sounds] : lungs were clear to auscultation bilaterally [Heart Sounds] : normal S1 and S2 [Heart Sounds Gallop] : no gallops [Murmurs] : no murmurs [Heart Sounds Pericardial Friction Rub] : no pericardial rub [Edema] : there was no peripheral edema

## 2019-03-07 NOTE — ASSESSMENT
[FreeTextEntry1] : 70 y/o male with HIV, CKD( Baseline creatinine 1.5-2 mg/dl)  here for evaluation:\par \par CKD:( Baseline creatinine 1.5-2 mg/dl) \par Recently his serum creatinine had been ranging in the 1.5-1.7 mg/dl range, although it had been in the 2  range in 2013/2014. Most recent serum creatinine is up at 1.9 mg/dl which is slightly higher than what he has been. Of note, it was up at 2.1 mg/dl on the day of discharge in January. \par He is now on Doultegavir which can inhibit the tubular creatinine secretion by blocking the organic cation transporter located at the basolateral side of proximal tubular cell. This is not a true drop in the GFR. \par Unlikely  for this to be responsible for the bump in serum creatinine as his blood work was not significantly altered when checked one month post medication change\par For now will make no changes\par Repeat CKD blood  work as well as Cystatin GFR in 2-3 weeks\par Also repeat U/A, ur P/C at that time \par BP perfect and euvolemic on examt\par Repeat Renal sonogram done in 5/18: simple left renal cyst\par Maintain on Vit D supplements \par  \par HIV: management per ID\par F/U in 6 month\par \par (c) [phone]

## 2019-03-07 NOTE — REVIEW OF SYSTEMS
[Negative] : Genitourinary [Fever] : no fever [Chills] : no chills [Eyesight Problems] : no eyesight problems [Nosebleeds] : no nosebleeds [Sore Throat] : no sore throat [Chest Pain] : no chest pain [Shortness Of Breath] : no shortness of breath [Cough] : no cough [Abdominal Pain] : no abdominal pain [Vomiting] : no vomiting [Constipation] : no constipation [Diarrhea] : no diarrhea [Dysuria] : no dysuria [Hesitancy] : no urinary hesitancy [Nocturia] : no nocturia [Joint Swelling] : no joint swelling [Joint Stiffness] : no joint stiffness [Itching] : no itching [Change In A Mole] : no change in a mole [Dizziness] : no dizziness [Fainting] : no fainting [Anxiety] : no anxiety [Depression] : no depression [Easy Bleeding] : no tendency for easy bleeding [Easy Bruising] : no tendency for easy bruising

## 2019-03-08 ENCOUNTER — OUTPATIENT (OUTPATIENT)
Dept: OUTPATIENT SERVICES | Facility: HOSPITAL | Age: 69
LOS: 1 days | End: 2019-03-08
Payer: COMMERCIAL

## 2019-03-08 ENCOUNTER — APPOINTMENT (OUTPATIENT)
Dept: ULTRASOUND IMAGING | Facility: CLINIC | Age: 69
End: 2019-03-08
Payer: COMMERCIAL

## 2019-03-08 DIAGNOSIS — Z00.8 ENCOUNTER FOR OTHER GENERAL EXAMINATION: ICD-10-CM

## 2019-03-08 PROCEDURE — 76870 US EXAM SCROTUM: CPT | Mod: 26

## 2019-03-08 PROCEDURE — 76870 US EXAM SCROTUM: CPT

## 2019-03-11 ENCOUNTER — APPOINTMENT (OUTPATIENT)
Dept: INFECTIOUS DISEASE | Facility: CLINIC | Age: 69
End: 2019-03-11
Payer: COMMERCIAL

## 2019-03-11 ENCOUNTER — APPOINTMENT (OUTPATIENT)
Dept: ENDOCRINOLOGY | Facility: CLINIC | Age: 69
End: 2019-03-11
Payer: COMMERCIAL

## 2019-03-11 VITALS
OXYGEN SATURATION: 98 % | HEIGHT: 65 IN | DIASTOLIC BLOOD PRESSURE: 70 MMHG | WEIGHT: 153 LBS | SYSTOLIC BLOOD PRESSURE: 118 MMHG | BODY MASS INDEX: 25.49 KG/M2 | HEART RATE: 69 BPM

## 2019-03-11 VITALS
WEIGHT: 151 LBS | HEART RATE: 68 BPM | SYSTOLIC BLOOD PRESSURE: 136 MMHG | OXYGEN SATURATION: 98 % | DIASTOLIC BLOOD PRESSURE: 71 MMHG | HEIGHT: 65 IN | TEMPERATURE: 97.6 F | BODY MASS INDEX: 25.16 KG/M2

## 2019-03-11 LAB
ACTH SER-ACNC: 32.5 PG/ML
ALBUMIN SERPL ELPH-MCNC: 4.3 G/DL
ALP BLD-CCNC: 36 U/L
ALT SERPL-CCNC: 16 U/L
ANION GAP SERPL CALC-SCNC: 16 MMOL/L
AST SERPL-CCNC: 24 U/L
BILIRUB SERPL-MCNC: 0.3 MG/DL
BUN SERPL-MCNC: 37 MG/DL
CALCIUM SERPL-MCNC: 9.6 MG/DL
CHLORIDE SERPL-SCNC: 106 MMOL/L
CO2 SERPL-SCNC: 21 MMOL/L
CORTIS SERPL-MCNC: 8.6 UG/DL
CREAT SERPL-MCNC: 1.9 MG/DL
ESTRADIOL SERPL-MCNC: 41 PG/ML
FSH SERPL-MCNC: 29.5 IU/L
GLUCOSE SERPL-MCNC: 92 MG/DL
HBA1C MFR BLD HPLC: 5.8 %
IGF-1 INTERP: NORMAL
IGF-I BLD-MCNC: 137 NG/ML
LH SERPL-ACNC: 21.5 IU/L
MONOMERIC PROLACTIN (ICMA)*: 8.9 NG/ML
PERCENT MACROPROLACTIN: 68 %
POTASSIUM SERPL-SCNC: 4.6 MMOL/L
PROLACTIN SERPL-MCNC: 20.1 NG/ML
PROLACTIN, SERUM (ICMA)*: 28 NG/ML
PROT SERPL-MCNC: 7 G/DL
SHBG SERPL-SCNC: 75 NMOL/L
SODIUM SERPL-SCNC: 143 MMOL/L
T4 FREE SERPL-MCNC: 1.3 NG/DL
TESTOST BND SERPL-MCNC: 11.6 NG/DL
TESTOST SERPL-MCNC: 715 NG/DL
TESTOSTERONE BIOAVAILABLE: 71 NG/DL
TESTOSTERONE TOTAL S: 774 NG/DL
TSH SERPL-ACNC: 7.81 UIU/ML

## 2019-03-11 PROCEDURE — 99214 OFFICE O/P EST MOD 30 MIN: CPT

## 2019-03-11 NOTE — ASSESSMENT
[FreeTextEntry1] : HIV suppressed on Prezista/Norvir and Tivicay\par Had increase in creatinine to 1.9, will repeat today (ordered through endocrinology)\par Check VL, CD4 and renal function by cystatin C\par Lipids- will return for fasted measure\par PSA and STI\par If renal function stabilizes/improves has M184V, new data would support Biktarvy as STR, but concern about TAF effect on marginal GFR\par \par \par

## 2019-03-11 NOTE — HISTORY OF PRESENT ILLNESS
[Sexually Active] : The patient is sexually active [Monogamous] : monogamous [Condom Use - All Encounters] : for every encounter [Men] : with men [FreeTextEntry1] : HIV suppressed on Norvir/Prezista and Tivicay\par Tolerating well.\par \par Doing well\par Had a great trip to [place] and [place]\par \par

## 2019-03-13 ENCOUNTER — FORM ENCOUNTER (OUTPATIENT)
Age: 69
End: 2019-03-13

## 2019-03-14 ENCOUNTER — OTHER (OUTPATIENT)
Age: 69
End: 2019-03-14

## 2019-03-14 ENCOUNTER — APPOINTMENT (OUTPATIENT)
Dept: CT IMAGING | Facility: CLINIC | Age: 69
End: 2019-03-14
Payer: COMMERCIAL

## 2019-03-14 ENCOUNTER — APPOINTMENT (OUTPATIENT)
Dept: ULTRASOUND IMAGING | Facility: CLINIC | Age: 69
End: 2019-03-14
Payer: COMMERCIAL

## 2019-03-14 ENCOUNTER — OUTPATIENT (OUTPATIENT)
Dept: OUTPATIENT SERVICES | Facility: HOSPITAL | Age: 69
LOS: 1 days | End: 2019-03-14
Payer: COMMERCIAL

## 2019-03-14 DIAGNOSIS — R79.89 OTHER SPECIFIED ABNORMAL FINDINGS OF BLOOD CHEMISTRY: ICD-10-CM

## 2019-03-14 DIAGNOSIS — Z00.8 ENCOUNTER FOR OTHER GENERAL EXAMINATION: ICD-10-CM

## 2019-03-14 PROCEDURE — 70450 CT HEAD/BRAIN W/O DYE: CPT | Mod: 26

## 2019-03-14 PROCEDURE — 76536 US EXAM OF HEAD AND NECK: CPT

## 2019-03-14 PROCEDURE — 76536 US EXAM OF HEAD AND NECK: CPT | Mod: 26

## 2019-03-14 PROCEDURE — 70450 CT HEAD/BRAIN W/O DYE: CPT

## 2019-03-20 LAB — CORTICOSTEROID BIND GLOBULIN: 2.5 MG/DL

## 2019-03-22 ENCOUNTER — RESULT REVIEW (OUTPATIENT)
Age: 69
End: 2019-03-22

## 2019-03-27 ENCOUNTER — OTHER (OUTPATIENT)
Age: 69
End: 2019-03-27

## 2019-03-28 LAB
ALBUMIN SERPL ELPH-MCNC: 4.5 G/DL
ANION GAP SERPL CALC-SCNC: 14 MMOL/L
BASOPHILS # BLD AUTO: 0.04 K/UL
BASOPHILS # BLD AUTO: 0.04 K/UL
BASOPHILS NFR BLD AUTO: 0.9 %
BASOPHILS NFR BLD AUTO: 0.9 %
BUN SERPL-MCNC: 33 MG/DL
CALCIUM SERPL-MCNC: 10 MG/DL
CALCIUM SERPL-MCNC: 10 MG/DL
CHLORIDE SERPL-SCNC: 106 MMOL/L
CO2 SERPL-SCNC: 22 MMOL/L
CREAT SERPL-MCNC: 1.86 MG/DL
CYSTATIN C SERPL-MCNC: 1.65 MG/L
EOSINOPHIL # BLD AUTO: 0.14 K/UL
EOSINOPHIL # BLD AUTO: 0.14 K/UL
EOSINOPHIL NFR BLD AUTO: 3.1 %
EOSINOPHIL NFR BLD AUTO: 3.1 %
GFR/BSA.PRED SERPLBLD CYS-BASED-ARV: 39 ML/MIN
GLUCOSE SERPL-MCNC: 98 MG/DL
HCT VFR BLD CALC: 45.5 %
HCT VFR BLD CALC: 46.7 %
HCV AB SER QL: NONREACTIVE
HCV S/CO RATIO: 0.17 S/CO
HGB BLD-MCNC: 14.5 G/DL
HGB BLD-MCNC: 14.6 G/DL
IMM GRANULOCYTES NFR BLD AUTO: 0.2 %
IMM GRANULOCYTES NFR BLD AUTO: 0.2 %
LYMPHOCYTES # BLD AUTO: 1.4 K/UL
LYMPHOCYTES # BLD AUTO: 1.51 K/UL
LYMPHOCYTES NFR BLD AUTO: 31.4 %
LYMPHOCYTES NFR BLD AUTO: 33.1 %
MAN DIFF?: NORMAL
MAN DIFF?: NORMAL
MCHC RBC-ENTMCNC: 29.6 PG
MCHC RBC-ENTMCNC: 30.2 PG
MCHC RBC-ENTMCNC: 31 GM/DL
MCHC RBC-ENTMCNC: 32.1 GM/DL
MCV RBC AUTO: 94 FL
MCV RBC AUTO: 95.3 FL
MONOCYTES # BLD AUTO: 0.31 K/UL
MONOCYTES # BLD AUTO: 0.33 K/UL
MONOCYTES NFR BLD AUTO: 7 %
MONOCYTES NFR BLD AUTO: 7.2 %
NEUTROPHILS # BLD AUTO: 2.53 K/UL
NEUTROPHILS # BLD AUTO: 2.56 K/UL
NEUTROPHILS NFR BLD AUTO: 55.5 %
NEUTROPHILS NFR BLD AUTO: 57.4 %
PARATHYROID HORMONE INTACT: 11 PG/ML
PHOSPHATE SERPL-MCNC: 3.6 MG/DL
PLATELET # BLD AUTO: 203 K/UL
PLATELET # BLD AUTO: 204 K/UL
POTASSIUM SERPL-SCNC: 4.3 MMOL/L
RBC # BLD: 4.84 M/UL
RBC # BLD: 4.9 M/UL
RBC # FLD: 13.1 %
RBC # FLD: 13.1 %
SODIUM SERPL-SCNC: 142 MMOL/L
WBC # FLD AUTO: 4.46 K/UL
WBC # FLD AUTO: 4.56 K/UL

## 2019-03-30 LAB
25(OH)D3 SERPL-MCNC: 28.6 NG/ML
APPEARANCE: CLEAR
BACTERIA: NEGATIVE
BILIRUBIN URINE: NEGATIVE
BLOOD URINE: NEGATIVE
COLOR: YELLOW
CREAT SPEC-SCNC: 117 MG/DL
CREAT/PROT UR: 0.2 RATIO
GLUCOSE QUALITATIVE U: NEGATIVE
HYALINE CASTS: 1 /LPF
KETONES URINE: NEGATIVE
LEUKOCYTE ESTERASE URINE: NEGATIVE
MICROSCOPIC-UA: NORMAL
NITRITE URINE: NEGATIVE
PH URINE: 6
PROT UR-MCNC: 20 MG/DL
PROTEIN URINE: NORMAL
RED BLOOD CELLS URINE: 1 /HPF
SPECIFIC GRAVITY URINE: 1.02
SQUAMOUS EPITHELIAL CELLS: 0 /HPF
UROBILINOGEN URINE: NORMAL
WHITE BLOOD CELLS URINE: 1 /HPF

## 2019-04-01 LAB
APPEARANCE: CLEAR
BACTERIA: NEGATIVE
BILIRUBIN URINE: NEGATIVE
BLOOD URINE: NEGATIVE
CD3 CELLS # BLD: 905 /UL
CD3 CELLS NFR BLD: 70 %
CD3+CD4+ CELLS # BLD: 395 /UL
CD3+CD4+ CELLS NFR BLD: 30 %
CD3+CD4+ CELLS/CD3+CD8+ CLL SPEC: 0.8 RATIO
CD3+CD8+ CELLS # SPEC: 491 /UL
CD3+CD8+ CELLS NFR BLD: 38 %
CHOLEST SERPL-MCNC: 208 MG/DL
CHOLEST/HDLC SERPL: 4.1 RATIO
COLOR: YELLOW
CREAT SPEC-SCNC: 117 MG/DL
CREAT/PROT UR: 0.2 RATIO
CYSTATIN C SERPL-MCNC: 1.63 MG/L
GFR/BSA.PRED SERPLBLD CYS-BASED-ARV: 39 ML/MIN
GLUCOSE QUALITATIVE U: NEGATIVE
HDLC SERPL-MCNC: 51 MG/DL
HIV1 RNA # SERPL NAA+PROBE: NORMAL
HIV1 RNA # SERPL NAA+PROBE: NORMAL COPIES/ML
HYALINE CASTS: 1 /LPF
KETONES URINE: NEGATIVE
LDLC SERPL CALC-MCNC: 124 MG/DL
LEUKOCYTE ESTERASE URINE: NEGATIVE
M TB IFN-G BLD-IMP: NEGATIVE
MICROSCOPIC-UA: NORMAL
NITRITE URINE: NEGATIVE
PH URINE: 6
PROT UR-MCNC: 19 MG/DL
PROTEIN URINE: NORMAL
PSA SERPL-MCNC: 0.73 NG/ML
QUANTIFERON TB PLUS MITOGEN MINUS NIL: >10 IU/ML
QUANTIFERON TB PLUS NIL: 0.27 IU/ML
QUANTIFERON TB PLUS TB1 MINUS NIL: 0.05 IU/ML
QUANTIFERON TB PLUS TB2 MINUS NIL: 0 IU/ML
RED BLOOD CELLS URINE: 1 /HPF
SPECIFIC GRAVITY URINE: 1.02
SQUAMOUS EPITHELIAL CELLS: 0 /HPF
T PALLIDUM AB SER QL IA: NEGATIVE
TRIGL SERPL-MCNC: 164 MG/DL
UROBILINOGEN URINE: NORMAL
VIRAL LOAD INTERP: NORMAL
VIRAL LOAD LOG: NORMAL LG COP/ML
WHITE BLOOD CELLS URINE: 1 /HPF

## 2019-04-04 ENCOUNTER — RESULT REVIEW (OUTPATIENT)
Age: 69
End: 2019-04-04

## 2019-04-04 LAB
24R-OH-CALCIDIOL SERPL-MCNC: 38.7 PG/ML
25(OH)D3 SERPL-MCNC: 32.6 NG/ML
ALBUMIN SERPL ELPH-MCNC: 4.6 G/DL
ALP BLD-CCNC: 36 U/L
ALT SERPL-CCNC: 23 U/L
ANION GAP SERPL CALC-SCNC: 12 MMOL/L
AST SERPL-CCNC: 28 U/L
BILIRUB SERPL-MCNC: 0.5 MG/DL
BUN SERPL-MCNC: 33 MG/DL
CALCIUM SERPL-MCNC: 10.2 MG/DL
CALCIUM SERPL-MCNC: 10.2 MG/DL
CHLORIDE SERPL-SCNC: 105 MMOL/L
CO2 SERPL-SCNC: 23 MMOL/L
CREAT SERPL-MCNC: 1.79 MG/DL
GLUCOSE SERPL-MCNC: 96 MG/DL
MAGNESIUM SERPL-MCNC: 1.8 MG/DL
PARATHYROID HORMONE INTACT: 10 PG/ML
PHOSPHATE SERPL-MCNC: 3.4 MG/DL
POTASSIUM SERPL-SCNC: 4.2 MMOL/L
PROT SERPL-MCNC: 7.6 G/DL
SODIUM SERPL-SCNC: 140 MMOL/L
SPERM AB SMN QL: NORMAL
T4 FREE SERPL-MCNC: 1.2 NG/DL
THYROGLOB AB SERPL-ACNC: <20 IU/ML
THYROPEROXIDASE AB SERPL IA-ACNC: 20.8 IU/ML
TSH SERPL-ACNC: 7.1 UIU/ML

## 2019-04-29 ENCOUNTER — TRANSCRIPTION ENCOUNTER (OUTPATIENT)
Age: 69
End: 2019-04-29

## 2019-05-08 LAB
25(OH)D3 SERPL-MCNC: 30 NG/ML
ALBUMIN SERPL ELPH-MCNC: 4.3 G/DL
ANION GAP SERPL CALC-SCNC: 14 MMOL/L
APPEARANCE: CLEAR
BACTERIA: NEGATIVE
BASOPHILS # BLD AUTO: 0.06 K/UL
BASOPHILS NFR BLD AUTO: 1.1 %
BILIRUBIN URINE: NEGATIVE
BLOOD URINE: NEGATIVE
BUN SERPL-MCNC: 52 MG/DL
CALCIUM SERPL-MCNC: 9.6 MG/DL
CALCIUM SERPL-MCNC: 9.6 MG/DL
CHLORIDE SERPL-SCNC: 105 MMOL/L
CO2 SERPL-SCNC: 23 MMOL/L
COLOR: NORMAL
CREAT SERPL-MCNC: 1.85 MG/DL
CREAT SPEC-SCNC: 78 MG/DL
CREAT/PROT UR: 0.1 RATIO
CYSTATIN C SERPL-MCNC: 1.83 MG/L
EOSINOPHIL # BLD AUTO: 0.23 K/UL
EOSINOPHIL NFR BLD AUTO: 4.2 %
ESTIMATED AVERAGE GLUCOSE: 117 MG/DL
FERRITIN SERPL-MCNC: 171 NG/ML
GFR/BSA.PRED SERPLBLD CYS-BASED-ARV: 34 ML/MIN
GLUCOSE QUALITATIVE U: NEGATIVE
GLUCOSE SERPL-MCNC: 102 MG/DL
HBA1C MFR BLD HPLC: 5.7 %
HCT VFR BLD CALC: 50.5 %
HGB BLD-MCNC: 15.8 G/DL
HYALINE CASTS: 0 /LPF
IMM GRANULOCYTES NFR BLD AUTO: 0.2 %
IRON SATN MFR SERPL: 28 %
IRON SERPL-MCNC: 94 UG/DL
KETONES URINE: NEGATIVE
LEUKOCYTE ESTERASE URINE: NEGATIVE
LYMPHOCYTES # BLD AUTO: 1.69 K/UL
LYMPHOCYTES NFR BLD AUTO: 30.6 %
MAGNESIUM SERPL-MCNC: 2 MG/DL
MAN DIFF?: NORMAL
MCHC RBC-ENTMCNC: 29.5 PG
MCHC RBC-ENTMCNC: 31.3 GM/DL
MCV RBC AUTO: 94.4 FL
MICROSCOPIC-UA: NORMAL
MONOCYTES # BLD AUTO: 0.47 K/UL
MONOCYTES NFR BLD AUTO: 8.5 %
NEUTROPHILS # BLD AUTO: 3.06 K/UL
NEUTROPHILS NFR BLD AUTO: 55.4 %
NITRITE URINE: NEGATIVE
PARATHYROID HORMONE INTACT: 21 PG/ML
PH URINE: 6
PHOSPHATE SERPL-MCNC: 4 MG/DL
PLATELET # BLD AUTO: 216 K/UL
POTASSIUM SERPL-SCNC: 4.5 MMOL/L
PROT UR-MCNC: 8 MG/DL
PROTEIN URINE: NEGATIVE
RBC # BLD: 5.35 M/UL
RBC # FLD: 13.1 %
RED BLOOD CELLS URINE: 0 /HPF
SODIUM SERPL-SCNC: 142 MMOL/L
SPECIFIC GRAVITY URINE: 1.02
SQUAMOUS EPITHELIAL CELLS: 0 /HPF
TIBC SERPL-MCNC: 341 UG/DL
UIBC SERPL-MCNC: 247 UG/DL
URATE SERPL-MCNC: 6.2 MG/DL
UROBILINOGEN URINE: NORMAL
WBC # FLD AUTO: 5.52 K/UL
WHITE BLOOD CELLS URINE: 0 /HPF

## 2019-05-10 ENCOUNTER — MEDICATION RENEWAL (OUTPATIENT)
Age: 69
End: 2019-05-10

## 2019-05-17 ENCOUNTER — RESULT REVIEW (OUTPATIENT)
Age: 69
End: 2019-05-17

## 2019-05-17 LAB
T4 FREE SERPL-MCNC: 1.3 NG/DL
TSH SERPL-ACNC: 5.76 UIU/ML

## 2019-05-27 NOTE — HISTORY OF PRESENT ILLNESS
[FreeTextEntry1] : 68 yo M with PMH HIV, PAF post ablation X 2, post PPM, h/o of cholangitis, Fhx of FAP, HTN, HLD, drug induced pancreatitis, CKD, CAD, prediabetes, elevated gonadotropins, subclinical hypothyroidism, macroprolactinemia. \par \par Originally presented for management of prediabetes\par He was diagnosed with prediabetes 8/28/15\par He was then found to have elevated gonadotropins with FSH to 25.5 and LH to 21.5 with normal testosterone values.\par 3/8/19 scrotal US showed small testicular size and 7 mm right epididymal cyst.\par Also found to have subclinical hypothyroidism - confirmed, with TSH of 6.75 which normalized and upon repeat was elevated to 7.81\par previously on amiodarone for 3 months (5 years ago)\par Also diagnosed with macroprolactinemia \par \par

## 2019-05-27 NOTE — ASSESSMENT
[FreeTextEntry1] : 70 yo M with PMH HIV, PAF post ablation X 2, post PPM, h/o of cholangitis, Fhx of FAP, HTN, HLD, drug induced pancreatitis, CKD, CAD, prediabetes, elevated gonadotropins, subclinical hypothyroidism, macroprolactinemia. \par \par 1. Prediabetes - . Lifestyle modification advised. \par \par 2. Elevated gonadotropins - normal testosterone values. Attributed to HIV disease. Pending CT head (unable to have MRI due to PPM - without contrast due to renal disease) Small testicular size on scrotal US\par \par 3. Subclinical hypothyroidism - repeat TFTs and plan on replacement. \par \par 4. Macroprolactinemia - non functioning with normal testosterone value\par \par RV 3 months

## 2019-06-01 NOTE — ED ADULT NURSE NOTE - DOES PATIENT HAVE ADVANCE DIRECTIVE
Patient : Nicole Holder Age: 61 2 times daily as needed for Anxiety.  Qty: 60 tablet, Refills: 0      benztropine (COGENTIN) 0.5 MG tablet Take 1 tablet by mouth 2 times daily.  Qty: 56 tablet, Refills: 0      fenofibrate (TRICOR) 48 MG tablet Take 1 tablet by mouth daily.  Qty: 90 tablet, Refills: 1      ARIPiprazole (ABILIFY) 20 MG tablet Take 1 tablet by mouth nightly.  Qty: 90 tablet, Refills: 1      atorvastatin (LIPITOR) 40 MG tablet TAKE 1 TABLET BY MOUTH ONCE DAILY  Qty: 28 tablet, Refills: 12      glipiZIDE (GLUCOTROL) 10 MG tablet TAKE 2 TABLETS (20MG) BY MOUTH TWICE    DAILY BEFORE MEALS  Qty: 112 tablet, Refills: 12      mesalamine (LIALDA) 1.2 g EC tablet TAKE 2 TABLETS (2.4GM) BY MOUTH ONCE    DAILY WITH BREAKFAST  Qty: 56 tablet, Refills: 12      metoPROLOL succinate (TOPROL XL) 25 MG 24 hr tablet Take 1 tablet by mouth daily.  Qty: 30 tablet, Refills: 3      metformin (GLUCOPHAGE) 1000 MG tablet TAKE 1 TABLET BY MOUTH TWICE DAILY WITH MEALS  Qty: 56 tablet, Refills: 11      benzonatate (TESSALON PERLES) 200 MG capsule Take 1 capsule by mouth 3 times daily as needed for Cough.  Qty: 270 capsule, Refills: 3      dextrose (GLUCOSE 15) 40 % Gel Take 1 Tube by mouth as needed (hypoglycemia, glucose lower than 70).  Qty: 15 g, Refills: 3      polyethylene glycol-electrolytes (NULYTELY WITH FLAVOR PACKS) 420 g solution See administration information/ directions  Qty: 4000 mL, Refills: 0      pantoprazole (PROTONIX) 40 MG tablet Take 1 tablet by mouth 2 times daily (before meals).  Qty: 60 tablet, Refills: 3      busPIRone (BUSPAR) 5 MG tablet TAKE 1 TABLET BY MOUTH EVERY 12 HOURS  Qty: 56 tablet, Refills: 11      nicotine (NICOTINE STEP 1) 21 MG/24HR patch Place 1 patch onto the skin every 24 hours.  Qty: 28 patch, Refills: 0      insulin glargine (LANTUS SOLOSTAR) 100 UNIT/ML pen-injector Inject 20 Units into the skin nightly.  Qty: 15 mL, Refills: 0      insulin lispro (HUMALOG KWIKPEN) 100 UNIT/ML pen-injector Inject  10 Units into the skin 3 times daily (before meals).  Qty: 15 mL, Refills: 12      carboxymethylcellulose-glycerin-polysorbate (REFRESH OPTIVE ADVANCED) 0.5-1-0.5 % ophthalmic solution Apply 1 drop to eye as needed (Use every hour or two as needed for dry eye).  Qty: 10 mL, Refills: 12      meclizine HCl (ANTIVERT) 25 MG tablet Take 1 tablet by mouth 3 times daily as needed for Dizziness.  Qty: 30 tablet, Refills: 0      magnesium hydroxide (MILK OF MAGNESIA) 400 MG/5ML suspension Take 5 mLs by mouth daily as needed for Constipation or Heartburn.  Qty: 360 mL, Refills: 3      aspirin 81 MG tablet Take 1 tablet by mouth daily.  Qty: 90 tablet, Refills: 3      beclomethasone diprop (QVAR) 80 MCG/ACT inhaler Inhale 1 puff into the lungs 2 times daily.  Qty: 8.7 g, Refills: 3      guaiFENesin-DM, (ROBITUSSIN DM) 100-10 MG/5ML syrup Take 10 mLs by mouth every 4 hours as needed for Cough.  Qty: 118 mL, Refills: 0      Insulin Pen Needle 32G X 4 MM Misc Use to inject insulin 3 times daily. Remove needle cover(s) to expose needle before injecting.  Qty: 100 each, Refills: 11      Lancets (ONETOUCH ULTRASOFT) Misc CHECK FASTING BLOOD SUGAR EVERY MORNING AND 2 HOURS AFTER EACH MEAL Dx: E11.9  Qty: 150 each, Refills: 11      lidocaine (XYLOCAINE) 5 % ointment Apply topically as needed for Pain.  Qty: 250 g, Refills: 3      Magnesium Hydroxide 400 MG Chew Tab Chew 1 tablet by mouth daily.  Qty: 90 tablet, Refills: 1      ONE TOUCH ULTRA TEST test strip CHECK FASTING BLOOD SUGAR EVERY MORNING AND 2 HOURS AFTER EACH MEAL Dx: E11.9  Qty: 150 strip, Refills: 11      phenazopyridine (PYRIDIUM) 200 MG tablet Take 1 tablet by mouth 3 times daily as needed for Pain.  Qty: 10 tablet, Refills: 0      polyethylene glycol (MIRALAX) powder Take 17 g by mouth 2 times daily.  Qty: 255 g, Refills: 0      sertraline (ZOLOFT) 100 2017    Grade C Esophagitis - Dr Kong   • Adopted: Yes   Problem Relation Age of Onset   • Stroke Mother        Social History     Tobacco Use   • Smoking status: Current Some Day Smoker     Packs/day: 1.00     Years: 45.00     Pack years: 45.00     Types: Cigars, Cigarettes   • Smokeless tobacco: Never Used   • Tobacco comment: trying to cut down, using nicotine patches   Substance Use Topics   • Alcohol use: Yes     Alcohol/week: 0.0 oz     Frequency: Never     Comment: 2 glasses of wine per month   • Drug use: No     Types: Cocaine     Comment: denies for 2019       Review of Systems   Respiratory: Positive for cough and shortness of breath.    Musculoskeletal: Positive for myalgias.   All other systems reviewed and are negative.      Physical Exam     ED Triage Vitals [06/01/19 1449]   ED Triage Vitals Group      Temp 96.3 °F (35.7 °C)      Pulse 99      Resp 20      /79      SpO2 94 %      EtCO2 mmHg       Height       Weight       Weight Scale Used        Physical Exam   Constitutional: She is oriented to person, place, and time. She appears well-developed and well-nourished. No distress.   HENT:   Head: Normocephalic and atraumatic.   Mouth/Throat: Oropharynx is clear and moist.   Eyes: Conjunctivae are normal. Right eye exhibits no discharge. Left eye exhibits no discharge.   Neck: Normal range of motion. No tracheal deviation present.   Cardiovascular: Normal rate, normal heart sounds and intact distal pulses. Exam reveals no gallop and no friction rub.   No murmur heard.  Pulmonary/Chest: Effort normal and breath sounds normal. No respiratory distress. She has no rales.   End expiratory wheezing with coughing   Abdominal: Soft. Bowel sounds are normal. She exhibits no distension and no mass. There is no tenderness. There is no rebound and no guarding.   Musculoskeletal: Normal range of motion. She exhibits no edema.   Neurological: She is alert and oriented to person, place, and time. No cranial nerve deficit. She exhibits normal muscle  No YAIR QUINTANA               Our Lady of Mercy [facility])     [facility] ([facility], [facility], [facility]: [facility] [[id number]]       Yair Quintana  MD  06/01/19 0832

## 2019-06-26 ENCOUNTER — RESULT REVIEW (OUTPATIENT)
Age: 69
End: 2019-06-26

## 2019-06-26 LAB
T4 FREE SERPL-MCNC: 1.5 NG/DL
TSH SERPL-ACNC: 4.51 UIU/ML

## 2019-07-11 ENCOUNTER — APPOINTMENT (OUTPATIENT)
Dept: ENDOCRINOLOGY | Facility: CLINIC | Age: 69
End: 2019-07-11
Payer: COMMERCIAL

## 2019-07-11 ENCOUNTER — APPOINTMENT (OUTPATIENT)
Dept: NEPHROLOGY | Facility: CLINIC | Age: 69
End: 2019-07-11
Payer: COMMERCIAL

## 2019-07-11 VITALS
WEIGHT: 151 LBS | DIASTOLIC BLOOD PRESSURE: 60 MMHG | OXYGEN SATURATION: 95 % | SYSTOLIC BLOOD PRESSURE: 98 MMHG | BODY MASS INDEX: 25.16 KG/M2 | HEART RATE: 75 BPM | HEIGHT: 65 IN

## 2019-07-11 VITALS
DIASTOLIC BLOOD PRESSURE: 68 MMHG | HEART RATE: 65 BPM | HEIGHT: 65 IN | WEIGHT: 151.68 LBS | SYSTOLIC BLOOD PRESSURE: 116 MMHG | OXYGEN SATURATION: 98 % | BODY MASS INDEX: 25.27 KG/M2

## 2019-07-11 PROCEDURE — 99214 OFFICE O/P EST MOD 30 MIN: CPT

## 2019-07-11 RX ORDER — LEVOTHYROXINE SODIUM 0.05 MG/1
50 TABLET ORAL DAILY
Qty: 30 | Refills: 3 | Status: DISCONTINUED | COMMUNITY
Start: 2019-04-04 | End: 2019-07-11

## 2019-07-11 NOTE — HISTORY OF PRESENT ILLNESS
[FreeTextEntry1] : Mr Watkins is a 68 y/o male with H/O  HIV ( > 20 years)  on tivicay instead of  descovy, which was  changed in dec 2018,  CKD (10 years, with baseline creatinine of 1.6- 2 mg/dl ) Tooth aspiration s/p bronchoscopy for removal ( Jan 2019: creat on admission 1.7 and 2.1 mg/dl on discharge) comes in for a follow up visit.  \par \par Feels OK.  No interval infections or hospitalizations. Never underwent the MRI of the pituitary. Thyroid sonogram as well as the testicular sonogram was negative. Recently with increase in the synthroid dose\par \par  \par  \par  \par

## 2019-07-11 NOTE — ASSESSMENT
[FreeTextEntry1] : 70 y/o male with HIV, CKD( Baseline creatinine 1.5-2 mg/dl)  here for evaluation:\par \par CKD:( Baseline creatinine 1.5-2 mg/dl) \par Recently his serum creatinine had been ranging in the 1.5-1.7 mg/dl range, although it had been in the 2  range in 2013/2014. Most recent serum creatinine is up at 1.8 mg/dl which is slightly higher than what he has been recently. Of note, it was up at 2.1 mg/dl on the day of discharge in January. \par He is now on Doultegavir which can inhibit the tubular creatinine secretion by blocking the organic cation transporter located at the basolateral side of proximal tubular cell. This is not a true drop in the GFR. \par Unlikely for this to be responsible for the bump in serum creatinine as his blood work was not significantly altered when checked one month post medication change\par \par For now will make no changes\par Repeat CKD blood  work in august\par Also repeat U/A, ur P/C at that time \par BP perfect and euvolemic on exam \par Repeat Renal sonogram done in 5/18: simple left renal cyst\par Maintain on Vit D supplements \par  \par HIV: management per ID\par F/U in 6 month\par \par (c) [phone]

## 2019-07-11 NOTE — REVIEW OF SYSTEMS
[Negative] : Genitourinary [Fever] : no fever [Chills] : no chills [Eyesight Problems] : no eyesight problems [Nosebleeds] : no nosebleeds [Sore Throat] : no sore throat [Chest Pain] : no chest pain [Shortness Of Breath] : no shortness of breath [Cough] : no cough [Abdominal Pain] : no abdominal pain [Vomiting] : no vomiting [Constipation] : no constipation [Diarrhea] : no diarrhea [Dysuria] : no dysuria [Hesitancy] : no urinary hesitancy [Nocturia] : no nocturia [Joint Swelling] : no joint swelling [Joint Stiffness] : no joint stiffness [Itching] : no itching [Change In A Mole] : no change in a mole [Dizziness] : no dizziness [Fainting] : no fainting [Anxiety] : no anxiety [Depression] : no depression [Easy Bruising] : no tendency for easy bruising [Easy Bleeding] : no tendency for easy bleeding

## 2019-07-11 NOTE — PHYSICAL EXAM
[General Appearance - In No Acute Distress] : in no acute distress [General Appearance - Alert] : alert [General Appearance - Well Nourished] : well nourished [Sclera] : the sclera and conjunctiva were normal [PERRL With Normal Accommodation] : pupils were equal in size, round, and reactive to light [Oropharynx] : the oropharynx was normal [Neck Appearance] : the appearance of the neck was normal [] : no respiratory distress [Neck Cervical Mass (___cm)] : no neck mass was observed [Respiration, Rhythm And Depth] : normal respiratory rhythm and effort [Exaggerated Use Of Accessory Muscles For Inspiration] : no accessory muscle use [Auscultation Breath Sounds / Voice Sounds] : lungs were clear to auscultation bilaterally [Heart Sounds Gallop] : no gallops [Heart Sounds] : normal S1 and S2 [Murmurs] : no murmurs [Heart Sounds Pericardial Friction Rub] : no pericardial rub [Edema] : there was no peripheral edema

## 2019-07-15 ENCOUNTER — TRANSCRIPTION ENCOUNTER (OUTPATIENT)
Age: 69
End: 2019-07-15

## 2019-07-15 ENCOUNTER — MEDICATION RENEWAL (OUTPATIENT)
Age: 69
End: 2019-07-15

## 2019-07-17 ENCOUNTER — FORM ENCOUNTER (OUTPATIENT)
Age: 69
End: 2019-07-17

## 2019-07-18 ENCOUNTER — APPOINTMENT (OUTPATIENT)
Dept: RADIOLOGY | Facility: CLINIC | Age: 69
End: 2019-07-18
Payer: COMMERCIAL

## 2019-07-18 ENCOUNTER — OUTPATIENT (OUTPATIENT)
Dept: OUTPATIENT SERVICES | Facility: HOSPITAL | Age: 69
LOS: 1 days | End: 2019-07-18
Payer: COMMERCIAL

## 2019-07-18 ENCOUNTER — APPOINTMENT (OUTPATIENT)
Dept: ELECTROPHYSIOLOGY | Facility: CLINIC | Age: 69
End: 2019-07-18
Payer: COMMERCIAL

## 2019-07-18 VITALS
DIASTOLIC BLOOD PRESSURE: 78 MMHG | HEART RATE: 61 BPM | BODY MASS INDEX: 24.16 KG/M2 | OXYGEN SATURATION: 97 % | WEIGHT: 145 LBS | HEIGHT: 65 IN | SYSTOLIC BLOOD PRESSURE: 146 MMHG

## 2019-07-18 DIAGNOSIS — R79.9 ABNORMAL FINDING OF BLOOD CHEMISTRY, UNSPECIFIED: ICD-10-CM

## 2019-07-18 PROCEDURE — 77080 DXA BONE DENSITY AXIAL: CPT

## 2019-07-18 PROCEDURE — 77080 DXA BONE DENSITY AXIAL: CPT | Mod: 26

## 2019-07-18 PROCEDURE — 93280 PM DEVICE PROGR EVAL DUAL: CPT

## 2019-07-26 ENCOUNTER — OTHER (OUTPATIENT)
Age: 69
End: 2019-07-26

## 2019-07-26 NOTE — ASSESSMENT
[FreeTextEntry1] : 68 yo M with PMH HIV, PAF post ablation X 2, post PPM, h/o of cholangitis, Fhx of FAP, HTN, HLD, drug induced pancreatitis, CKD, CAD, prediabetes, elevated gonadotropins, subclinical hypothyroidism, macroprolactinemia. , low iPTH\par \par 1. Prediabetes - . Lifestyle modification advised. \par \par 2. Elevated gonadotropins - normal testosterone values. Attributed to HIV disease.  CT head as above (unable to have MRI due to PPM - without contrast due to renal disease) Small testicular size on scrotal US\par \par 3. Subclinical hypothyroidism - given rx to repeat tfts 4 weeks after dose of LT4 was increased 75 mcg qd. \par \par 4. Thyroid nodules - repeat thyroid US 1 year from last - 3/2020\par \par 5. Macroprolactinemia - non functioning with normal testosterone value\par \par 6. low iPTH - repeat today with CaSR mutational analysis\par \par RV 3 months

## 2019-07-26 NOTE — HISTORY OF PRESENT ILLNESS
[FreeTextEntry1] : 70 yo M with PMH HIV, PAF post ablation X 2, post PPM, h/o of cholangitis, Fhx of FAP, HTN, HLD, drug induced pancreatitis, CKD, CAD, prediabetes, elevated gonadotropins, subclinical hypothyroidism, macroprolactinemia. \par \par Originally presented for management of prediabetes\par He was diagnosed with prediabetes 8/28/15\par He was then found to have elevated gonadotropins with FSH to 25.5 and LH to 21.5 with normal testosterone values.\par 3/8/19 scrotal US showed small testicular size and 7 mm right epididymal cyst.\par Also found to have subclinical hypothyroidism - confirmed, with TSH of 6.75 which normalized and upon repeat was elevated to 7.81\par started on LT4 after last visit - now titrated to 75 mcg qd 6/26/19\par previously on amiodarone for 3 months (5 years ago)\par Also diagnosed with macroprolactinemia \par subcentimeter spongiform thyroid nodule and small cysts found of thyroid US 3/14/19 . ct C- from the same day revealed normal pituitary.\par also found to have a a low iPTH. no personal or fhx of calcium/phos/mag disease. . \par \par

## 2019-07-26 NOTE — ADDENDUM
[FreeTextEntry1] : osteopenia of the wrist on DEXA. normal at all other sites.  (denies h/o fractures, further risk factor screen negative) d/w patient.

## 2019-07-29 ENCOUNTER — RX RENEWAL (OUTPATIENT)
Age: 69
End: 2019-07-29

## 2019-07-29 ENCOUNTER — MEDICATION RENEWAL (OUTPATIENT)
Age: 69
End: 2019-07-29

## 2019-08-21 LAB
24R-OH-CALCIDIOL SERPL-MCNC: 43.7 PG/ML
25(OH)D3 SERPL-MCNC: 27.3 NG/ML
ALBUMIN SERPL ELPH-MCNC: 4.2 G/DL
ALP BLD-CCNC: 31 U/L
ALT SERPL-CCNC: 16 U/L
ANION GAP SERPL CALC-SCNC: 12 MMOL/L
AST SERPL-CCNC: 23 U/L
BILIRUB SERPL-MCNC: 0.5 MG/DL
BUN SERPL-MCNC: 50 MG/DL
CALCIUM SERPL-MCNC: 9.4 MG/DL
CALCIUM SERPL-MCNC: 9.4 MG/DL
CHLORIDE SERPL-SCNC: 107 MMOL/L
CHOLEST SERPL-MCNC: 171 MG/DL
CHOLEST/HDLC SERPL: 3.9 RATIO
CO2 SERPL-SCNC: 23 MMOL/L
CREAT SERPL-MCNC: 1.83 MG/DL
ESTIMATED AVERAGE GLUCOSE: 114 MG/DL
ESTRADIOL SERPL-MCNC: 37 PG/ML
FSH SERPL-MCNC: 27 IU/L
GLUCOSE SERPL-MCNC: 106 MG/DL
HBA1C MFR BLD HPLC: 5.6 %
HDLC SERPL-MCNC: 44 MG/DL
INR PPP: 1.62 RATIO
LDLC SERPL CALC-MCNC: 97 MG/DL
LH SERPL-ACNC: 21.8 IU/L
MAGNESIUM SERPL-MCNC: 2 MG/DL
MONOMERIC PROLACTIN (ICMA)*: 8.4 NG/ML
PARATHYROID HORMONE INTACT: 15 PG/ML
PERCENT MACROPROLACTIN: 70 %
PHOSPHATE SERPL-MCNC: 3.7 MG/DL
POTASSIUM SERPL-SCNC: 4.7 MMOL/L
PROLACTIN SERPL-MCNC: 17.8 NG/ML
PROLACTIN, SERUM (ICMA)*: 28 NG/ML
PROT SERPL-MCNC: 7 G/DL
PT BLD: 18.6 SEC
SHBG SERPL-SCNC: 69 NMOL/L
SODIUM SERPL-SCNC: 142 MMOL/L
T4 FREE SERPL-MCNC: 1.4 NG/DL
TESTOST BND SERPL-MCNC: 11.1 NG/DL
TESTOST SERPL-MCNC: 793 NG/DL
TESTOSTERONE BIOAVAILABLE: 87 NG/DL
TESTOSTERONE TOTAL S: 795 NG/DL
THYROGLOB AB SERPL-ACNC: <20 IU/ML
THYROPEROXIDASE AB SERPL IA-ACNC: 19.7 IU/ML
TRIGL SERPL-MCNC: 149 MG/DL
TSH SERPL-ACNC: 3.36 UIU/ML
URATE SERPL-MCNC: 6.1 MG/DL

## 2019-08-26 ENCOUNTER — APPOINTMENT (OUTPATIENT)
Dept: INFECTIOUS DISEASE | Facility: CLINIC | Age: 69
End: 2019-08-26
Payer: COMMERCIAL

## 2019-08-26 VITALS
HEIGHT: 65 IN | BODY MASS INDEX: 25.49 KG/M2 | OXYGEN SATURATION: 96 % | TEMPERATURE: 97 F | HEART RATE: 68 BPM | SYSTOLIC BLOOD PRESSURE: 133 MMHG | WEIGHT: 153 LBS | DIASTOLIC BLOOD PRESSURE: 78 MMHG

## 2019-08-26 PROCEDURE — 99214 OFFICE O/P EST MOD 30 MIN: CPT

## 2019-08-26 NOTE — ASSESSMENT
[FreeTextEntry1] : HIV suppressed on Prezista/Norvir and Tivicay\par Follows regularly with endocrinology and nephrology\par Renal function stable with GFR in mid-upper 30s\par No difference when Cystatin C GFR estimate, so impact of DTG on calculated GFR is minimal\par Only need CD4 and VL as has had other labs recently\par Will use vitamin D over the summer\par Flu shot before travel in the fall\par Seeing dental for implants, goes back in October\par Colonic polyp 2017, F/U 5 years\par Shingrix if available\par \par

## 2019-08-26 NOTE — PHYSICAL EXAM
[General Appearance - Alert] : alert [General Appearance - In No Acute Distress] : in no acute distress [Sclera] : the sclera and conjunctiva were normal [PERRL With Normal Accommodation] : pupils were equal in size, round, reactive to light [Extraocular Movements] : extraocular movements were intact [Outer Ear] : the ears and nose were normal in appearance [Oropharynx] : the oropharynx was normal with no thrush [Neck Appearance] : the appearance of the neck was normal [Neck Cervical Mass (___cm)] : no neck mass was observed [Jugular Venous Distention Increased] : there was no jugular-venous distention [Thyroid Diffuse Enlargement] : the thyroid was not enlarged [Auscultation Breath Sounds / Voice Sounds] : lungs were clear to auscultation bilaterally [Heart Rate And Rhythm] : heart rate was normal and rhythm regular [Heart Sounds] : normal S1 and S2 [Heart Sounds Gallop] : no gallops [Murmurs] : no murmurs [Heart Sounds Pericardial Friction Rub] : no pericardial rub [Full Pulse] : the pedal pulses are present [Edema] : there was no peripheral edema [Bowel Sounds] : normal bowel sounds [Abdomen Soft] : soft [Abdomen Tenderness] : non-tender [Abdomen Mass (___ Cm)] : no abdominal mass palpated [Costovertebral Angle Tenderness] : no CVA tenderness [No Palpable Adenopathy] : no palpable adenopathy [Musculoskeletal - Swelling] : no joint swelling [Motor Tone] : muscle strength and tone were normal [Nail Clubbing] : no clubbing  or cyanosis of the fingernails [Skin Color & Pigmentation] : normal skin color and pigmentation [] : no rash [Deep Tendon Reflexes (DTR)] : deep tendon reflexes were 2+ and symmetric [No Focal Deficits] : no focal deficits [Sensation] : the sensory exam was normal to light touch and pinprick [Oriented To Time, Place, And Person] : oriented to person, place, and time [Affect] : the affect was normal

## 2019-08-26 NOTE — HISTORY OF PRESENT ILLNESS
[Monogamous] : monogamous [FreeTextEntry1] : HIV suppressed on Norvir/Prezista/Tivicay\par CKD, off TAF with marginal renal function\par Tolerating well\par Takes meds in the evenings\par No adverse effects\par No missed doses\par \par No intercurrent illness\par Playing golf\par \par Traveled to /[place], going to [facility] and [facility] in the fall\par \par

## 2019-08-27 LAB
CD3 CELLS # BLD: 1091 /UL
CD3 CELLS NFR BLD: 64 %
CD3+CD4+ CELLS # BLD: 510 /UL
CD3+CD4+ CELLS NFR BLD: 30 %
CD3+CD4+ CELLS/CD3+CD8+ CLL SPEC: 0.91 RATIO
CD3+CD8+ CELLS # SPEC: 560 /UL
CD3+CD8+ CELLS NFR BLD: 33 %
HIV1 RNA # SERPL NAA+PROBE: NORMAL
HIV1 RNA # SERPL NAA+PROBE: NORMAL COPIES/ML
VIRAL LOAD INTERP: NORMAL
VIRAL LOAD LOG: NORMAL LG COP/ML

## 2019-08-28 ENCOUNTER — APPOINTMENT (OUTPATIENT)
Dept: INFECTIOUS DISEASE | Facility: CLINIC | Age: 69
End: 2019-08-28
Payer: COMMERCIAL

## 2019-08-28 PROCEDURE — 90471 IMMUNIZATION ADMIN: CPT

## 2019-08-28 PROCEDURE — 90750 HZV VACC RECOMBINANT IM: CPT

## 2019-09-10 LAB
MISCELLANEOUS TEST: NORMAL
PROC NAME: NORMAL

## 2019-09-23 ENCOUNTER — RX RENEWAL (OUTPATIENT)
Age: 69
End: 2019-09-23

## 2019-10-14 ENCOUNTER — RX RENEWAL (OUTPATIENT)
Age: 69
End: 2019-10-14

## 2019-11-14 ENCOUNTER — APPOINTMENT (OUTPATIENT)
Dept: ENDOCRINOLOGY | Facility: CLINIC | Age: 69
End: 2019-11-14
Payer: COMMERCIAL

## 2019-11-14 VITALS
OXYGEN SATURATION: 98 % | BODY MASS INDEX: 25.66 KG/M2 | HEIGHT: 65 IN | SYSTOLIC BLOOD PRESSURE: 130 MMHG | DIASTOLIC BLOOD PRESSURE: 70 MMHG | HEART RATE: 60 BPM | WEIGHT: 154 LBS | TEMPERATURE: 97.4 F

## 2019-11-14 PROCEDURE — 99214 OFFICE O/P EST MOD 30 MIN: CPT

## 2019-11-15 NOTE — HISTORY OF PRESENT ILLNESS
[FreeTextEntry1] : 70 yo M with PMH HIV, PAF post ablation X 2, post PPM, h/o of cholangitis, Fhx of FAP, HTN, HLD, drug induced pancreatitis, CKD, CAD, prediabetes, elevated gonadotropins, subclinical hypothyroidism, macroprolactinemia. \par \par Originally presented for management of prediabetes\par He was diagnosed with prediabetes 8/28/15\par He was then found to have elevated gonadotropins with FSH to 25.5 and LH to 21.5 with normal testosterone values.\par 3/8/19 scrotal US showed small testicular size and 7 mm right epididymal cyst.\par Also found to have subclinical hypothyroidism - confirmed, with TSH of 6.75 which normalized and upon repeat was elevated to 7.81\par started on LT4 after last visit - now titrated to 75 mcg qd 6/26/19\par previously on amiodarone for 3 months (5 years ago)\par Also diagnosed with macroprolactinemia \par subcentimeter spongiform thyroid nodule and small cysts found of thyroid US 3/14/19 . ct C- from the same day revealed normal pituitary.\par also found to have a a low iPTH. no personal or fhx of calcium/phos/mag disease. . normalized on repeat testing. 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

## 2019-11-15 NOTE — ASSESSMENT
[FreeTextEntry1] : 68 yo M with PMH HIV, PAF post ablation X 2, post PPM, h/o of cholangitis, Fhx of FAP, HTN, HLD, drug induced pancreatitis, CKD, CAD, prediabetes, elevated gonadotropins, subclinical hypothyroidism, macroprolactinemia. , low iPTH\par \par 1. Prediabetes - . Lifestyle modification advised. \par \par 2. Elevated gonadotropins - normal testosterone values. Attributed to HIV disease.  CT head as above (unable to have MRI due to PPM - without contrast due to renal disease) Small testicular size on scrotal US will refer for  klinefelters analysis for completion\par \par 3. Subclinical hypothyroidism - continue LT4 75 mcg qd. \par \par 4. Thyroid nodules - repeat thyroid US 1 year from last - 3/2020\par \par 5. Macroprolactinemia - non functioning with normal testosterone value\par \par 6. 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

## 2019-11-16 ENCOUNTER — LABORATORY RESULT (OUTPATIENT)
Age: 69
End: 2019-11-16

## 2019-11-25 ENCOUNTER — OTHER (OUTPATIENT)
Age: 69
End: 2019-11-25

## 2019-12-02 LAB
24R-OH-CALCIDIOL SERPL-MCNC: 42.8 PG/ML
25(OH)D3 SERPL-MCNC: 42.8 NG/ML
ALBUMIN SERPL ELPH-MCNC: 4.1 G/DL
ALP BLD-CCNC: 31 U/L
ALT SERPL-CCNC: 18 U/L
ANION GAP SERPL CALC-SCNC: 12 MMOL/L
AST SERPL-CCNC: 26 U/L
BILIRUB SERPL-MCNC: 0.4 MG/DL
BUN SERPL-MCNC: 38 MG/DL
CALCIUM SERPL-MCNC: 9.2 MG/DL
CALCIUM SERPL-MCNC: 9.2 MG/DL
CHLORIDE SERPL-SCNC: 106 MMOL/L
CO2 SERPL-SCNC: 23 MMOL/L
CREAT SERPL-MCNC: 1.7 MG/DL
ESTIMATED AVERAGE GLUCOSE: 120 MG/DL
ESTRADIOL SERPL-MCNC: 43 PG/ML
FSH SERPL-MCNC: 26.1 IU/L
GLUCOSE SERPL-MCNC: 100 MG/DL
HBA1C MFR BLD HPLC: 5.8 %
INR PPP: 2.91 RATIO
LH SERPL-ACNC: 21.7 IU/L
MAGNESIUM SERPL-MCNC: 1.9 MG/DL
MONOMERIC PROLACTIN (ICMA)*: 12 NG/ML
PARATHYROID HORMONE INTACT: 18 PG/ML
PERCENT MACROPROLACTIN: 69 %
PHOSPHATE SERPL-MCNC: 4 MG/DL
POTASSIUM SERPL-SCNC: 4.3 MMOL/L
PROLACTIN SERPL-MCNC: 23.8 NG/ML
PROLACTIN, SERUM (ICMA)*: 39 NG/ML
PROT SERPL-MCNC: 6.7 G/DL
PT BLD: 34.3 SEC
SHBG SERPL-SCNC: 67 NMOL/L
SODIUM SERPL-SCNC: 141 MMOL/L
T4 FREE SERPL-MCNC: 1.4 NG/DL
TESTOST BND SERPL-MCNC: 12.8 NG/DL
TESTOST SERPL-MCNC: 702 NG/DL
TESTOSTERONE BIOAVAILABLE: 66 NG/DL
TESTOSTERONE TOTAL S: 713 NG/DL
TSH SERPL-ACNC: 2.33 UIU/ML
URATE SERPL-MCNC: 7.3 MG/DL

## 2019-12-13 ENCOUNTER — RX RENEWAL (OUTPATIENT)
Age: 69
End: 2019-12-13

## 2019-12-19 ENCOUNTER — APPOINTMENT (OUTPATIENT)
Dept: NEPHROLOGY | Facility: CLINIC | Age: 69
End: 2019-12-19
Payer: COMMERCIAL

## 2019-12-19 VITALS
OXYGEN SATURATION: 98 % | DIASTOLIC BLOOD PRESSURE: 71 MMHG | WEIGHT: 154.32 LBS | SYSTOLIC BLOOD PRESSURE: 124 MMHG | BODY MASS INDEX: 25.71 KG/M2 | HEART RATE: 61 BPM | HEIGHT: 65 IN

## 2019-12-19 DIAGNOSIS — Z87.898 PERSONAL HISTORY OF OTHER SPECIFIED CONDITIONS: ICD-10-CM

## 2019-12-19 PROCEDURE — 99213 OFFICE O/P EST LOW 20 MIN: CPT

## 2019-12-19 NOTE — HISTORY OF PRESENT ILLNESS
[FreeTextEntry1] : Mr Watkins is a 68 y/o male with H/O  HIV ( > 20 years)  on tivicay instead of  descovy, which was  changed in dec 2018,  CKD (10 years, with baseline creatinine of 1.6- 2 mg/dl ) Tooth aspiration s/p bronchoscopy for removal ( Jan 2019: creat on admission 1.7 and 2.1 mg/dl on discharge).  Thyroid sonogram as well as the testicular sonogram was negative. \par \par Feels OK.  No interval infections or hospitalizations.  Saw a urologist recently and everything was ok \par  Complaint with all his medications\par  \par

## 2019-12-19 NOTE — ASSESSMENT
[FreeTextEntry1] : 68 y/o male with HIV, CKD( Baseline creatinine 1.5-2 mg/dl)  here for evaluation:\par \par CKD:( Baseline creatinine 1.5-2 mg/dl) \par Recently his serum creatinine had been ranging in the 1.5-1.7 mg/dl range, although it had been in the 2  range in 2013/2014. Most recent serum creatinine was  up to 1.8 mg/dl which is slightly higher than what he has been recently. Of note, it was up at 2.1 mg/dl on the day of discharge in January. \par He is now on Doultegavir which can inhibit the tubular creatinine secretion by blocking the organic cation transporter located at the basolateral side of proximal tubular cell. This is not a true drop in the GFR. \par Unlikely for this to be responsible for the bump in serum creatinine as his blood work was not significantly altered when checked one month post medication change\par \par Serum creatinine down at 1.6 \par Electrolytes stable\par BP perfect and euvolemic on exam \par Repeat Renal sonogram done in 5/18: simple left renal cyst\par Maintain on Vit D supplements \par  \par HIV: management per ID\par F/U in 6 month\par \par (c) [phone]

## 2019-12-19 NOTE — REVIEW OF SYSTEMS
[Negative] : Respiratory [Fever] : no fever [Eyesight Problems] : no eyesight problems [Chills] : no chills [Abdominal Pain] : no abdominal pain [Cough] : no cough [Shortness Of Breath] : no shortness of breath [Vomiting] : no vomiting [Constipation] : no constipation [Diarrhea] : no diarrhea [Itching] : no itching [Dizziness] : no dizziness [Change In A Mole] : no change in a mole [Fainting] : no fainting [Anxiety] : no anxiety [Depression] : no depression

## 2019-12-19 NOTE — PHYSICAL EXAM
[General Appearance - Alert] : alert [General Appearance - Well Nourished] : well nourished [General Appearance - In No Acute Distress] : in no acute distress [Sclera] : the sclera and conjunctiva were normal [PERRL With Normal Accommodation] : pupils were equal in size, round, and reactive to light [Oropharynx] : the oropharynx was normal [Respiration, Rhythm And Depth] : normal respiratory rhythm and effort [] : no respiratory distress [Exaggerated Use Of Accessory Muscles For Inspiration] : no accessory muscle use [Heart Sounds] : normal S1 and S2 [Auscultation Breath Sounds / Voice Sounds] : lungs were clear to auscultation bilaterally [Murmurs] : no murmurs [Heart Sounds Gallop] : no gallops [Edema] : there was no peripheral edema [Heart Sounds Pericardial Friction Rub] : no pericardial rub [Examination Of The Oral Cavity] : the lips and gums were normal

## 2019-12-23 LAB
ALBUMIN SERPL ELPH-MCNC: 4.4 G/DL
ALP BLD-CCNC: 38 U/L
ALT SERPL-CCNC: 17 U/L
ANION GAP SERPL CALC-SCNC: 15 MMOL/L
AST SERPL-CCNC: 26 U/L
BILIRUB SERPL-MCNC: 0.4 MG/DL
BUN SERPL-MCNC: 37 MG/DL
CALCIUM SERPL-MCNC: 9.6 MG/DL
CHLORIDE SERPL-SCNC: 103 MMOL/L
CO2 SERPL-SCNC: 26 MMOL/L
CREAT SERPL-MCNC: 1.69 MG/DL
ESTRADIOL SERPL-MCNC: 45 PG/ML
FSH SERPL-MCNC: 29.4 IU/L
GLUCOSE SERPL-MCNC: 103 MG/DL
LH SERPL-ACNC: 25.9 IU/L
MONOMERIC PROLACTIN (ICMA)*: 8.9 NG/ML
PERCENT MACROPROLACTIN: 75 %
POTASSIUM SERPL-SCNC: 4.4 MMOL/L
PROLACTIN SERPL-MCNC: 20.9 NG/ML
PROLACTIN, SERUM (ICMA)*: 36 NG/ML
PROT SERPL-MCNC: 7.4 G/DL
SODIUM SERPL-SCNC: 143 MMOL/L
TESTOST SERPL-MCNC: 923 NG/DL

## 2020-01-02 LAB
SHBG SERPL-SCNC: 83 NMOL/L
TESTOST BND SERPL-MCNC: 16.5 NG/DL
TESTOSTERONE BIOAVAILABLE: 132 NG/DL
TESTOSTERONE TOTAL S: 1100 NG/DL

## 2020-01-23 ENCOUNTER — APPOINTMENT (OUTPATIENT)
Dept: ELECTROPHYSIOLOGY | Facility: CLINIC | Age: 70
End: 2020-01-23
Payer: COMMERCIAL

## 2020-01-23 VITALS
DIASTOLIC BLOOD PRESSURE: 75 MMHG | HEART RATE: 60 BPM | BODY MASS INDEX: 25.83 KG/M2 | HEIGHT: 65 IN | OXYGEN SATURATION: 99 % | WEIGHT: 155 LBS | SYSTOLIC BLOOD PRESSURE: 138 MMHG

## 2020-01-23 DIAGNOSIS — I49.5 SICK SINUS SYNDROME: ICD-10-CM

## 2020-01-23 PROCEDURE — 93280 PM DEVICE PROGR EVAL DUAL: CPT

## 2020-02-06 ENCOUNTER — TRANSCRIPTION ENCOUNTER (OUTPATIENT)
Age: 70
End: 2020-02-06

## 2020-02-24 ENCOUNTER — APPOINTMENT (OUTPATIENT)
Dept: INFECTIOUS DISEASE | Facility: CLINIC | Age: 70
End: 2020-02-24

## 2020-02-24 ENCOUNTER — APPOINTMENT (OUTPATIENT)
Dept: INFECTIOUS DISEASE | Facility: CLINIC | Age: 70
End: 2020-02-24
Payer: COMMERCIAL

## 2020-02-24 ENCOUNTER — MED ADMIN CHARGE (OUTPATIENT)
Age: 70
End: 2020-02-24

## 2020-02-24 VITALS
TEMPERATURE: 97.6 F | HEIGHT: 65 IN | WEIGHT: 159 LBS | HEART RATE: 60 BPM | SYSTOLIC BLOOD PRESSURE: 119 MMHG | RESPIRATION RATE: 16 BRPM | DIASTOLIC BLOOD PRESSURE: 62 MMHG | BODY MASS INDEX: 26.49 KG/M2 | OXYGEN SATURATION: 97 %

## 2020-02-24 PROCEDURE — 90471 IMMUNIZATION ADMIN: CPT

## 2020-02-24 PROCEDURE — 99214 OFFICE O/P EST MOD 30 MIN: CPT | Mod: 25

## 2020-02-24 PROCEDURE — 90750 HZV VACC RECOMBINANT IM: CPT | Mod: GY

## 2020-02-24 RX ORDER — AZITHROMYCIN 250 MG/1
250 TABLET, FILM COATED ORAL
Qty: 6 | Refills: 0 | Status: COMPLETED | COMMUNITY
Start: 2019-12-04

## 2020-02-24 RX ORDER — LEVOTHYROXINE SODIUM 0.03 MG/1
25 TABLET ORAL
Qty: 90 | Refills: 0 | Status: DISCONTINUED | COMMUNITY
Start: 2019-12-20

## 2020-02-24 NOTE — ASSESSMENT
[FreeTextEntry1] : HIV doing well on Prezista/Norvir and Tivicay\par Check CD4, VL, renal function, cystatin C GFR\par Check INR on coumadin\par Check HgA1c, will get lipids outside\par Check Vitamin D\par Second Shingrix\par

## 2020-02-24 NOTE — HISTORY OF PRESENT ILLNESS
[Sexually Active] : The patient is sexually active [Monogamous] : monogamous [FreeTextEntry1] : HIV doing well on Daily Norvir/Prezista and Tivicay\par Tolerating well\par Takes after dinner.\par No missed doses\par \par Has seen endo and nephrology\par No intercurrent illness\par In April going to [facility]\par and then [place] and [place] \par

## 2020-02-24 NOTE — PHYSICAL EXAM
[General Appearance - Alert] : alert [General Appearance - In No Acute Distress] : in no acute distress [Extraocular Movements] : extraocular movements were intact [Sclera] : the sclera and conjunctiva were normal [PERRL With Normal Accommodation] : pupils were equal in size, round, reactive to light [Outer Ear] : the ears and nose were normal in appearance [Oropharynx] : the oropharynx was normal with no thrush [Neck Appearance] : the appearance of the neck was normal [Jugular Venous Distention Increased] : there was no jugular-venous distention [Neck Cervical Mass (___cm)] : no neck mass was observed [Thyroid Diffuse Enlargement] : the thyroid was not enlarged [Auscultation Breath Sounds / Voice Sounds] : lungs were clear to auscultation bilaterally [Heart Sounds] : normal S1 and S2 [Heart Rate And Rhythm] : heart rate was normal and rhythm regular [Heart Sounds Gallop] : no gallops [Murmurs] : no murmurs [Heart Sounds Pericardial Friction Rub] : no pericardial rub [Edema] : there was no peripheral edema [Full Pulse] : the pedal pulses are present [Bowel Sounds] : normal bowel sounds [Abdomen Soft] : soft [Abdomen Tenderness] : non-tender [Abdomen Mass (___ Cm)] : no abdominal mass palpated [Costovertebral Angle Tenderness] : no CVA tenderness [No Palpable Adenopathy] : no palpable adenopathy [Musculoskeletal - Swelling] : no joint swelling [Motor Tone] : muscle strength and tone were normal [Nail Clubbing] : no clubbing  or cyanosis of the fingernails [] : no rash [Skin Color & Pigmentation] : normal skin color and pigmentation [FreeTextEntry1] : Tophi on finger joints [Sensation] : the sensory exam was normal to light touch and pinprick [Deep Tendon Reflexes (DTR)] : deep tendon reflexes were 2+ and symmetric [Oriented To Time, Place, And Person] : oriented to person, place, and time [No Focal Deficits] : no focal deficits [Affect] : the affect was normal

## 2020-02-28 LAB
25(OH)D3 SERPL-MCNC: 39 NG/ML
ALBUMIN SERPL ELPH-MCNC: 4.3 G/DL
ALP BLD-CCNC: 32 U/L
ALT SERPL-CCNC: 15 U/L
ANION GAP SERPL CALC-SCNC: 16 MMOL/L
APPEARANCE: CLEAR
AST SERPL-CCNC: 23 U/L
BACTERIA: NEGATIVE
BASOPHILS # BLD AUTO: 0.04 K/UL
BASOPHILS NFR BLD AUTO: 0.8 %
BILIRUB SERPL-MCNC: 0.4 MG/DL
BILIRUBIN URINE: NEGATIVE
BLOOD URINE: NEGATIVE
BUN SERPL-MCNC: 43 MG/DL
CALCIUM SERPL-MCNC: 9.2 MG/DL
CHLORIDE SERPL-SCNC: 106 MMOL/L
CO2 SERPL-SCNC: 23 MMOL/L
COLOR: NORMAL
CREAT SERPL-MCNC: 1.85 MG/DL
CYSTATIN C SERPL-MCNC: 1.63 MG/L
EOSINOPHIL # BLD AUTO: 0.15 K/UL
EOSINOPHIL NFR BLD AUTO: 3.2 %
ESTIMATED AVERAGE GLUCOSE: 123 MG/DL
GFR/BSA.PRED SERPLBLD CYS-BASED-ARV: 39 ML/MIN
GLUCOSE QUALITATIVE U: NEGATIVE
GLUCOSE SERPL-MCNC: 104 MG/DL
HBA1C MFR BLD HPLC: 5.9 %
HCT VFR BLD CALC: 44.7 %
HGB BLD-MCNC: 14.5 G/DL
HYALINE CASTS: 1 /LPF
IMM GRANULOCYTES NFR BLD AUTO: 0.2 %
INR PPP: 2.16 RATIO
KETONES URINE: NEGATIVE
LEUKOCYTE ESTERASE URINE: NEGATIVE
LYMPHOCYTES # BLD AUTO: 1.49 K/UL
LYMPHOCYTES NFR BLD AUTO: 31.4 %
MAN DIFF?: NORMAL
MCHC RBC-ENTMCNC: 29.5 PG
MCHC RBC-ENTMCNC: 32.4 GM/DL
MCV RBC AUTO: 90.9 FL
MICROSCOPIC-UA: NORMAL
MONOCYTES # BLD AUTO: 0.35 K/UL
MONOCYTES NFR BLD AUTO: 7.4 %
NEUTROPHILS # BLD AUTO: 2.7 K/UL
NEUTROPHILS NFR BLD AUTO: 57 %
NITRITE URINE: NEGATIVE
PH URINE: 6
PLATELET # BLD AUTO: 191 K/UL
POTASSIUM SERPL-SCNC: 4.7 MMOL/L
PROT SERPL-MCNC: 7.3 G/DL
PROTEIN URINE: NORMAL
PSA SERPL-MCNC: 0.81 NG/ML
PT BLD: 25.4 SEC
RBC # BLD: 4.92 M/UL
RBC # FLD: 13.3 %
RED BLOOD CELLS URINE: 1 /HPF
SODIUM SERPL-SCNC: 145 MMOL/L
SPECIFIC GRAVITY URINE: 1.02
SQUAMOUS EPITHELIAL CELLS: 0 /HPF
UROBILINOGEN URINE: NORMAL
WBC # FLD AUTO: 4.74 K/UL
WHITE BLOOD CELLS URINE: 0 /HPF

## 2020-03-01 LAB
CD3 CELLS # BLD: 891 /UL
CD3 CELLS NFR BLD: 63 %
CD3+CD4+ CELLS # BLD: 397 /UL
CD3+CD4+ CELLS NFR BLD: 28 %
CD3+CD4+ CELLS/CD3+CD8+ CLL SPEC: 0.84 RATIO
CD3+CD8+ CELLS # SPEC: 471 /UL
CD3+CD8+ CELLS NFR BLD: 33 %
CREAT SPEC-SCNC: 88 MG/DL
CREAT/PROT UR: 0.2 RATIO
HIV1 RNA # SERPL NAA+PROBE: ABNORMAL
HIV1 RNA # SERPL NAA+PROBE: ABNORMAL COPIES/ML
M TB IFN-G BLD-IMP: NEGATIVE
PROT UR-MCNC: 18 MG/DL
QUANTIFERON TB PLUS MITOGEN MINUS NIL: 7.06 IU/ML
QUANTIFERON TB PLUS NIL: 0.03 IU/ML
QUANTIFERON TB PLUS TB1 MINUS NIL: 0.02 IU/ML
QUANTIFERON TB PLUS TB2 MINUS NIL: 0.01 IU/ML
T PALLIDUM AB SER QL IA: NEGATIVE
VIRAL LOAD INTERP: NORMAL
VIRAL LOAD LOG: ABNORMAL LG COP/ML

## 2020-03-18 ENCOUNTER — RX RENEWAL (OUTPATIENT)
Age: 70
End: 2020-03-18

## 2020-03-19 ENCOUNTER — APPOINTMENT (OUTPATIENT)
Dept: ENDOCRINOLOGY | Facility: CLINIC | Age: 70
End: 2020-03-19
Payer: COMMERCIAL

## 2020-03-19 VITALS
DIASTOLIC BLOOD PRESSURE: 77 MMHG | HEART RATE: 70 BPM | HEIGHT: 65 IN | TEMPERATURE: 98.3 F | OXYGEN SATURATION: 100 % | BODY MASS INDEX: 24.99 KG/M2 | SYSTOLIC BLOOD PRESSURE: 127 MMHG | WEIGHT: 150 LBS

## 2020-03-19 PROCEDURE — 99213 OFFICE O/P EST LOW 20 MIN: CPT

## 2020-03-20 NOTE — ASSESSMENT
[FreeTextEntry1] : 69 yo M with PMH HIV, PAF post ablation X 2, post PPM, h/o of cholangitis, Fhx of FAP, HTN, HLD, drug induced pancreatitis, CKD, CAD, prediabetes, elevated gonadotropins, subclinical hypothyroidism, macroprolactinemia. , low iPTH\par \par 1. Prediabetes - . Lifestyle modification advised. \par \par 2. Elevated gonadotropins - latest with elevated testosterone values. Elevated gonadotropins Attributed to HIV disease.  CT head as above (unable to have MRI due to PPM - without contrast due to renal disease) Small testicular size on scrotal US \par \par 3. Subclinical hypothyroidism - continue LT4 75 mcg qd. \par \par 4. Thyroid nodules - repeat thyroid US now 1 year from last\par \par 5. Macroprolactinemia - non functioning with normal testosterone value\par \par 6. 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

## 2020-03-20 NOTE — HISTORY OF PRESENT ILLNESS
[FreeTextEntry1] : 69 yo M with PMH HIV, PAF post ablation X 2, post PPM, h/o of cholangitis, Fhx of FAP, HTN, HLD, drug induced pancreatitis, CKD, CAD, prediabetes, elevated gonadotropins, subclinical hypothyroidism, macroprolactinemia. \par \par Originally presented for management of prediabetes\par He was diagnosed with prediabetes 8/28/15\par He was then found to have elevated gonadotropins with FSH to 25.5 and LH to 21.5 with normal testosterone values.\par 3/8/19 scrotal US showed small testicular size and 7 mm right epididymal cyst.\par Also found to have subclinical hypothyroidism - confirmed, with TSH of 6.75 which normalized and upon repeat was elevated to 7.81\par started on LT4 after last visit - now titrated to 75 mcg qd 6/26/19\par previously on amiodarone for 3 months (5 years ago)\par Also diagnosed with macroprolactinemia \par subcentimeter spongiform thyroid nodule and small cysts found of thyroid US 3/14/19 . ct C- from the same day revealed normal pituitary.\par also found to have a a low iPTH. no personal or fhx of calcium/phos/mag disease. . normalized on repeat testing. 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

## 2020-04-26 ENCOUNTER — TRANSCRIPTION ENCOUNTER (OUTPATIENT)
Age: 70
End: 2020-04-26

## 2020-06-16 ENCOUNTER — RX RENEWAL (OUTPATIENT)
Age: 70
End: 2020-06-16

## 2020-06-18 ENCOUNTER — RX RENEWAL (OUTPATIENT)
Age: 70
End: 2020-06-18

## 2020-06-25 ENCOUNTER — APPOINTMENT (OUTPATIENT)
Dept: NEPHROLOGY | Facility: CLINIC | Age: 70
End: 2020-06-25
Payer: COMMERCIAL

## 2020-06-25 PROCEDURE — 99441: CPT

## 2020-07-23 ENCOUNTER — APPOINTMENT (OUTPATIENT)
Dept: ENDOCRINOLOGY | Facility: CLINIC | Age: 70
End: 2020-07-23
Payer: COMMERCIAL

## 2020-07-23 ENCOUNTER — APPOINTMENT (OUTPATIENT)
Dept: ULTRASOUND IMAGING | Facility: CLINIC | Age: 70
End: 2020-07-23
Payer: COMMERCIAL

## 2020-07-23 ENCOUNTER — APPOINTMENT (OUTPATIENT)
Dept: ELECTROPHYSIOLOGY | Facility: CLINIC | Age: 70
End: 2020-07-23
Payer: COMMERCIAL

## 2020-07-23 ENCOUNTER — OUTPATIENT (OUTPATIENT)
Dept: OUTPATIENT SERVICES | Facility: HOSPITAL | Age: 70
LOS: 1 days | End: 2020-07-23
Payer: COMMERCIAL

## 2020-07-23 VITALS
OXYGEN SATURATION: 96 % | HEIGHT: 65 IN | BODY MASS INDEX: 25.83 KG/M2 | DIASTOLIC BLOOD PRESSURE: 75 MMHG | SYSTOLIC BLOOD PRESSURE: 130 MMHG | WEIGHT: 155 LBS | HEART RATE: 60 BPM

## 2020-07-23 VITALS
WEIGHT: 155 LBS | SYSTOLIC BLOOD PRESSURE: 100 MMHG | BODY MASS INDEX: 25.83 KG/M2 | DIASTOLIC BLOOD PRESSURE: 60 MMHG | HEART RATE: 60 BPM | HEIGHT: 65 IN | OXYGEN SATURATION: 96 %

## 2020-07-23 DIAGNOSIS — E03.9 HYPOTHYROIDISM, UNSPECIFIED: ICD-10-CM

## 2020-07-23 LAB
24R-OH-CALCIDIOL SERPL-MCNC: 44 PG/ML
25(OH)D3 SERPL-MCNC: 35.2 NG/ML
ALBUMIN SERPL ELPH-MCNC: 4.1 G/DL
ALP BLD-CCNC: 37 U/L
ALT SERPL-CCNC: 17 U/L
ANION GAP SERPL CALC-SCNC: 14 MMOL/L
AST SERPL-CCNC: 27 U/L
BASOPHILS # BLD AUTO: 0.04 K/UL
BASOPHILS NFR BLD AUTO: 0.8 %
BILIRUB SERPL-MCNC: 0.3 MG/DL
BUN SERPL-MCNC: 31 MG/DL
CALCIUM SERPL-MCNC: 9.1 MG/DL
CALCIUM SERPL-MCNC: 9.1 MG/DL
CHLORIDE SERPL-SCNC: 106 MMOL/L
CO2 SERPL-SCNC: 21 MMOL/L
CREAT SERPL-MCNC: 1.88 MG/DL
EOSINOPHIL # BLD AUTO: 0.17 K/UL
EOSINOPHIL NFR BLD AUTO: 3.4 %
ESTRADIOL SERPL-MCNC: 27 PG/ML
FSH SERPL-MCNC: 29.2 IU/L
GLUCOSE BLDC GLUCOMTR-MCNC: 72
GLUCOSE SERPL-MCNC: 110 MG/DL
HCT VFR BLD CALC: 39.8 %
HGB BLD-MCNC: 12.6 G/DL
IMM GRANULOCYTES NFR BLD AUTO: 0.4 %
LH SERPL-ACNC: 22.4 IU/L
LYMPHOCYTES # BLD AUTO: 1.44 K/UL
LYMPHOCYTES NFR BLD AUTO: 29 %
MAGNESIUM SERPL-MCNC: 1.8 MG/DL
MAN DIFF?: NORMAL
MCHC RBC-ENTMCNC: 29.5 PG
MCHC RBC-ENTMCNC: 31.7 GM/DL
MCV RBC AUTO: 93.2 FL
MONOCYTES # BLD AUTO: 0.43 K/UL
MONOCYTES NFR BLD AUTO: 8.7 %
MONOMERIC PROLACTIN (ICMA)*: 11 NG/ML
NEUTROPHILS # BLD AUTO: 2.86 K/UL
NEUTROPHILS NFR BLD AUTO: 57.7 %
PARATHYROID HORMONE INTACT: 13 PG/ML
PERCENT MACROPROLACTIN: 63 %
PHOSPHATE SERPL-MCNC: 3 MG/DL
PLATELET # BLD AUTO: 176 K/UL
POTASSIUM SERPL-SCNC: 4.1 MMOL/L
PROLACTIN SERPL-MCNC: 21.2 NG/ML
PROLACTIN, SERUM (ICMA)*: 30 NG/ML
PROT SERPL-MCNC: 6.6 G/DL
RBC # BLD: 4.27 M/UL
RBC # FLD: 14 %
SHBG SERPL-SCNC: 76 NMOL/L
SODIUM SERPL-SCNC: 142 MMOL/L
T4 FREE SERPL-MCNC: 1.4 NG/DL
TESTOST BND SERPL-MCNC: 5.63 NG/DL
TESTOST SERPL-MCNC: 392 NG/DL
TESTOSTERONE BIOAVAILABLE: 34 NG/DL
TESTOSTERONE TOTAL S: 402 NG/DL
TSH SERPL-ACNC: 4.55 UIU/ML
WBC # FLD AUTO: 4.96 K/UL

## 2020-07-23 PROCEDURE — 76536 US EXAM OF HEAD AND NECK: CPT | Mod: 26

## 2020-07-23 PROCEDURE — 93280 PM DEVICE PROGR EVAL DUAL: CPT

## 2020-07-23 PROCEDURE — 76536 US EXAM OF HEAD AND NECK: CPT

## 2020-07-23 PROCEDURE — 99213 OFFICE O/P EST LOW 20 MIN: CPT

## 2020-07-23 RX ORDER — CHLORHEXIDINE GLUCONATE, 0.12% ORAL RINSE 1.2 MG/ML
0.12 SOLUTION DENTAL
Qty: 946 | Refills: 0 | Status: DISCONTINUED | COMMUNITY
Start: 2019-05-28 | End: 2020-07-23

## 2020-07-28 ENCOUNTER — TRANSCRIPTION ENCOUNTER (OUTPATIENT)
Age: 70
End: 2020-07-28

## 2020-08-29 LAB
T4 FREE SERPL-MCNC: 1.7 NG/DL
TSH SERPL-ACNC: 1.91 UIU/ML

## 2020-09-08 ENCOUNTER — APPOINTMENT (OUTPATIENT)
Dept: PHARMACY | Facility: CLINIC | Age: 70
End: 2020-09-08
Payer: SELF-PAY

## 2020-09-08 PROCEDURE — V5014D: CUSTOM

## 2020-09-11 ENCOUNTER — RX RENEWAL (OUTPATIENT)
Age: 70
End: 2020-09-11

## 2020-09-20 ENCOUNTER — FORM ENCOUNTER (OUTPATIENT)
Age: 70
End: 2020-09-20

## 2020-09-21 ENCOUNTER — APPOINTMENT (OUTPATIENT)
Dept: INFECTIOUS DISEASE | Facility: CLINIC | Age: 70
End: 2020-09-21
Payer: COMMERCIAL

## 2020-09-21 VITALS
BODY MASS INDEX: 25.83 KG/M2 | WEIGHT: 155 LBS | TEMPERATURE: 97.1 F | OXYGEN SATURATION: 99 % | HEIGHT: 65 IN | HEART RATE: 69 BPM | SYSTOLIC BLOOD PRESSURE: 125 MMHG | DIASTOLIC BLOOD PRESSURE: 76 MMHG

## 2020-09-21 DIAGNOSIS — Z23 ENCOUNTER FOR IMMUNIZATION: ICD-10-CM

## 2020-09-21 PROCEDURE — G0008: CPT

## 2020-09-21 PROCEDURE — 99214 OFFICE O/P EST MOD 30 MIN: CPT | Mod: 25

## 2020-09-21 PROCEDURE — 90662 IIV NO PRSV INCREASED AG IM: CPT

## 2020-09-21 PROCEDURE — 90715 TDAP VACCINE 7 YRS/> IM: CPT

## 2020-09-21 PROCEDURE — 90472 IMMUNIZATION ADMIN EACH ADD: CPT

## 2020-09-21 NOTE — ASSESSMENT
[FreeTextEntry1] : HIV, continue Norvir/Prezista d Tivicay\par Check Cd4 and VL\par Check renal function, including Cystain C GFR, CBC (had drop in Hgb)\par Check Uric acid\par Check COVID IgG- works front lines\par Tdap booster, grandchild on the way, High Dose flu shot

## 2020-09-21 NOTE — HISTORY OF PRESENT ILLNESS
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

## 2020-09-22 LAB
ALBUMIN SERPL ELPH-MCNC: 4.4 G/DL
ALP BLD-CCNC: 41 U/L
ALT SERPL-CCNC: 16 U/L
ANION GAP SERPL CALC-SCNC: 13 MMOL/L
APPEARANCE: CLEAR
AST SERPL-CCNC: 26 U/L
BACTERIA: NEGATIVE
BASOPHILS # BLD AUTO: 0.05 K/UL
BASOPHILS NFR BLD AUTO: 1.1 %
BILIRUB SERPL-MCNC: 0.4 MG/DL
BILIRUBIN URINE: NEGATIVE
BLOOD URINE: NEGATIVE
BUN SERPL-MCNC: 34 MG/DL
CALCIUM SERPL-MCNC: 9.7 MG/DL
CD3 CELLS # BLD: 919 /UL
CD3 CELLS NFR BLD: 65 %
CD3+CD4+ CELLS # BLD: 415 /UL
CD3+CD4+ CELLS NFR BLD: 29 %
CD3+CD4+ CELLS/CD3+CD8+ CLL SPEC: 0.86 RATIO
CD3+CD8+ CELLS # SPEC: 483 /UL
CD3+CD8+ CELLS NFR BLD: 34 %
CHLORIDE SERPL-SCNC: 101 MMOL/L
CO2 SERPL-SCNC: 24 MMOL/L
COLOR: NORMAL
CREAT SERPL-MCNC: 2.02 MG/DL
CREAT SPEC-SCNC: 64 MG/DL
CREAT/PROT UR: 0.2 RATIO
CYSTATIN C SERPL-MCNC: 1.57 MG/L
EOSINOPHIL # BLD AUTO: 0.15 K/UL
EOSINOPHIL NFR BLD AUTO: 3.3 %
FOLATE SERPL-MCNC: >20 NG/ML
GFR/BSA.PRED SERPLBLD CYS-BASED-ARV: 41 ML/MIN
GLUCOSE QUALITATIVE U: NEGATIVE
GLUCOSE SERPL-MCNC: 163 MG/DL
HCT VFR BLD CALC: 43.1 %
HGB BLD-MCNC: 14.1 G/DL
HIV1 RNA # SERPL NAA+PROBE: ABNORMAL
HIV1 RNA # SERPL NAA+PROBE: ABNORMAL COPIES/ML
HYALINE CASTS: 0 /LPF
IMM GRANULOCYTES NFR BLD AUTO: 0.2 %
IRON SATN MFR SERPL: 27 %
IRON SERPL-MCNC: 89 UG/DL
KETONES URINE: NEGATIVE
LEUKOCYTE ESTERASE URINE: NEGATIVE
LYMPHOCYTES # BLD AUTO: 1.53 K/UL
LYMPHOCYTES NFR BLD AUTO: 33.3 %
MAN DIFF?: NORMAL
MCHC RBC-ENTMCNC: 29.3 PG
MCHC RBC-ENTMCNC: 32.7 GM/DL
MCV RBC AUTO: 89.6 FL
MICROSCOPIC-UA: NORMAL
MONOCYTES # BLD AUTO: 0.29 K/UL
MONOCYTES NFR BLD AUTO: 6.3 %
NEUTROPHILS # BLD AUTO: 2.57 K/UL
NEUTROPHILS NFR BLD AUTO: 55.8 %
NITRITE URINE: NEGATIVE
PH URINE: 6.5
PLATELET # BLD AUTO: 209 K/UL
POTASSIUM SERPL-SCNC: 4.4 MMOL/L
PROT SERPL-MCNC: 7.2 G/DL
PROT UR-MCNC: 13 MG/DL
PROTEIN URINE: NORMAL
RBC # BLD: 4.81 M/UL
RBC # FLD: 13.1 %
RED BLOOD CELLS URINE: 1 /HPF
SARS-COV-2 IGG SERPL IA-ACNC: 0.08 INDEX
SARS-COV-2 IGG SERPL QL IA: NEGATIVE
SODIUM SERPL-SCNC: 138 MMOL/L
SPECIFIC GRAVITY URINE: 1.01
SQUAMOUS EPITHELIAL CELLS: 0 /HPF
TIBC SERPL-MCNC: 331 UG/DL
UIBC SERPL-MCNC: 242 UG/DL
URATE SERPL-MCNC: 5.1 MG/DL
UROBILINOGEN URINE: NORMAL
VIRAL LOAD INTERP: NORMAL
VIRAL LOAD LOG: ABNORMAL LG COP/ML
VIT B12 SERPL-MCNC: 1030 PG/ML
WBC # FLD AUTO: 4.6 K/UL
WHITE BLOOD CELLS URINE: 0 /HPF

## 2020-09-28 ENCOUNTER — APPOINTMENT (OUTPATIENT)
Dept: INFECTIOUS DISEASE | Facility: CLINIC | Age: 70
End: 2020-09-28
Payer: COMMERCIAL

## 2020-09-28 PROCEDURE — G0008: CPT

## 2020-09-28 PROCEDURE — 90662 IIV NO PRSV INCREASED AG IM: CPT

## 2020-10-22 ENCOUNTER — APPOINTMENT (OUTPATIENT)
Dept: ELECTROPHYSIOLOGY | Facility: CLINIC | Age: 70
End: 2020-10-22
Payer: COMMERCIAL

## 2020-10-22 PROCEDURE — 93296 REM INTERROG EVL PM/IDS: CPT

## 2020-10-22 PROCEDURE — 93294 REM INTERROG EVL PM/LDLS PM: CPT

## 2020-11-09 RX ORDER — LEVOTHYROXINE SODIUM 0.09 MG/1
88 TABLET ORAL DAILY
Qty: 30 | Refills: 3 | Status: ACTIVE | COMMUNITY
Start: 2019-06-26 | End: 1900-01-01

## 2020-12-03 ENCOUNTER — APPOINTMENT (OUTPATIENT)
Dept: ENDOCRINOLOGY | Facility: CLINIC | Age: 70
End: 2020-12-03
Payer: COMMERCIAL

## 2020-12-03 VITALS
TEMPERATURE: 97.3 F | HEIGHT: 65 IN | WEIGHT: 159 LBS | BODY MASS INDEX: 26.49 KG/M2 | DIASTOLIC BLOOD PRESSURE: 72 MMHG | OXYGEN SATURATION: 99 % | SYSTOLIC BLOOD PRESSURE: 126 MMHG | HEART RATE: 67 BPM

## 2020-12-03 PROCEDURE — 36415 COLL VENOUS BLD VENIPUNCTURE: CPT

## 2020-12-03 PROCEDURE — 99213 OFFICE O/P EST LOW 20 MIN: CPT | Mod: 25

## 2020-12-16 ENCOUNTER — RX RENEWAL (OUTPATIENT)
Age: 70
End: 2020-12-16

## 2020-12-21 LAB
24R-OH-CALCIDIOL SERPL-MCNC: 41.5 PG/ML
25(OH)D3 SERPL-MCNC: 40.4 NG/ML
ALBUMIN SERPL ELPH-MCNC: 4.1 G/DL
ALP BLD-CCNC: 50 U/L
ALT SERPL-CCNC: 14 U/L
ANION GAP SERPL CALC-SCNC: 11 MMOL/L
AST SERPL-CCNC: 22 U/L
BASOPHILS # BLD AUTO: 0.04 K/UL
BASOPHILS NFR BLD AUTO: 0.6 %
BILIRUB SERPL-MCNC: 0.2 MG/DL
BUN SERPL-MCNC: 42 MG/DL
CALCIUM SERPL-MCNC: 9.6 MG/DL
CALCIUM SERPL-MCNC: 9.6 MG/DL
CHLORIDE SERPL-SCNC: 106 MMOL/L
CO2 SERPL-SCNC: 24 MMOL/L
CREAT SERPL-MCNC: 1.98 MG/DL
EOSINOPHIL # BLD AUTO: 0.15 K/UL
EOSINOPHIL NFR BLD AUTO: 2.4 %
ESTIMATED AVERAGE GLUCOSE: 120 MG/DL
ESTRADIOL SERPL-MCNC: 22 PG/ML
FSH SERPL-MCNC: 27.5 IU/L
GLUCOSE SERPL-MCNC: 103 MG/DL
HBA1C MFR BLD HPLC: 5.8 %
HCT VFR BLD CALC: 44.8 %
HGB BLD-MCNC: 14.3 G/DL
IMM GRANULOCYTES NFR BLD AUTO: 0.3 %
LH SERPL-ACNC: 24.2 IU/L
LYMPHOCYTES # BLD AUTO: 1.58 K/UL
LYMPHOCYTES NFR BLD AUTO: 25.2 %
MAGNESIUM SERPL-MCNC: 2 MG/DL
MAN DIFF?: NORMAL
MCHC RBC-ENTMCNC: 29.5 PG
MCHC RBC-ENTMCNC: 31.9 GM/DL
MCV RBC AUTO: 92.4 FL
MONOCYTES # BLD AUTO: 0.58 K/UL
MONOCYTES NFR BLD AUTO: 9.2 %
NEUTROPHILS # BLD AUTO: 3.91 K/UL
NEUTROPHILS NFR BLD AUTO: 62.3 %
PARATHYROID HORMONE INTACT: 12 PG/ML
PHOSPHATE SERPL-MCNC: 3 MG/DL
PLATELET # BLD AUTO: 192 K/UL
POTASSIUM SERPL-SCNC: 4.3 MMOL/L
PROLACTIN SERPL-MCNC: 19.6 NG/ML
PROT SERPL-MCNC: 7 G/DL
RBC # BLD: 4.85 M/UL
RBC # FLD: 13.5 %
SHBG SERPL-SCNC: 73 NMOL/L
SODIUM SERPL-SCNC: 142 MMOL/L
T4 FREE SERPL-MCNC: 1.4 NG/DL
TESTOST BND SERPL-MCNC: 8.69 NG/DL
TESTOST SERPL-MCNC: 395 NG/DL
TESTOSTERONE BIOAVAILABLE: 53 NG/DL
TESTOSTERONE TOTAL S: 483 NG/DL
TSH SERPL-ACNC: 2.27 UIU/ML
WBC # FLD AUTO: 6.28 K/UL

## 2020-12-23 ENCOUNTER — NON-APPOINTMENT (OUTPATIENT)
Age: 70
End: 2020-12-23

## 2020-12-23 LAB
MONOMERIC PROLACTIN (ICMA)*: 11 NG/ML
PERCENT MACROPROLACTIN: 69 %
PROLACTIN, SERUM (ICMA)*: 35 NG/ML

## 2021-01-19 ENCOUNTER — LABORATORY RESULT (OUTPATIENT)
Age: 71
End: 2021-01-19

## 2021-01-21 ENCOUNTER — APPOINTMENT (OUTPATIENT)
Dept: ELECTROPHYSIOLOGY | Facility: CLINIC | Age: 71
End: 2021-01-21
Payer: COMMERCIAL

## 2021-01-21 PROCEDURE — 93296 REM INTERROG EVL PM/IDS: CPT

## 2021-01-21 PROCEDURE — 93294 REM INTERROG EVL PM/LDLS PM: CPT

## 2021-02-25 ENCOUNTER — APPOINTMENT (OUTPATIENT)
Dept: PHARMACY | Facility: CLINIC | Age: 71
End: 2021-02-25
Payer: SELF-PAY

## 2021-02-25 PROCEDURE — V5014C: CUSTOM | Mod: RT

## 2021-03-16 ENCOUNTER — RX RENEWAL (OUTPATIENT)
Age: 71
End: 2021-03-16

## 2021-03-18 ENCOUNTER — RX RENEWAL (OUTPATIENT)
Age: 71
End: 2021-03-18

## 2021-03-21 ENCOUNTER — FORM ENCOUNTER (OUTPATIENT)
Age: 71
End: 2021-03-21

## 2021-03-22 ENCOUNTER — APPOINTMENT (OUTPATIENT)
Dept: INFECTIOUS DISEASE | Facility: CLINIC | Age: 71
End: 2021-03-22
Payer: COMMERCIAL

## 2021-03-22 VITALS
HEIGHT: 65 IN | TEMPERATURE: 98.5 F | HEART RATE: 70 BPM | BODY MASS INDEX: 25.99 KG/M2 | OXYGEN SATURATION: 98 % | WEIGHT: 156 LBS | SYSTOLIC BLOOD PRESSURE: 100 MMHG | DIASTOLIC BLOOD PRESSURE: 62 MMHG

## 2021-03-22 PROCEDURE — 99213 OFFICE O/P EST LOW 20 MIN: CPT

## 2021-03-22 PROCEDURE — 99072 ADDL SUPL MATRL&STAF TM PHE: CPT

## 2021-03-22 RX ORDER — POLYETHYLENE GLYCOL 3350, SODIUM CHLORIDE, SODIUM BICARBONATE AND POTASSIUM CHLORIDE WITH LEMON FLAVOR 420; 11.2; 5.72; 1.48 G/4L; G/4L; G/4L; G/4L
420 POWDER, FOR SOLUTION ORAL
Qty: 4000 | Refills: 0 | Status: COMPLETED | COMMUNITY
Start: 2021-01-24

## 2021-03-22 NOTE — HISTORY OF PRESENT ILLNESS
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

## 2021-03-22 NOTE — ASSESSMENT
[FreeTextEntry1] : HIV doing well on Norvir/Prezista and Tivicay\par Check annual labs:\par CD4, VL, CBC, SMA, \par CKD, check SMA and Cystatin C GFR on tivicay\par Non-fasted, will get lipids separately\par Check Vitamin D, QTF\par Vaccines, UTD including COVID vaccine- Pfizer X 2\par BPH- followed by , check PSA\par Borderline DM, has cut back on rice, if still elevated can work with dietician, wife does the cooking\par Hypothyroidism: followed by endocrinology (high FSH/LH)\par On coumadin check INR, Afib/cardiomyopathy with AICD follows with cardiology had device checked 2 weeks ago, has more years on the battery\par Check, on urolic check Uric acid\par Colon Ca screening: colonoscopy 2017, F/U 5 years\par Dental: last in November 2020, no issues

## 2021-03-22 NOTE — PHYSICAL EXAM
[General Appearance - Alert] : alert [General Appearance - In No Acute Distress] : in no acute distress [Sclera] : the sclera and conjunctiva were normal [PERRL With Normal Accommodation] : pupils were equal in size, round, reactive to light [Extraocular Movements] : extraocular movements were intact [Outer Ear] : the ears and nose were normal in appearance [Oropharynx] : the oropharynx was normal with no thrush [Neck Appearance] : the appearance of the neck was normal [Neck Cervical Mass (___cm)] : no neck mass was observed [Jugular Venous Distention Increased] : there was no jugular-venous distention [Thyroid Diffuse Enlargement] : the thyroid was not enlarged [Auscultation Breath Sounds / Voice Sounds] : lungs were clear to auscultation bilaterally [Heart Rate And Rhythm] : heart rate was normal and rhythm regular [Heart Sounds] : normal S1 and S2 [Heart Sounds Gallop] : no gallops [Murmurs] : no murmurs [Heart Sounds Pericardial Friction Rub] : no pericardial rub [FreeTextEntry1] : AICD left chest [Full Pulse] : the pedal pulses are present [Edema] : there was no peripheral edema [Bowel Sounds] : normal bowel sounds [Abdomen Soft] : soft [Abdomen Tenderness] : non-tender [Abdomen Mass (___ Cm)] : no abdominal mass palpated [Costovertebral Angle Tenderness] : no CVA tenderness [No Palpable Adenopathy] : no palpable adenopathy [Musculoskeletal - Swelling] : no joint swelling [Nail Clubbing] : no clubbing  or cyanosis of the fingernails [Motor Tone] : muscle strength and tone were normal [Skin Color & Pigmentation] : normal skin color and pigmentation [] : no rash [Deep Tendon Reflexes (DTR)] : deep tendon reflexes were 2+ and symmetric [Sensation] : the sensory exam was normal to light touch and pinprick [No Focal Deficits] : no focal deficits [Oriented To Time, Place, And Person] : oriented to person, place, and time [Affect] : the affect was normal

## 2021-04-05 ENCOUNTER — NON-APPOINTMENT (OUTPATIENT)
Age: 71
End: 2021-04-05

## 2021-04-05 LAB
25(OH)D3 SERPL-MCNC: 43.1 NG/ML
ALBUMIN SERPL ELPH-MCNC: 4.2 G/DL
ALP BLD-CCNC: 35 U/L
ALT SERPL-CCNC: 14 U/L
ANION GAP SERPL CALC-SCNC: 11 MMOL/L
APPEARANCE: CLEAR
AST SERPL-CCNC: 22 U/L
BACTERIA: NEGATIVE
BASOPHILS # BLD AUTO: 0.05 K/UL
BASOPHILS NFR BLD AUTO: 1 %
BILIRUB SERPL-MCNC: 0.4 MG/DL
BILIRUBIN URINE: NEGATIVE
BLOOD URINE: NEGATIVE
BUN SERPL-MCNC: 30 MG/DL
CALCIUM SERPL-MCNC: 9.2 MG/DL
CD3 CELLS # BLD: 1001 /UL
CD3 CELLS NFR BLD: 67 %
CD3+CD4+ CELLS # BLD: 446 /UL
CD3+CD4+ CELLS NFR BLD: 30 %
CD3+CD4+ CELLS/CD3+CD8+ CLL SPEC: 0.84 RATIO
CD3+CD8+ CELLS # SPEC: 529 /UL
CD3+CD8+ CELLS NFR BLD: 35 %
CHLORIDE SERPL-SCNC: 107 MMOL/L
CHOLEST SERPL-MCNC: 170 MG/DL
CO2 SERPL-SCNC: 25 MMOL/L
COLOR: NORMAL
CREAT SERPL-MCNC: 1.66 MG/DL
CYSTATIN C SERPL-MCNC: 1.59 MG/L
EOSINOPHIL # BLD AUTO: 0.24 K/UL
EOSINOPHIL NFR BLD AUTO: 4.9 %
ESTIMATED AVERAGE GLUCOSE: 120 MG/DL
GFR/BSA.PRED SERPLBLD CYS-BASED-ARV: 40 ML/MIN
GLUCOSE QUALITATIVE U: NEGATIVE
GLUCOSE SERPL-MCNC: 96 MG/DL
HBA1C MFR BLD HPLC: 5.8 %
HCT VFR BLD CALC: 43.8 %
HCV AB SER QL: NONREACTIVE
HCV S/CO RATIO: 0.1 S/CO
HDLC SERPL-MCNC: 41 MG/DL
HGB BLD-MCNC: 14 G/DL
HYALINE CASTS: 0 /LPF
IMM GRANULOCYTES NFR BLD AUTO: 0 %
INR PPP: 2.88 RATIO
KETONES URINE: NEGATIVE
LDLC SERPL CALC-MCNC: 90 MG/DL
LEUKOCYTE ESTERASE URINE: NEGATIVE
LYMPHOCYTES # BLD AUTO: 1.61 K/UL
LYMPHOCYTES NFR BLD AUTO: 33.1 %
M TB IFN-G BLD-IMP: NEGATIVE
MAN DIFF?: NORMAL
MCHC RBC-ENTMCNC: 29.6 PG
MCHC RBC-ENTMCNC: 32 GM/DL
MCV RBC AUTO: 92.6 FL
MICROSCOPIC-UA: NORMAL
MONOCYTES # BLD AUTO: 0.4 K/UL
MONOCYTES NFR BLD AUTO: 8.2 %
NEUTROPHILS # BLD AUTO: 2.57 K/UL
NEUTROPHILS NFR BLD AUTO: 52.8 %
NITRITE URINE: NEGATIVE
NONHDLC SERPL-MCNC: 129 MG/DL
PH URINE: 6
PLATELET # BLD AUTO: 178 K/UL
POTASSIUM SERPL-SCNC: 4.3 MMOL/L
PROT SERPL-MCNC: 6.9 G/DL
PROTEIN URINE: NORMAL
PSA SERPL-MCNC: 0.57 NG/ML
PT BLD: 32.6 SEC
QUANTIFERON TB PLUS MITOGEN MINUS NIL: 8.34 IU/ML
QUANTIFERON TB PLUS NIL: 0.08 IU/ML
QUANTIFERON TB PLUS TB1 MINUS NIL: 0 IU/ML
QUANTIFERON TB PLUS TB2 MINUS NIL: 0.01 IU/ML
RBC # BLD: 4.73 M/UL
RBC # FLD: 13.2 %
RED BLOOD CELLS URINE: 1 /HPF
SODIUM SERPL-SCNC: 143 MMOL/L
SPECIFIC GRAVITY URINE: 1.02
SQUAMOUS EPITHELIAL CELLS: 0 /HPF
T PALLIDUM AB SER QL IA: NEGATIVE
TRIGL SERPL-MCNC: 191 MG/DL
URATE SERPL-MCNC: 6.1 MG/DL
UROBILINOGEN URINE: NORMAL
WBC # FLD AUTO: 4.87 K/UL
WHITE BLOOD CELLS URINE: 1 /HPF

## 2021-04-06 LAB
HIV1 RNA # SERPL NAA+PROBE: ABNORMAL
HIV1 RNA # SERPL NAA+PROBE: ABNORMAL COPIES/ML
VIRAL LOAD INTERP: NORMAL
VIRAL LOAD LOG: ABNORMAL LG COP/ML

## 2021-05-03 NOTE — ED ADULT TRIAGE NOTE - ARRIVAL FROM
"Group Therapy Progress Notes     Client Initial Individualized Goals for Treatment:   Will improve wellness related behaviors by reporting in group the skills you have built confidence in using to help symptoms and Will develop an aftercare / transition plan by the end of the program    Area of Treatment Focus:  Community Resources / Support and Discharge Planning    Therapeutic Interventions/Treatment Strategies:  Assist with discharge planning  Engage in safety planning when indicated  Facilitate increased self awareness  Provide education regarding community based services  Teach adaptive coping skills and communication skills    Response to Treatment Strategies:  Gave Feedback, Listened , Focused on Goals, Attentive and Accepted Support    Name of Groups:  Coping Skills - Time: 10-10:50    Description and Therapeutic Outcome:   During group, Coping Skills were reviewed.  Ayesha identified which skills she is currently using which include: Being Grateful, Being Metric, and Awareness, Acceptance and Action.  She reports using these skills is something new for her and has been beneficial especially  Being Metric by Just Doing It has allowed her to be \"spending more time with others versus cleaning the house or doing laundry which will always be there\".  She identified an areas she wishes to improve on is becoming more Present in the Moment to allow for more time being given to her young daughter.  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  She reports Magali from [facility] will be able to assist her.Ayesha provides helpful information to others during group and is intentional regarding her wellness, she continues to be appropriate in continuing PHP.        Is this a Weekly Review of the Progress on the Treatment Plan?  YES    Are " Treatment Plan Goals being addressed?  YES      Are Treatment Plan Strategies to Address Goals Effective?  YES      Are there any current contracts in place?  YES              Home

## 2021-05-13 ENCOUNTER — RX RENEWAL (OUTPATIENT)
Age: 71
End: 2021-05-13

## 2021-06-13 ENCOUNTER — RX RENEWAL (OUTPATIENT)
Age: 71
End: 2021-06-13

## 2021-06-14 ENCOUNTER — RX RENEWAL (OUTPATIENT)
Age: 71
End: 2021-06-14

## 2021-06-14 ENCOUNTER — TRANSCRIPTION ENCOUNTER (OUTPATIENT)
Age: 71
End: 2021-06-14

## 2021-06-21 ENCOUNTER — TRANSCRIPTION ENCOUNTER (OUTPATIENT)
Age: 71
End: 2021-06-21

## 2021-06-24 ENCOUNTER — APPOINTMENT (OUTPATIENT)
Dept: NEPHROLOGY | Facility: CLINIC | Age: 71
End: 2021-06-24
Payer: COMMERCIAL

## 2021-06-24 VITALS
SYSTOLIC BLOOD PRESSURE: 151 MMHG | WEIGHT: 156.53 LBS | TEMPERATURE: 97.7 F | HEART RATE: 60 BPM | HEIGHT: 65 IN | BODY MASS INDEX: 26.08 KG/M2 | DIASTOLIC BLOOD PRESSURE: 78 MMHG | OXYGEN SATURATION: 99 %

## 2021-06-24 PROCEDURE — 99072 ADDL SUPL MATRL&STAF TM PHE: CPT

## 2021-06-24 PROCEDURE — 99213 OFFICE O/P EST LOW 20 MIN: CPT

## 2021-06-24 NOTE — PHYSICAL EXAM
[General Appearance - Alert] : alert [General Appearance - In No Acute Distress] : in no acute distress [General Appearance - Well Nourished] : well nourished [Neck Cervical Mass (___cm)] : no neck mass was observed [] : no respiratory distress [Respiration, Rhythm And Depth] : normal respiratory rhythm and effort [Exaggerated Use Of Accessory Muscles For Inspiration] : no accessory muscle use [Auscultation Breath Sounds / Voice Sounds] : lungs were clear to auscultation bilaterally [Heart Sounds] : normal S1 and S2 [Heart Sounds Gallop] : no gallops [Murmurs] : no murmurs [Heart Sounds Pericardial Friction Rub] : no pericardial rub [Edema] : there was no peripheral edema [Involuntary Movements] : no involuntary movements were seen [Oriented To Time, Place, And Person] : oriented to person, place, and time [Impaired Insight] : insight and judgment were intact [Affect] : the affect was normal

## 2021-06-24 NOTE — ASSESSMENT
[FreeTextEntry1] : 72 y/o male with HIV, CKD( Baseline creatinine 1.5-2 mg/dl)  here for evaluation:\par \par CKD:( Baseline creatinine 1.5-2 mg/dl) \par Recently his serum creatinine had been ranging in the 1.5-1.7 mg/dl range, although it had been in the 2  range in 2013/2014.  He is now on Doultegavir which can inhibit the tubular creatinine secretion by blocking the organic cation transporter located at the basolateral side of proximal tubular cell. \par \par  renal function overall stable\par Urine is blood with no blood and trace protein. Historically with no proteinuria\par BP perfect and euvolemic on exam \par  Renal sonogram done in 5/18: simple left renal cyst. Will repeat end of this year\par Maintain on Vit D supplements \par  \par HIV: management per ID\par F/U in 6 month\par \par (c) [phone]

## 2021-06-24 NOTE — HISTORY OF PRESENT ILLNESS
[FreeTextEntry1] : Mr Watkins is a 72 y/o male with H/O  HIV ( > 20 years)  on tivicay instead of  descovy, which was  changed in dec 2018,  CKD (10 years, with baseline creatinine of 1.6- 2 mg/dl ) Tooth aspiration s/p bronchoscopy for removal ( Jan 2019: creat on admission 1.7 and 2.1 mg/dl on discharge) comes in for a follow up visit.  \par \par Feels OK.  No interval infections or hospitalizations. Covid vaccinated \par \par  \par  \par  \par \par

## 2021-07-08 ENCOUNTER — APPOINTMENT (OUTPATIENT)
Dept: ELECTROPHYSIOLOGY | Facility: CLINIC | Age: 71
End: 2021-07-08

## 2021-07-22 ENCOUNTER — APPOINTMENT (OUTPATIENT)
Dept: ELECTROPHYSIOLOGY | Facility: CLINIC | Age: 71
End: 2021-07-22

## 2021-07-26 ENCOUNTER — NON-APPOINTMENT (OUTPATIENT)
Age: 71
End: 2021-07-26

## 2021-07-26 LAB
CAU: 4 MG/DL
CREAT 24H UR-MCNC: 1.6 G/24 H
CREAT ?TM UR-MCNC: 79 MG/DL
PHOSPHATE/CREAT 24H UR-SRTO: 1.2 G/24 H
PROT ?TM UR-MCNC: 24 HR
SPECIMEN VOL 24H UR: 2000 ML
SPECIMEN VOL 24H UR: 80 MG/24 H
U PHOS: 59.5 MG/DL

## 2021-07-26 NOTE — ASSESSMENT
[FreeTextEntry1] : 69 yo M with PMH HIV, PAF post ablation X 2, post PPM, h/o of cholangitis, Fhx of FAP, HTN, HLD, drug induced pancreatitis, CKD, CAD, prediabetes, elevated gonadotropins, subclinical hypothyroidism, macroprolactinemia. , low iPTH\par \par 1. Prediabetes - . Lifestyle modification advised. \par \par 2. Elevated gonadotropins -  testosterone values returned to normal. Elevated gonadotropins Attributed to HIV disease.  CT head as above (unable to have MRI due to PPM - without contrast due to renal disease) Small testicular size on scrotal US \par \par 3. Subclinical hypothyroidism - increased to 88 mcg qd\par \par 4. Thyroid nodules - . Subcentimeter nodules. continue to monitor\par \par 5. Macroprolactinemia - non functioning with normal testosterone value\par \par 6. 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

## 2021-07-26 NOTE — HISTORY OF PRESENT ILLNESS
[FreeTextEntry1] : 69 yo M with PMH HIV, PAF post ablation X 2, post PPM, h/o of cholangitis, Fhx of FAP, HTN, HLD, drug induced pancreatitis, CKD, CAD, prediabetes, elevated gonadotropins, subclinical hypothyroidism, macroprolactinemia. \par \par Originally presented for management of prediabetes\par He was diagnosed with prediabetes 8/28/15\par He was then found to have elevated gonadotropins with FSH to 25.5 and LH to 21.5 with normal testosterone values.\par 3/8/19 scrotal US showed small testicular size and 7 mm right epididymal cyst.\par Also found to have subclinical hypothyroidism - confirmed, with TSH of 6.75 which normalized and upon repeat was elevated to 7.81\par started on LT4 after last visit - now titrated to 75 mcg qd 6/26/19\par previously on amiodarone for 3 months (5 years ago)\par Also diagnosed with macroprolactinemia \par subcentimeter spongiform thyroid nodule and small cysts found of thyroid US 3/14/19 . ct C- from the same day revealed normal pituitary.\par repeat thyroid US showed B/L subcentimeter thyroid nodules on thyroid US 7/23/20\par also found to have a a low iPTH. no personal or fhx of calcium/phos/mag disease. . normalized on repeat testing. 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 \par repeat testosterone normal\par

## 2021-08-11 ENCOUNTER — RESULT CHARGE (OUTPATIENT)
Age: 71
End: 2021-08-11

## 2021-08-12 ENCOUNTER — APPOINTMENT (OUTPATIENT)
Dept: ELECTROPHYSIOLOGY | Facility: CLINIC | Age: 71
End: 2021-08-12
Payer: COMMERCIAL

## 2021-08-12 ENCOUNTER — NON-APPOINTMENT (OUTPATIENT)
Age: 71
End: 2021-08-12

## 2021-08-12 ENCOUNTER — APPOINTMENT (OUTPATIENT)
Dept: CARDIOLOGY | Facility: CLINIC | Age: 71
End: 2021-08-12
Payer: COMMERCIAL

## 2021-08-12 VITALS
SYSTOLIC BLOOD PRESSURE: 151 MMHG | HEIGHT: 65 IN | OXYGEN SATURATION: 98 % | DIASTOLIC BLOOD PRESSURE: 86 MMHG | HEART RATE: 61 BPM

## 2021-08-12 PROCEDURE — 99214 OFFICE O/P EST MOD 30 MIN: CPT

## 2021-08-12 PROCEDURE — 93280 PM DEVICE PROGR EVAL DUAL: CPT

## 2021-08-12 PROCEDURE — 93000 ELECTROCARDIOGRAM COMPLETE: CPT

## 2021-08-12 NOTE — HISTORY OF PRESENT ILLNESS
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 No palpitations - however device reveals short episodes of AF with RVR\par No bleeding on Coumadin\par \par

## 2021-08-12 NOTE — REASON FOR VISIT
[Arrhythmia/ECG Abnorrmalities] : arrhythmia/ECG abnormalities [Hyperlipidemia] : hyperlipidemia [Hypertension] : hypertension [Coronary Artery Disease] : coronary artery disease

## 2021-09-06 ENCOUNTER — RX RENEWAL (OUTPATIENT)
Age: 71
End: 2021-09-06

## 2021-09-16 ENCOUNTER — RX RENEWAL (OUTPATIENT)
Age: 71
End: 2021-09-16

## 2021-09-22 ENCOUNTER — APPOINTMENT (OUTPATIENT)
Dept: PHARMACY | Facility: CLINIC | Age: 71
End: 2021-09-22
Payer: SELF-PAY

## 2021-09-22 PROCEDURE — V5014A: CUSTOM | Mod: RT

## 2021-09-23 ENCOUNTER — APPOINTMENT (OUTPATIENT)
Dept: ENDOCRINOLOGY | Facility: CLINIC | Age: 71
End: 2021-09-23

## 2021-09-26 ENCOUNTER — FORM ENCOUNTER (OUTPATIENT)
Age: 71
End: 2021-09-26

## 2021-09-27 ENCOUNTER — APPOINTMENT (OUTPATIENT)
Dept: INFECTIOUS DISEASE | Facility: CLINIC | Age: 71
End: 2021-09-27
Payer: COMMERCIAL

## 2021-09-27 VITALS
HEIGHT: 65 IN | SYSTOLIC BLOOD PRESSURE: 132 MMHG | TEMPERATURE: 98.7 F | BODY MASS INDEX: 25.99 KG/M2 | OXYGEN SATURATION: 98 % | HEART RATE: 60 BPM | WEIGHT: 156 LBS | DIASTOLIC BLOOD PRESSURE: 70 MMHG

## 2021-09-27 PROCEDURE — G0008: CPT

## 2021-09-27 PROCEDURE — 90662 IIV NO PRSV INCREASED AG IM: CPT

## 2021-09-27 PROCEDURE — 99214 OFFICE O/P EST MOD 30 MIN: CPT | Mod: 25

## 2021-09-27 RX ORDER — AMOXICILLIN AND CLAVULANATE POTASSIUM 875; 125 MG/1; MG/1
875-125 TABLET, COATED ORAL
Qty: 20 | Refills: 0 | Status: DISCONTINUED | COMMUNITY
Start: 2021-09-19

## 2021-09-27 NOTE — PHYSICAL EXAM
[General Appearance - Alert] : alert [General Appearance - In No Acute Distress] : in no acute distress [PERRL With Normal Accommodation] : pupils were equal in size, round, reactive to light [Sclera] : the sclera and conjunctiva were normal [Extraocular Movements] : extraocular movements were intact [Outer Ear] : the ears and nose were normal in appearance [Oropharynx] : the oropharynx was normal with no thrush [Neck Appearance] : the appearance of the neck was normal [Neck Cervical Mass (___cm)] : no neck mass was observed [Jugular Venous Distention Increased] : there was no jugular-venous distention [Thyroid Diffuse Enlargement] : the thyroid was not enlarged [Auscultation Breath Sounds / Voice Sounds] : lungs were clear to auscultation bilaterally [Heart Rate And Rhythm] : heart rate was normal and rhythm regular [Heart Sounds] : normal S1 and S2 [Heart Sounds Gallop] : no gallops [Murmurs] : no murmurs [Heart Sounds Pericardial Friction Rub] : no pericardial rub [Full Pulse] : the pedal pulses are present [Edema] : there was no peripheral edema [Bowel Sounds] : normal bowel sounds [Abdomen Soft] : soft [Abdomen Tenderness] : non-tender [Abdomen Mass (___ Cm)] : no abdominal mass palpated [Costovertebral Angle Tenderness] : no CVA tenderness [No Palpable Adenopathy] : no palpable adenopathy [Musculoskeletal - Swelling] : no joint swelling [Nail Clubbing] : no clubbing  or cyanosis of the fingernails [Motor Tone] : muscle strength and tone were normal [FreeTextEntry1] : Tophi in fingers [Skin Color & Pigmentation] : normal skin color and pigmentation [] : no rash [Deep Tendon Reflexes (DTR)] : deep tendon reflexes were 2+ and symmetric [Sensation] : the sensory exam was normal to light touch and pinprick [No Focal Deficits] : no focal deficits [Oriented To Time, Place, And Person] : oriented to person, place, and time [Affect] : the affect was normal

## 2021-09-27 NOTE — HISTORY OF PRESENT ILLNESS
[FreeTextEntry1] : HIV on daily Norvir/Prezista and Tivicay\par Tolerating well\par Takes daily in the evening after diiner\par No missed doses this week\par \par Has been well\par Saw cardiology- will need PM battery in 1 year\par  [Sexually Active] : The patient is not sexually active

## 2021-09-27 NOTE — ASSESSMENT
[FreeTextEntry1] : HIV suppressed on Norvir/Prezista and Tivicay daily\par Continue current meds\par Vaccines, completed 2 Pfizer COVID shots, due for Booster, will give flu shot high dose today, pneumovax next year\par HTN- controlled ses cardiology for PM battery change next year\par Nephrology: CKD stable, for U/S later this year, will use Cystatin C GFR on DTG\par Check CD4, VL, CBC and SMA\par Check INR on coumadin, he self adjusts\par Check uric acid- H/O goat\par Check HgA1c\par Colonoscopy Jan 2017, polyps- F/U early next year- Dr Velez\par

## 2021-09-28 LAB
ALBUMIN SERPL ELPH-MCNC: 4 G/DL
ALP BLD-CCNC: 43 U/L
ALT SERPL-CCNC: 15 U/L
ANION GAP SERPL CALC-SCNC: 13 MMOL/L
AST SERPL-CCNC: 26 U/L
BASOPHILS # BLD AUTO: 0.04 K/UL
BASOPHILS NFR BLD AUTO: 0.6 %
BILIRUB SERPL-MCNC: 0.3 MG/DL
BUN SERPL-MCNC: 36 MG/DL
CALCIUM SERPL-MCNC: 9.3 MG/DL
CD3 CELLS # BLD: 1054 /UL
CD3 CELLS NFR BLD: 61 %
CD3+CD4+ CELLS # BLD: 527 /UL
CD3+CD4+ CELLS NFR BLD: 30 %
CD3+CD4+ CELLS/CD3+CD8+ CLL SPEC: 1.07 RATIO
CD3+CD8+ CELLS # SPEC: 493 /UL
CD3+CD8+ CELLS NFR BLD: 29 %
CHLORIDE SERPL-SCNC: 106 MMOL/L
CO2 SERPL-SCNC: 21 MMOL/L
CREAT SERPL-MCNC: 1.68 MG/DL
CYSTATIN C SERPL-MCNC: 1.43 MG/L
EOSINOPHIL # BLD AUTO: 0.15 K/UL
EOSINOPHIL NFR BLD AUTO: 2.4 %
ESTIMATED AVERAGE GLUCOSE: 120 MG/DL
GFR/BSA.PRED SERPLBLD CYS-BASED-ARV: 46 ML/MIN
GLUCOSE SERPL-MCNC: 114 MG/DL
HBA1C MFR BLD HPLC: 5.8 %
HCT VFR BLD CALC: 40.8 %
HGB BLD-MCNC: 13.1 G/DL
HIV1 RNA # SERPL NAA+PROBE: NORMAL
HIV1 RNA # SERPL NAA+PROBE: NORMAL COPIES/ML
IMM GRANULOCYTES NFR BLD AUTO: 0.3 %
INR PPP: 3.82 RATIO
LYMPHOCYTES # BLD AUTO: 1.79 K/UL
LYMPHOCYTES NFR BLD AUTO: 28.1 %
MAN DIFF?: NORMAL
MCHC RBC-ENTMCNC: 29.4 PG
MCHC RBC-ENTMCNC: 32.1 GM/DL
MCV RBC AUTO: 91.7 FL
MONOCYTES # BLD AUTO: 0.59 K/UL
MONOCYTES NFR BLD AUTO: 9.3 %
NEUTROPHILS # BLD AUTO: 3.77 K/UL
NEUTROPHILS NFR BLD AUTO: 59.3 %
PLATELET # BLD AUTO: 206 K/UL
POTASSIUM SERPL-SCNC: 4.5 MMOL/L
PROT SERPL-MCNC: 6.4 G/DL
PT BLD: 42.6 SEC
RBC # BLD: 4.45 M/UL
RBC # FLD: 13.2 %
SODIUM SERPL-SCNC: 141 MMOL/L
URATE SERPL-MCNC: 4.1 MG/DL
VIRAL LOAD INTERP: NORMAL
VIRAL LOAD LOG: NORMAL LG COP/ML
WBC # FLD AUTO: 6.36 K/UL

## 2021-09-29 ENCOUNTER — NON-APPOINTMENT (OUTPATIENT)
Age: 71
End: 2021-09-29

## 2021-10-03 ENCOUNTER — NON-APPOINTMENT (OUTPATIENT)
Age: 71
End: 2021-10-03

## 2021-10-07 ENCOUNTER — APPOINTMENT (OUTPATIENT)
Dept: INFECTIOUS DISEASE | Facility: CLINIC | Age: 71
End: 2021-10-07

## 2021-10-07 ENCOUNTER — LABORATORY RESULT (OUTPATIENT)
Age: 71
End: 2021-10-07

## 2021-11-30 ENCOUNTER — APPOINTMENT (OUTPATIENT)
Dept: ELECTROPHYSIOLOGY | Facility: CLINIC | Age: 71
End: 2021-11-30

## 2021-12-01 ENCOUNTER — RX RENEWAL (OUTPATIENT)
Age: 71
End: 2021-12-01

## 2021-12-08 ENCOUNTER — RX RENEWAL (OUTPATIENT)
Age: 71
End: 2021-12-08

## 2021-12-20 ENCOUNTER — TRANSCRIPTION ENCOUNTER (OUTPATIENT)
Age: 71
End: 2021-12-20

## 2021-12-21 ENCOUNTER — APPOINTMENT (OUTPATIENT)
Dept: ORTHOPEDIC SURGERY | Facility: CLINIC | Age: 71
End: 2021-12-21
Payer: COMMERCIAL

## 2021-12-21 DIAGNOSIS — S46.111A STRAIN OF MUSCLE, FASCIA AND TENDON OF LONG HEAD OF BICEPS, RIGHT ARM, INITIAL ENCOUNTER: ICD-10-CM

## 2021-12-21 PROCEDURE — 99203 OFFICE O/P NEW LOW 30 MIN: CPT

## 2021-12-21 NOTE — DISCUSSION/SUMMARY
[de-identified] : Right shoulder long head of biceps rupture he has excellent rotator cuff strength no concerns rotator cuff tearing we discussed the condition at length recommend a period of rest and physical therapy exercises.  He may follow-up as needed.  If not improved consider CT arthrogram given his pacemaker.  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

## 2021-12-21 NOTE — HISTORY OF PRESENT ILLNESS
[de-identified] : 71-year-old male employee of [employer] with a chief complaint of right shoulder pain and deformity.  He was golfing when he missed hit a ball in the bunker and felt a pop in his arm he noticed a deformity this was 1 week ago his pain has been improving.  He denies any antecedent shoulder pain he denies any numbness or tingling\par \par The patient's past medical history, past surgical history, medications, allergies, and social history were reviewed by me today with the patient and documented accordingly. In addition, the patient's family history, which is noncontributory to this visit, was also reviewed.\par

## 2021-12-21 NOTE — PHYSICAL EXAM
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 No tenderness SC joint, clavicle, AC joint.\par ROM: 160/60/T6\par Impingement tests: Positive Boyd\par AC Joint: no pain with cross arm testing\par Biceps: Negative speed\par Strength: 5/5 abduction, external rotation, and internal rotation\par Neuro: AIN, PIN, Ulnar nerve motor intact\par Sensation: Intact to light touch in radial, median, ulnar, and axillary nerve distributions\par Vasc: 2+ radial pulse\par  [de-identified] : Radiographs obtained outside were reviewed.  There is mild glenohumeral osteoarthritis.  There is no fracture

## 2021-12-22 ENCOUNTER — APPOINTMENT (OUTPATIENT)
Dept: NEPHROLOGY | Facility: CLINIC | Age: 71
End: 2021-12-22
Payer: COMMERCIAL

## 2021-12-22 VITALS
WEIGHT: 159.83 LBS | OXYGEN SATURATION: 98 % | DIASTOLIC BLOOD PRESSURE: 69 MMHG | BODY MASS INDEX: 26.63 KG/M2 | HEART RATE: 69 BPM | HEIGHT: 65 IN | TEMPERATURE: 97.16 F | SYSTOLIC BLOOD PRESSURE: 126 MMHG

## 2021-12-22 PROCEDURE — 99213 OFFICE O/P EST LOW 20 MIN: CPT | Mod: GC

## 2021-12-23 LAB
INR PPP: 3.64 RATIO
PT BLD: 40.7 SEC

## 2021-12-23 NOTE — ADDENDUM
[FreeTextEntry1] : lab reviewed and noted to have supra-therapeutic INR of 3.64\par called and notified the patient \par advised to call the physician that manages his coumadin and he states he will

## 2021-12-23 NOTE — HISTORY OF PRESENT ILLNESS
[FreeTextEntry1] : Mr Watkins is a 72 y/o male with H/O  HIV ( > 20 years)  on tivicay instead of  descovy, which was  changed in dec 2018,  CKD (10 years, with baseline creatinine of 1.6- 2 mg/dl ) Tooth aspiration s/p bronchoscopy for removal ( Jan 2019: creat on admission 1.7 and 2.1 mg/dl on discharge) comes in for a follow up visit.  \par \par  {atient presents to the clinic today for a follow up appointment. He reports recently while playing golf he ruptured the long head of his right bicep muscle. He was evaluated by ortho and no surgical intervention was recommended. He has been doing physical therapy and denied any limitations in his movement. He denied any recent illnesses or hospitalizations. He denied any fevers, chills, chest pain, shortness of breath, nausea, vomiting or abdominal pain. \par \par  \par  \par  \par \par

## 2021-12-23 NOTE — ASSESSMENT
[FreeTextEntry1] : 70 y/o male with HIV, CKD( Baseline creatinine 1.5-2 mg/dl)  here for evaluation:\par \par CKD:( Baseline creatinine 1.5-2 mg/dl) \par Recently his serum creatinine had been ranging in the 1.5-1.7 mg/dl range\par recent cystatin C eGFR of 46 in 9/2021\par BP well controlled and euvolemic on exam \par Renal sonogram done in 5/18: simple left renal cyst.\par \par Atrial Fibrillation: \par patient is on coumadin and recently dose changed due to supra-therapeutic INR \par will check INR during office visit today\par  \par HIV: management per ID\par \par \par F/U in 6 month\par \par (c) [phone]

## 2022-01-07 ENCOUNTER — APPOINTMENT (OUTPATIENT)
Dept: RADIOLOGY | Facility: CLINIC | Age: 72
End: 2022-01-07
Payer: COMMERCIAL

## 2022-01-07 ENCOUNTER — OUTPATIENT (OUTPATIENT)
Dept: OUTPATIENT SERVICES | Facility: HOSPITAL | Age: 72
LOS: 1 days | End: 2022-01-07
Payer: COMMERCIAL

## 2022-01-07 DIAGNOSIS — R05.9 COUGH, UNSPECIFIED: ICD-10-CM

## 2022-01-07 DIAGNOSIS — Z00.8 ENCOUNTER FOR OTHER GENERAL EXAMINATION: ICD-10-CM

## 2022-01-07 PROCEDURE — 71046 X-RAY EXAM CHEST 2 VIEWS: CPT

## 2022-01-07 PROCEDURE — 71046 X-RAY EXAM CHEST 2 VIEWS: CPT | Mod: 26

## 2022-01-28 ENCOUNTER — APPOINTMENT (OUTPATIENT)
Dept: ENDOCRINOLOGY | Facility: CLINIC | Age: 72
End: 2022-01-28
Payer: COMMERCIAL

## 2022-01-28 VITALS
OXYGEN SATURATION: 96 % | TEMPERATURE: 209.48 F | BODY MASS INDEX: 26.13 KG/M2 | WEIGHT: 157 LBS | RESPIRATION RATE: 15 BRPM | SYSTOLIC BLOOD PRESSURE: 138 MMHG | DIASTOLIC BLOOD PRESSURE: 72 MMHG | HEART RATE: 69 BPM

## 2022-01-28 VITALS — DIASTOLIC BLOOD PRESSURE: 64 MMHG | SYSTOLIC BLOOD PRESSURE: 120 MMHG

## 2022-01-28 DIAGNOSIS — R79.89 OTHER SPECIFIED ABNORMAL FINDINGS OF BLOOD CHEMISTRY: ICD-10-CM

## 2022-01-28 DIAGNOSIS — E55.9 VITAMIN D DEFICIENCY, UNSPECIFIED: ICD-10-CM

## 2022-01-28 DIAGNOSIS — M1A.9XX0 CHRONIC GOUT, UNSPECIFIED, W/OUT TOPHUS (TOPHI): ICD-10-CM

## 2022-01-28 DIAGNOSIS — E04.2 NONTOXIC MULTINODULAR GOITER: ICD-10-CM

## 2022-01-28 DIAGNOSIS — M85.80 OTHER SPECIFIED DISORDERS OF BONE DENSITY AND STRUCTURE, UNSPECIFIED SITE: ICD-10-CM

## 2022-01-28 DIAGNOSIS — E03.9 HYPOTHYROIDISM, UNSPECIFIED: ICD-10-CM

## 2022-01-28 PROCEDURE — 99204 OFFICE O/P NEW MOD 45 MIN: CPT | Mod: 25

## 2022-01-28 PROCEDURE — 36415 COLL VENOUS BLD VENIPUNCTURE: CPT

## 2022-01-28 NOTE — HISTORY OF PRESENT ILLNESS
[FreeTextEntry1] : Mr. MATA  is a 71 year old  male  who presents for initial endocrine evaluation. He presents with regard to a history of mild prolactin elevations, along with Hx of Pre DM,  and hypothyroidism along with thyroid nodularity.\par He is taking LT4 88 mcg. Has been on dose for over a year. Previously was on 75 mcg. Takes in the morning away from food. Tolerating well.\par \par Had scan of pituitary which was normal. Denies headaches or ophthalmologic complaints.\par \par Denies FHx of hypothyroidism or diabetes. \par \par  Additional medical history includes that of CKD, HIV + x about 20 years, drug induced pancreatitis, gout (no recent flare ups) and CAD\par \par With regard to CKD- nephrologist Dr. Dill. \par Has had PRL levels in the 30's with Ct  of pituitary in past-was followed by Dr. Guzman.\par \par Last thyroid UDs from 7/23/2020 demonstrated a 5 mm cyst in left lobe and a 2 mm spongiform nodule rt lobe\par \par he too was followed with regard to elevated gonadotropins with normal to somewhat elevated testosterone.\par \par On Coumadin for A fib. Following with Dr. Jay. Does have pacemaker.\par Too taking multi and D3 3,000 IU daily. \par \par Had both doses of the covid vaccine as well as booster. Notes covid infection 2 months ago.\par \par Latest dexa scan from 7/18/2019 did show:\par T score left radius 1/3 -2.1\par T score femoral neck left -0.2\par T score total hip 0.2\par 10 year frax 2.9      %\par  Hip  frax 0.4           %

## 2022-02-03 LAB
25(OH)D3 SERPL-MCNC: 37.1 NG/ML
ACTH SER-ACNC: 44.9 PG/ML
CALCIUM SERPL-MCNC: 9.6 MG/DL
CORTIS SERPL-MCNC: 8.7 UG/DL
ESTIMATED AVERAGE GLUCOSE: 117 MG/DL
FSH SERPL-MCNC: 28.6 IU/L
HBA1C MFR BLD HPLC: 5.7 %
IGF-1 INTERP: NORMAL
IGF-I BLD-MCNC: 42 NG/ML
INR PPP: 1.64 RATIO
LH SERPL-ACNC: 20.1 IU/L
MAGNESIUM SERPL-MCNC: 1.7 MG/DL
PARATHYROID HORMONE INTACT: 14 PG/ML
PROLACTIN SERPL-MCNC: 18.5 NG/ML
PT BLD: 19.1 SEC
T3FREE SERPL-MCNC: 3.43 PG/ML
T4 FREE SERPL-MCNC: 1.7 NG/DL
THYROGLOB AB SERPL-ACNC: 38.3 IU/ML
THYROPEROXIDASE AB SERPL IA-ACNC: 19 IU/ML
TSH SERPL-ACNC: 2.5 UIU/ML
URATE SERPL-MCNC: 5.7 MG/DL

## 2022-02-04 LAB — SHBG SERPL-SCNC: 83.7 NMOL/L

## 2022-02-07 LAB
TESTOST FREE SERPL-MCNC: 9.7 PG/ML
TESTOST SERPL-MCNC: 642 NG/DL

## 2022-02-09 LAB
CORTICOSTEROID BIND GLOBULIN: 2.4 MG/DL
CORTIS SERPL-MCNC: 9.1 UG/DL
CORTISOL, FREE: 0.4 UG/DL
PFCX: 4.4 %

## 2022-02-17 ENCOUNTER — APPOINTMENT (OUTPATIENT)
Dept: CARDIOLOGY | Facility: CLINIC | Age: 72
End: 2022-02-17
Payer: COMMERCIAL

## 2022-02-17 ENCOUNTER — APPOINTMENT (OUTPATIENT)
Dept: ELECTROPHYSIOLOGY | Facility: CLINIC | Age: 72
End: 2022-02-17
Payer: COMMERCIAL

## 2022-02-17 VITALS
HEIGHT: 65 IN | BODY MASS INDEX: 25.83 KG/M2 | HEART RATE: 61 BPM | WEIGHT: 155 LBS | DIASTOLIC BLOOD PRESSURE: 90 MMHG | OXYGEN SATURATION: 100 % | SYSTOLIC BLOOD PRESSURE: 153 MMHG

## 2022-02-17 PROCEDURE — 93000 ELECTROCARDIOGRAM COMPLETE: CPT

## 2022-02-17 PROCEDURE — 93280 PM DEVICE PROGR EVAL DUAL: CPT

## 2022-02-17 PROCEDURE — 99214 OFFICE O/P EST MOD 30 MIN: CPT

## 2022-02-22 LAB
INR PPP: 2.45 RATIO
PT BLD: 27.7 SEC

## 2022-03-27 ENCOUNTER — FORM ENCOUNTER (OUTPATIENT)
Age: 72
End: 2022-03-27

## 2022-03-28 ENCOUNTER — APPOINTMENT (OUTPATIENT)
Dept: ENDOCRINOLOGY | Facility: CLINIC | Age: 72
End: 2022-03-28

## 2022-03-28 ENCOUNTER — APPOINTMENT (OUTPATIENT)
Dept: INFECTIOUS DISEASE | Facility: CLINIC | Age: 72
End: 2022-03-28
Payer: COMMERCIAL

## 2022-03-28 VITALS
RESPIRATION RATE: 15 BRPM | DIASTOLIC BLOOD PRESSURE: 69 MMHG | HEART RATE: 65 BPM | TEMPERATURE: 207.68 F | WEIGHT: 155 LBS | HEIGHT: 65 IN | BODY MASS INDEX: 25.83 KG/M2 | SYSTOLIC BLOOD PRESSURE: 144 MMHG | OXYGEN SATURATION: 97 %

## 2022-03-28 PROCEDURE — 99214 OFFICE O/P EST MOD 30 MIN: CPT

## 2022-03-28 NOTE — PHYSICAL EXAM
[General Appearance - Alert] : alert [General Appearance - In No Acute Distress] : in no acute distress [Sclera] : the sclera and conjunctiva were normal [PERRL With Normal Accommodation] : pupils were equal in size, round, reactive to light [Extraocular Movements] : extraocular movements were intact [Outer Ear] : the ears and nose were normal in appearance [Oropharynx] : the oropharynx was normal with no thrush [Neck Appearance] : the appearance of the neck was normal [Neck Cervical Mass (___cm)] : no neck mass was observed [Jugular Venous Distention Increased] : there was no jugular-venous distention [Thyroid Diffuse Enlargement] : the thyroid was not enlarged [Auscultation Breath Sounds / Voice Sounds] : lungs were clear to auscultation bilaterally [Heart Rate And Rhythm] : heart rate was normal and rhythm regular [Heart Sounds] : normal S1 and S2 [Heart Sounds Gallop] : no gallops [Murmurs] : no murmurs [Heart Sounds Pericardial Friction Rub] : no pericardial rub [FreeTextEntry1] : PPM Left chest [Full Pulse] : the pedal pulses are present [Edema] : there was no peripheral edema [Abdomen Soft] : soft [Bowel Sounds] : normal bowel sounds [Abdomen Tenderness] : non-tender [Abdomen Mass (___ Cm)] : no abdominal mass palpated [Costovertebral Angle Tenderness] : no CVA tenderness [No Palpable Adenopathy] : no palpable adenopathy [Musculoskeletal - Swelling] : no joint swelling [Nail Clubbing] : no clubbing  or cyanosis of the fingernails [Motor Tone] : muscle strength and tone were normal [Skin Color & Pigmentation] : normal skin color and pigmentation [] : no rash [Deep Tendon Reflexes (DTR)] : deep tendon reflexes were 2+ and symmetric [Sensation] : the sensory exam was normal to light touch and pinprick [No Focal Deficits] : no focal deficits [Oriented To Time, Place, And Person] : oriented to person, place, and time [Affect] : the affect was normal

## 2022-03-28 NOTE — ASSESSMENT
[FreeTextEntry1] : HIV controlled on daily Norvir/Prezista and Tivicay\par Continue current meds\par CKD- stable renal function \par Check CD4, VL, CBC, SMA, Cystatin C GFR\par Check PSA, U/A, Vitamin D, PT/INR on Coumadin\par Will go for fasted lipids later in the week.\par Followed by endocrinology for hypothyroidism, nodular thyroid, elevated PRL- improved no pituitary adenoma\par Gout on uric acid modifying therapy\par HTN- controlled\par Colonoscopy: 2017, had polyps- will F/U with Dr Velez for repeat this year\par Cardiology- follows Dr Jay may need battery change on PPM\par Vaccines UTD\par Exercising and golfing

## 2022-03-28 NOTE — HISTORY OF PRESENT ILLNESS
[Monogamous] : monogamous [Condom Use - All Encounters] : for every encounter [FreeTextEntry1] : HIV on daily Norvir/Prezista and Tivicay\par Takes daily in the evenings with dinner\par Tolerating well\par No missed doses this week\par \par Has been well\par No intercurrent illness

## 2022-04-10 ENCOUNTER — NON-APPOINTMENT (OUTPATIENT)
Age: 72
End: 2022-04-10

## 2022-04-10 NOTE — CARDIOLOGY SUMMARY
[de-identified] : 2/17/22\par Electronic ventricular pacemaker \par Pacemaker ECG, No further analysis \par INSUFFICIENT DATA

## 2022-04-10 NOTE — HISTORY OF PRESENT ILLNESS
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 No palpitations - however device reveals short episodes of AF with RVR\par No bleeding on Coumadin\par \par

## 2022-05-02 LAB
25(OH)D3 SERPL-MCNC: 36.8 NG/ML
ALBUMIN SERPL ELPH-MCNC: 4.2 G/DL
ALP BLD-CCNC: 37 U/L
ALT SERPL-CCNC: 12 U/L
ANION GAP SERPL CALC-SCNC: 14 MMOL/L
APPEARANCE: CLEAR
AST SERPL-CCNC: 21 U/L
BACTERIA: NEGATIVE
BASOPHILS # BLD AUTO: 0.04 K/UL
BASOPHILS NFR BLD AUTO: 0.7 %
BILIRUB SERPL-MCNC: 0.6 MG/DL
BILIRUBIN URINE: NEGATIVE
BLOOD URINE: NORMAL
BUN SERPL-MCNC: 34 MG/DL
CALCIUM SERPL-MCNC: 9.4 MG/DL
CHLORIDE SERPL-SCNC: 106 MMOL/L
CHOLEST SERPL-MCNC: 218 MG/DL
CO2 SERPL-SCNC: 23 MMOL/L
COLOR: YELLOW
CREAT SERPL-MCNC: 1.71 MG/DL
CYSTATIN C SERPL-MCNC: 1.5 MG/L
EGFR: 42 ML/MIN/1.73M2
EOSINOPHIL # BLD AUTO: 0.18 K/UL
EOSINOPHIL NFR BLD AUTO: 3.1 %
GFR/BSA.PRED SERPLBLD CYS-BASED-ARV: 43 ML/MIN/1.73M2
GLUCOSE QUALITATIVE U: NEGATIVE
GLUCOSE SERPL-MCNC: 105 MG/DL
HCT VFR BLD CALC: 47.7 %
HDLC SERPL-MCNC: 48 MG/DL
HGB BLD-MCNC: 15.3 G/DL
HYALINE CASTS: 0 /LPF
IMM GRANULOCYTES NFR BLD AUTO: 0.3 %
INR PPP: 2.37 RATIO
KETONES URINE: NEGATIVE
LDLC SERPL CALC-MCNC: 130 MG/DL
LEUKOCYTE ESTERASE URINE: NEGATIVE
LYMPHOCYTES # BLD AUTO: 1.66 K/UL
LYMPHOCYTES NFR BLD AUTO: 28.4 %
MAN DIFF?: NORMAL
MCHC RBC-ENTMCNC: 29.1 PG
MCHC RBC-ENTMCNC: 32.1 GM/DL
MCV RBC AUTO: 90.9 FL
MICROSCOPIC-UA: NORMAL
MONOCYTES # BLD AUTO: 0.47 K/UL
MONOCYTES NFR BLD AUTO: 8 %
NEUTROPHILS # BLD AUTO: 3.47 K/UL
NEUTROPHILS NFR BLD AUTO: 59.5 %
NITRITE URINE: NEGATIVE
NONHDLC SERPL-MCNC: 169 MG/DL
PH URINE: 6
PLATELET # BLD AUTO: 207 K/UL
POTASSIUM SERPL-SCNC: 4.4 MMOL/L
PROT SERPL-MCNC: 7 G/DL
PROTEIN URINE: ABNORMAL
PSA SERPL-MCNC: 0.76 NG/ML
PT BLD: 27.7 SEC
RBC # BLD: 5.25 M/UL
RBC # FLD: 13.3 %
RED BLOOD CELLS URINE: 1 /HPF
SODIUM SERPL-SCNC: 143 MMOL/L
SPECIFIC GRAVITY URINE: 1.02
SQUAMOUS EPITHELIAL CELLS: 0 /HPF
TRIGL SERPL-MCNC: 199 MG/DL
UROBILINOGEN URINE: NORMAL
WBC # FLD AUTO: 5.84 K/UL
WHITE BLOOD CELLS URINE: 1 /HPF

## 2022-05-03 ENCOUNTER — NON-APPOINTMENT (OUTPATIENT)
Age: 72
End: 2022-05-03

## 2022-05-03 ENCOUNTER — RX RENEWAL (OUTPATIENT)
Age: 72
End: 2022-05-03

## 2022-05-03 LAB
CD3 CELLS # BLD: 1002 /UL
CD3 CELLS NFR BLD: 66 %
CD3+CD4+ CELLS # BLD: 463 /UL
CD3+CD4+ CELLS NFR BLD: 31 %
CD3+CD4+ CELLS/CD3+CD8+ CLL SPEC: 0.89 RATIO
CD3+CD8+ CELLS # SPEC: 519 /UL
CD3+CD8+ CELLS NFR BLD: 34 %
HIV1 RNA # SERPL NAA+PROBE: NORMAL
HIV1 RNA # SERPL NAA+PROBE: NORMAL COPIES/ML
M TB IFN-G BLD-IMP: NEGATIVE
QUANTIFERON TB PLUS MITOGEN MINUS NIL: 9.92 IU/ML
QUANTIFERON TB PLUS NIL: 0.08 IU/ML
QUANTIFERON TB PLUS TB1 MINUS NIL: 0.08 IU/ML
QUANTIFERON TB PLUS TB2 MINUS NIL: 0 IU/ML
VIRAL LOAD INTERP: NORMAL
VIRAL LOAD LOG: NORMAL LG COP/ML

## 2022-05-23 ENCOUNTER — RX RENEWAL (OUTPATIENT)
Age: 72
End: 2022-05-23

## 2022-06-02 ENCOUNTER — RX RENEWAL (OUTPATIENT)
Age: 72
End: 2022-06-02

## 2022-07-08 ENCOUNTER — NON-APPOINTMENT (OUTPATIENT)
Age: 72
End: 2022-07-08

## 2022-07-08 ENCOUNTER — TRANSCRIPTION ENCOUNTER (OUTPATIENT)
Age: 72
End: 2022-07-08

## 2022-07-08 DIAGNOSIS — U07.1 COVID-19: ICD-10-CM

## 2022-07-12 ENCOUNTER — TRANSCRIPTION ENCOUNTER (OUTPATIENT)
Age: 72
End: 2022-07-12

## 2022-07-13 ENCOUNTER — APPOINTMENT (OUTPATIENT)
Dept: PHARMACY | Facility: CLINIC | Age: 72
End: 2022-07-13

## 2022-07-13 PROCEDURE — V5014D: CUSTOM | Mod: RT

## 2022-07-13 PROCEDURE — V5267D: CUSTOM | Mod: RT

## 2022-07-13 PROCEDURE — V5264E: CUSTOM | Mod: RT

## 2022-08-01 ENCOUNTER — APPOINTMENT (OUTPATIENT)
Dept: PHARMACY | Facility: CLINIC | Age: 72
End: 2022-08-01

## 2022-08-01 PROCEDURE — V5299A: CUSTOM | Mod: NC

## 2022-08-11 ENCOUNTER — APPOINTMENT (OUTPATIENT)
Dept: CV DIAGNOSITCS | Facility: HOSPITAL | Age: 72
End: 2022-08-11

## 2022-08-11 ENCOUNTER — OUTPATIENT (OUTPATIENT)
Dept: OUTPATIENT SERVICES | Facility: HOSPITAL | Age: 72
LOS: 1 days | End: 2022-08-11
Payer: COMMERCIAL

## 2022-08-11 DIAGNOSIS — N18.30 CHRONIC KIDNEY DISEASE, STAGE 3 UNSPECIFIED: ICD-10-CM

## 2022-08-11 PROCEDURE — 93306 TTE W/DOPPLER COMPLETE: CPT | Mod: 26

## 2022-08-11 PROCEDURE — 76376 3D RENDER W/INTRP POSTPROCES: CPT

## 2022-08-11 PROCEDURE — 93356 MYOCRD STRAIN IMG SPCKL TRCK: CPT

## 2022-08-11 PROCEDURE — 93306 TTE W/DOPPLER COMPLETE: CPT

## 2022-08-24 ENCOUNTER — APPOINTMENT (OUTPATIENT)
Dept: OTOLARYNGOLOGY | Facility: CLINIC | Age: 72
End: 2022-08-24

## 2022-08-25 ENCOUNTER — APPOINTMENT (OUTPATIENT)
Dept: CARDIOLOGY | Facility: CLINIC | Age: 72
End: 2022-08-25

## 2022-08-25 ENCOUNTER — APPOINTMENT (OUTPATIENT)
Dept: ELECTROPHYSIOLOGY | Facility: CLINIC | Age: 72
End: 2022-08-25

## 2022-08-25 ENCOUNTER — NON-APPOINTMENT (OUTPATIENT)
Age: 72
End: 2022-08-25

## 2022-08-25 ENCOUNTER — APPOINTMENT (OUTPATIENT)
Dept: PHARMACY | Facility: CLINIC | Age: 72
End: 2022-08-25

## 2022-08-25 VITALS
WEIGHT: 155 LBS | HEART RATE: 61 BPM | HEIGHT: 65 IN | OXYGEN SATURATION: 98 % | DIASTOLIC BLOOD PRESSURE: 70 MMHG | BODY MASS INDEX: 25.83 KG/M2 | SYSTOLIC BLOOD PRESSURE: 108 MMHG

## 2022-08-25 PROCEDURE — 93000 ELECTROCARDIOGRAM COMPLETE: CPT | Mod: 59

## 2022-08-25 PROCEDURE — 99214 OFFICE O/P EST MOD 30 MIN: CPT | Mod: 25

## 2022-08-25 PROCEDURE — V5299A: CUSTOM | Mod: NC

## 2022-08-25 PROCEDURE — 93280 PM DEVICE PROGR EVAL DUAL: CPT

## 2022-08-29 NOTE — HISTORY OF PRESENT ILLNESS
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 No palpitations\par No bleeding on Coumadin\par \par

## 2022-08-29 NOTE — CARDIOLOGY SUMMARY
[de-identified] : 8/25/22\par Sinus  Rhythm \par -Left atrial enlargement. \par  -  Negative precordial T-waves  -May be normal -consider anteroseptal ischemia. \par \par BORDERLINE\par

## 2022-08-29 NOTE — REASON FOR VISIT
[Arrhythmia/ECG Abnorrmalities] : arrhythmia/ECG abnormalities [Hyperlipidemia] : hyperlipidemia [Coronary Artery Disease] : coronary artery disease

## 2022-09-18 ENCOUNTER — FORM ENCOUNTER (OUTPATIENT)
Age: 72
End: 2022-09-18

## 2022-09-19 ENCOUNTER — APPOINTMENT (OUTPATIENT)
Dept: INFECTIOUS DISEASE | Facility: CLINIC | Age: 72
End: 2022-09-19

## 2022-09-19 ENCOUNTER — NON-APPOINTMENT (OUTPATIENT)
Age: 72
End: 2022-09-19

## 2022-09-19 VITALS
RESPIRATION RATE: 14 BRPM | WEIGHT: 150 LBS | DIASTOLIC BLOOD PRESSURE: 74 MMHG | HEIGHT: 65 IN | OXYGEN SATURATION: 100 % | SYSTOLIC BLOOD PRESSURE: 137 MMHG | BODY MASS INDEX: 24.99 KG/M2 | TEMPERATURE: 97.5 F | HEART RATE: 67 BPM

## 2022-09-19 DIAGNOSIS — E20.9 HYPOPARATHYROIDISM, UNSPECIFIED: ICD-10-CM

## 2022-09-19 PROCEDURE — G0008: CPT

## 2022-09-19 PROCEDURE — 99214 OFFICE O/P EST MOD 30 MIN: CPT | Mod: 25

## 2022-09-19 PROCEDURE — 90662 IIV NO PRSV INCREASED AG IM: CPT

## 2022-09-19 NOTE — HISTORY OF PRESENT ILLNESS
[Monogamous] : monogamous [FreeTextEntry1] : HIV on daily Norvir/Prezista and Tivicay\par Takes daily in the evening after dinner\par Tolerating well\par No missed doses this week\par \par Had travelled to [place]\par Will go to [facility] next month with grandchildren\par Plans cruise to [facility]

## 2022-09-19 NOTE — ASSESSMENT
[FreeTextEntry1] : HIV on daily Norvir/Prezista and Tivicay\par Continue current meds\par Check CD4, VL, CBC, SMA, INR and Cystatin C GFR\par Had some proteinuria, check U/A, Uprotein \par Vaccines, will get bivalent COVID at his hospital, flu shot today. Completed pneumovax\par Colonoscopy, 2017 due for F/U (+FH), plan colonoscopy and change PM battery- he will stop coumadin 3-5 days pre-procedure and restart within 24 hours post procedure\par Dental: earlier this year, no issues\par Sees endocrinologist, on levothyroxine, check TFTs\par Gout check uric acid\par Afib/Flutter- on coumadin, check INR\par Osteopenia on DEXA 2019, H/O hypoparthyroidism, check DEXA\par

## 2022-09-19 NOTE — PHYSICAL EXAM
[General Appearance - Alert] : alert [General Appearance - In No Acute Distress] : in no acute distress [Sclera] : the sclera and conjunctiva were normal [PERRL With Normal Accommodation] : pupils were equal in size, round, reactive to light [Extraocular Movements] : extraocular movements were intact [Outer Ear] : the ears and nose were normal in appearance [Oropharynx] : the oropharynx was normal with no thrush [Neck Appearance] : the appearance of the neck was normal [Neck Cervical Mass (___cm)] : no neck mass was observed [Jugular Venous Distention Increased] : there was no jugular-venous distention [Thyroid Diffuse Enlargement] : the thyroid was not enlarged [Auscultation Breath Sounds / Voice Sounds] : lungs were clear to auscultation bilaterally [Heart Rate And Rhythm] : heart rate was normal and rhythm regular [Heart Sounds] : normal S1 and S2 [Heart Sounds Gallop] : no gallops [Murmurs] : no murmurs [Heart Sounds Pericardial Friction Rub] : no pericardial rub [Full Pulse] : the pedal pulses are present [Edema] : there was no peripheral edema [Bowel Sounds] : normal bowel sounds [Abdomen Soft] : soft [Abdomen Tenderness] : non-tender [Abdomen Mass (___ Cm)] : no abdominal mass palpated [Costovertebral Angle Tenderness] : no CVA tenderness [No Palpable Adenopathy] : no palpable adenopathy [Musculoskeletal - Swelling] : no joint swelling [Nail Clubbing] : no clubbing  or cyanosis of the fingernails [Motor Tone] : muscle strength and tone were normal [Skin Color & Pigmentation] : normal skin color and pigmentation [] : no rash [Deep Tendon Reflexes (DTR)] : deep tendon reflexes were 2+ and symmetric [Sensation] : the sensory exam was normal to light touch and pinprick [No Focal Deficits] : no focal deficits [Oriented To Time, Place, And Person] : oriented to person, place, and time [Affect] : the affect was normal [FreeTextEntry1] : PPM left chest intact

## 2022-09-28 ENCOUNTER — RX RENEWAL (OUTPATIENT)
Age: 72
End: 2022-09-28

## 2022-09-29 LAB
APPEARANCE: CLEAR
BACTERIA: NEGATIVE
BASOPHILS # BLD AUTO: 0.05 K/UL
BASOPHILS NFR BLD AUTO: 1 %
BILIRUBIN URINE: NEGATIVE
BLOOD URINE: NEGATIVE
COLOR: NORMAL
CYSTATIN C SERPL-MCNC: 1.34 MG/L
EOSINOPHIL # BLD AUTO: 0.23 K/UL
EOSINOPHIL NFR BLD AUTO: 4.8 %
GFR/BSA.PRED SERPLBLD CYS-BASED-ARV: 50 ML/MIN/1.73M2
GLUCOSE QUALITATIVE U: NEGATIVE
HCT VFR BLD CALC: 41.8 %
HGB BLD-MCNC: 13.3 G/DL
HYALINE CASTS: 0 /LPF
IMM GRANULOCYTES NFR BLD AUTO: 0 %
INR PPP: 2.88 RATIO
KETONES URINE: NEGATIVE
LEUKOCYTE ESTERASE URINE: NEGATIVE
LYMPHOCYTES # BLD AUTO: 1.47 K/UL
LYMPHOCYTES NFR BLD AUTO: 30.7 %
MAN DIFF?: NORMAL
MCHC RBC-ENTMCNC: 29.4 PG
MCHC RBC-ENTMCNC: 31.8 GM/DL
MCV RBC AUTO: 92.5 FL
MICROSCOPIC-UA: NORMAL
MONOCYTES # BLD AUTO: 0.43 K/UL
MONOCYTES NFR BLD AUTO: 9 %
NEUTROPHILS # BLD AUTO: 2.61 K/UL
NEUTROPHILS NFR BLD AUTO: 54.5 %
NITRITE URINE: NEGATIVE
PH URINE: 7
PLATELET # BLD AUTO: 223 K/UL
PROTEIN URINE: ABNORMAL
PT BLD: 33.8 SEC
RBC # BLD: 4.52 M/UL
RBC # FLD: 13.4 %
RED BLOOD CELLS URINE: 1 /HPF
SPECIFIC GRAVITY URINE: 1.01
SQUAMOUS EPITHELIAL CELLS: 0 /HPF
T4 FREE SERPL-MCNC: 1.4 NG/DL
TSH SERPL-ACNC: 3.62 UIU/ML
UROBILINOGEN URINE: NORMAL
WBC # FLD AUTO: 4.79 K/UL
WHITE BLOOD CELLS URINE: 0 /HPF

## 2022-09-30 ENCOUNTER — NON-APPOINTMENT (OUTPATIENT)
Age: 72
End: 2022-09-30

## 2022-09-30 ENCOUNTER — OUTPATIENT (OUTPATIENT)
Dept: OUTPATIENT SERVICES | Facility: HOSPITAL | Age: 72
LOS: 1 days | End: 2022-09-30
Payer: COMMERCIAL

## 2022-09-30 ENCOUNTER — APPOINTMENT (OUTPATIENT)
Dept: RADIOLOGY | Facility: CLINIC | Age: 72
End: 2022-09-30

## 2022-09-30 DIAGNOSIS — Z00.8 ENCOUNTER FOR OTHER GENERAL EXAMINATION: ICD-10-CM

## 2022-09-30 DIAGNOSIS — M85.80 OTHER SPECIFIED DISORDERS OF BONE DENSITY AND STRUCTURE, UNSPECIFIED SITE: ICD-10-CM

## 2022-09-30 LAB
ALBUMIN SERPL ELPH-MCNC: 4.3 G/DL
ALP BLD-CCNC: 34 U/L
ALT SERPL-CCNC: 15 U/L
ANION GAP SERPL CALC-SCNC: 14 MMOL/L
AST SERPL-CCNC: 22 U/L
BILIRUB SERPL-MCNC: 0.4 MG/DL
BUN SERPL-MCNC: 37 MG/DL
CALCIUM SERPL-MCNC: 9.4 MG/DL
CALCIUM SERPL-MCNC: 9.4 MG/DL
CD3 CELLS # BLD: 831 /UL
CD3 CELLS NFR BLD: 64 %
CD3+CD4+ CELLS # BLD: 402 /UL
CD3+CD4+ CELLS NFR BLD: 31 %
CD3+CD4+ CELLS/CD3+CD8+ CLL SPEC: 0.99 RATIO
CD3+CD8+ CELLS # SPEC: 404 /UL
CD3+CD8+ CELLS NFR BLD: 31 %
CHLORIDE SERPL-SCNC: 105 MMOL/L
CO2 SERPL-SCNC: 22 MMOL/L
CREAT SERPL-MCNC: 2.47 MG/DL
CREAT SPEC-SCNC: 41 MG/DL
CREAT/PROT UR: 1.1 RATIO
EGFR: 27 ML/MIN/1.73M2
GLUCOSE SERPL-MCNC: 92 MG/DL
HIV1 RNA # SERPL NAA+PROBE: NORMAL
HIV1 RNA # SERPL NAA+PROBE: NORMAL COPIES/ML
PARATHYROID HORMONE INTACT: 20 PG/ML
POTASSIUM SERPL-SCNC: 4.5 MMOL/L
PROT SERPL-MCNC: 7 G/DL
PROT UR-MCNC: 45 MG/DL
SODIUM SERPL-SCNC: 141 MMOL/L
URATE SERPL-MCNC: 5.7 MG/DL
VIRAL LOAD INTERP: NORMAL
VIRAL LOAD LOG: NORMAL LG COP/ML

## 2022-09-30 PROCEDURE — 77080 DXA BONE DENSITY AXIAL: CPT

## 2022-09-30 PROCEDURE — 77080 DXA BONE DENSITY AXIAL: CPT | Mod: 26

## 2022-10-07 ENCOUNTER — LABORATORY RESULT (OUTPATIENT)
Age: 72
End: 2022-10-07

## 2022-10-07 ENCOUNTER — APPOINTMENT (OUTPATIENT)
Dept: NEPHROLOGY | Facility: CLINIC | Age: 72
End: 2022-10-07

## 2022-10-07 VITALS
HEIGHT: 65 IN | OXYGEN SATURATION: 96 % | SYSTOLIC BLOOD PRESSURE: 117 MMHG | DIASTOLIC BLOOD PRESSURE: 69 MMHG | BODY MASS INDEX: 25.34 KG/M2 | HEART RATE: 55 BPM | TEMPERATURE: 97.9 F | WEIGHT: 152.12 LBS

## 2022-10-07 PROCEDURE — 99214 OFFICE O/P EST MOD 30 MIN: CPT

## 2022-10-08 LAB
25(OH)D3 SERPL-MCNC: 40.4 NG/ML
ALBUMIN SERPL ELPH-MCNC: 4.3 G/DL
ANION GAP SERPL CALC-SCNC: 15 MMOL/L
APPEARANCE: CLEAR
BACTERIA: NEGATIVE
BASOPHILS # BLD AUTO: 0.05 K/UL
BASOPHILS NFR BLD AUTO: 1 %
BILIRUBIN URINE: NEGATIVE
BLOOD URINE: NEGATIVE
BUN SERPL-MCNC: 41 MG/DL
CALCIUM SERPL-MCNC: 9.7 MG/DL
CALCIUM SERPL-MCNC: 9.7 MG/DL
CHLORIDE SERPL-SCNC: 100 MMOL/L
CO2 SERPL-SCNC: 23 MMOL/L
COLOR: NORMAL
CREAT SERPL-MCNC: 2.19 MG/DL
CREAT SPEC-SCNC: 45 MG/DL
CREAT/PROT UR: 0.1 RATIO
CYSTATIN C SERPL-MCNC: 1.72 MG/L
DSDNA AB SER-ACNC: <12 IU/ML
EGFR: 31 ML/MIN/1.73M2
EOSINOPHIL # BLD AUTO: 0.25 K/UL
EOSINOPHIL NFR BLD AUTO: 4.9 %
FERRITIN SERPL-MCNC: 229 NG/ML
GFR/BSA.PRED SERPLBLD CYS-BASED-ARV: 36 ML/MIN/1.73M2
GLUCOSE QUALITATIVE U: NEGATIVE
GLUCOSE SERPL-MCNC: 51 MG/DL
HCT VFR BLD CALC: 41.4 %
HGB BLD-MCNC: 13.4 G/DL
HYALINE CASTS: 0 /LPF
IMM GRANULOCYTES NFR BLD AUTO: 0.2 %
IRON SATN MFR SERPL: 29 %
IRON SERPL-MCNC: 92 UG/DL
KETONES URINE: NEGATIVE
LEUKOCYTE ESTERASE URINE: NEGATIVE
LYMPHOCYTES # BLD AUTO: 1.7 K/UL
LYMPHOCYTES NFR BLD AUTO: 33.5 %
MAGNESIUM SERPL-MCNC: 1.9 MG/DL
MAN DIFF?: NORMAL
MCHC RBC-ENTMCNC: 29.2 PG
MCHC RBC-ENTMCNC: 32.4 GM/DL
MCV RBC AUTO: 90.2 FL
MICROSCOPIC-UA: NORMAL
MONOCYTES # BLD AUTO: 0.59 K/UL
MONOCYTES NFR BLD AUTO: 11.6 %
NEUTROPHILS # BLD AUTO: 2.48 K/UL
NEUTROPHILS NFR BLD AUTO: 48.8 %
NITRITE URINE: NEGATIVE
PARATHYROID HORMONE INTACT: 11 PG/ML
PH URINE: 6.5
PHOSPHATE SERPL-MCNC: 3.6 MG/DL
PLATELET # BLD AUTO: 195 K/UL
POTASSIUM SERPL-SCNC: 4.7 MMOL/L
PROT UR-MCNC: 5 MG/DL
PROTEIN URINE: NEGATIVE
RBC # BLD: 4.59 M/UL
RBC # FLD: 13.6 %
RED BLOOD CELLS URINE: 1 /HPF
SODIUM SERPL-SCNC: 138 MMOL/L
SPECIFIC GRAVITY URINE: 1.01
SQUAMOUS EPITHELIAL CELLS: 0 /HPF
TIBC SERPL-MCNC: 316 UG/DL
UIBC SERPL-MCNC: 224 UG/DL
UROBILINOGEN URINE: NORMAL
WBC # FLD AUTO: 5.08 K/UL
WHITE BLOOD CELLS URINE: 0 /HPF

## 2022-10-09 LAB
C3 SERPL-MCNC: 98 MG/DL
C4 SERPL-MCNC: 28 MG/DL

## 2022-10-10 NOTE — ASSESSMENT
[FreeTextEntry1] : 71 y/o male with HIV, CKD( Baseline creatinine 1.5-2 mg/dl)  here for evaluation:\par \par CKD:( Baseline creatinine 1.5-2 mg/dl) \par Recently his serum creatinine had been ranging in the 1.5-1.7 mg/dl range\par His creatinine last month was up at 2.4 and also with 1 gm or proteinuria . Unclear cause. Possibly a lab error\par Now with creatinine down to 2.1 mg /dl and no proteinuria. More c/w his baseline values.  \par Minimal secondary w/u sent . So far negative, Await the rest of the results\par BP well controlled and euvolemic on exam \par Renal sonogram done in 5/2018: simple left renal cyst. Will repeat \par Repeat blood work in 1 month\par \par HR well controlled\par Atrial Fibrillation: \par patient is on coumadin \par  \par HIV: management per ID\par \par \par F/U in 4 month\par \par (c) [phone]

## 2022-10-10 NOTE — HISTORY OF PRESENT ILLNESS
[FreeTextEntry1] : Mr Watkins is a 70 y/o male with H/O  HIV ( > 20 years)  on tivicay instead of  descovy, which was  changed in dec 2018,  CKD (10 years, with baseline creatinine of 1.6- 2 mg/dl ) Tooth aspiration s/p bronchoscopy for removal ( Jan 2019: creat on admission 1.7 and 2.1 mg/dl on discharge), Bicep tear  comes in for a follow up visit.  \par \par  He  presents to the clinic today for a follow up appointment. Has been doing well. He had covid in July and needed Paxlovid.  He denied any fevers, chills, chest pain, shortness of breath, nausea, vomiting or abdominal pain. His bicep tear has healed with no surgery. Recent labs with creatinine increase to the mid 2's. GFR with cystatin C was OK. BP has been well controlled. No new medication changes\par \par  \par  \par  \par  \par \par

## 2022-10-11 LAB — ANA SER IF-ACNC: NEGATIVE

## 2022-10-13 ENCOUNTER — APPOINTMENT (OUTPATIENT)
Dept: PHARMACY | Facility: CLINIC | Age: 72
End: 2022-10-13

## 2022-10-13 PROCEDURE — V5299A: CUSTOM | Mod: NC

## 2022-10-17 ENCOUNTER — APPOINTMENT (OUTPATIENT)
Dept: CARDIOLOGY | Facility: CLINIC | Age: 72
End: 2022-10-17

## 2022-10-17 ENCOUNTER — NON-APPOINTMENT (OUTPATIENT)
Age: 72
End: 2022-10-17

## 2022-10-17 VITALS
HEART RATE: 66 BPM | DIASTOLIC BLOOD PRESSURE: 77 MMHG | OXYGEN SATURATION: 100 % | WEIGHT: 152 LBS | BODY MASS INDEX: 25.33 KG/M2 | HEIGHT: 65 IN | SYSTOLIC BLOOD PRESSURE: 160 MMHG

## 2022-10-17 NOTE — REASON FOR VISIT
[FreeTextEntry3] : Dear            . I saw your patient CHANDRAKANT MATA on 10/17/2022 . Please see the note below for the assessment and plan. \par \par CHANDRAKANT MATA  was seen  for an initial consultation at the [facility] at [facility] on 10/17/2022.   Mr. MATA was referred by  (NAME of referring DR) ralph for hereditary cardiac predisposition risk assessment and counseling, due to( indicate reason for referral.)\par \par

## 2022-10-17 NOTE — HISTORY OF PRESENT ILLNESS
[FreeTextEntry1] : CHANDRAKANT MATA is a 73 yo M\par \par \par today he is referred for a cardiogenomic evaluation \par \par he presents for a genetic evaluation w due to 1 brother with colon Ca\par one brother rectal CA

## 2022-10-20 ENCOUNTER — APPOINTMENT (OUTPATIENT)
Dept: SPEECH THERAPY | Facility: CLINIC | Age: 72
End: 2022-10-20

## 2022-10-20 ENCOUNTER — OUTPATIENT (OUTPATIENT)
Dept: OUTPATIENT SERVICES | Facility: HOSPITAL | Age: 72
LOS: 1 days | End: 2022-10-20
Payer: COMMERCIAL

## 2022-10-20 ENCOUNTER — OUTPATIENT (OUTPATIENT)
Dept: OUTPATIENT SERVICES | Facility: HOSPITAL | Age: 72
LOS: 1 days | Discharge: HOME | End: 2022-10-20

## 2022-10-20 ENCOUNTER — APPOINTMENT (OUTPATIENT)
Dept: ULTRASOUND IMAGING | Facility: CLINIC | Age: 72
End: 2022-10-20

## 2022-10-20 DIAGNOSIS — B20 HUMAN IMMUNODEFICIENCY VIRUS [HIV] DISEASE: ICD-10-CM

## 2022-10-20 PROCEDURE — 76770 US EXAM ABDO BACK WALL COMP: CPT | Mod: 26

## 2022-10-20 PROCEDURE — 76770 US EXAM ABDO BACK WALL COMP: CPT

## 2022-10-24 DIAGNOSIS — H90.3 SENSORINEURAL HEARING LOSS, BILATERAL: ICD-10-CM

## 2022-10-27 ENCOUNTER — APPOINTMENT (OUTPATIENT)
Dept: ELECTROPHYSIOLOGY | Facility: CLINIC | Age: 72
End: 2022-10-27

## 2022-10-27 ENCOUNTER — NON-APPOINTMENT (OUTPATIENT)
Age: 72
End: 2022-10-27

## 2022-10-27 VITALS
DIASTOLIC BLOOD PRESSURE: 77 MMHG | HEIGHT: 65 IN | WEIGHT: 152 LBS | OXYGEN SATURATION: 99 % | BODY MASS INDEX: 25.33 KG/M2 | SYSTOLIC BLOOD PRESSURE: 158 MMHG | HEART RATE: 55 BPM

## 2022-10-27 PROCEDURE — 93280 PM DEVICE PROGR EVAL DUAL: CPT

## 2022-10-27 PROCEDURE — 93000 ELECTROCARDIOGRAM COMPLETE: CPT | Mod: 59

## 2022-11-14 ENCOUNTER — TRANSCRIPTION ENCOUNTER (OUTPATIENT)
Age: 72
End: 2022-11-14

## 2022-11-18 NOTE — END OF VISIT
[>50% of Time Spent on Counseling and Coordination of Care for  ___] : Greater than 50% of the encounter time was spent on counseling and coordination of care for [unfilled] Colchicine Counseling:  Patient counseled regarding adverse effects including but not limited to stomach upset (nausea, vomiting, stomach pain, or diarrhea).  Patient instructed to limit alcohol consumption while taking this medication.  Colchicine may reduce blood counts especially with prolonged use.  The patient understands that monitoring of kidney function and blood counts may be required, especially at baseline. The patient verbalized understanding of the proper use and possible adverse effects of colchicine.  All of the patient's questions and concerns were addressed.

## 2022-12-08 LAB
ALBUMIN SERPL ELPH-MCNC: 4.2 G/DL
ANION GAP SERPL CALC-SCNC: 14 MMOL/L
APPEARANCE: CLEAR
BACTERIA: NEGATIVE
BILIRUBIN URINE: NEGATIVE
BLOOD URINE: NEGATIVE
BUN SERPL-MCNC: 34 MG/DL
CALCIUM SERPL-MCNC: 9.7 MG/DL
CHLORIDE SERPL-SCNC: 106 MMOL/L
CO2 SERPL-SCNC: 23 MMOL/L
COLOR: NORMAL
CREAT SERPL-MCNC: 1.97 MG/DL
CREAT SPEC-SCNC: 181 MG/DL
CREAT/PROT UR: 0.3 RATIO
EGFR: 35 ML/MIN/1.73M2
GLUCOSE QUALITATIVE U: NEGATIVE
GLUCOSE SERPL-MCNC: 96 MG/DL
HYALINE CASTS: 3 /LPF
KETONES URINE: NEGATIVE
LEUKOCYTE ESTERASE URINE: NEGATIVE
MICROSCOPIC-UA: NORMAL
NITRITE URINE: NEGATIVE
PH URINE: 6
PHOSPHATE SERPL-MCNC: 3.9 MG/DL
POTASSIUM SERPL-SCNC: 4.5 MMOL/L
PROT UR-MCNC: 51 MG/DL
PROTEIN URINE: ABNORMAL
PSA FREE FLD-MCNC: 38 %
PSA FREE SERPL-MCNC: 0.29 NG/ML
PSA SERPL-MCNC: 0.75 NG/ML
RED BLOOD CELLS URINE: 1 /HPF
SODIUM SERPL-SCNC: 142 MMOL/L
SPECIFIC GRAVITY URINE: 1.02
SQUAMOUS EPITHELIAL CELLS: 1 /HPF
UROBILINOGEN URINE: NORMAL
WHITE BLOOD CELLS URINE: 2 /HPF

## 2022-12-13 ENCOUNTER — OUTPATIENT (OUTPATIENT)
Dept: OUTPATIENT SERVICES | Facility: HOSPITAL | Age: 72
LOS: 1 days | End: 2022-12-13
Payer: COMMERCIAL

## 2022-12-13 DIAGNOSIS — Z11.52 ENCOUNTER FOR SCREENING FOR COVID-19: ICD-10-CM

## 2022-12-13 DIAGNOSIS — Z01.818 ENCOUNTER FOR OTHER PREPROCEDURAL EXAMINATION: ICD-10-CM

## 2022-12-13 LAB — SARS-COV-2 RNA SPEC QL NAA+PROBE: SIGNIFICANT CHANGE UP

## 2022-12-13 PROCEDURE — C9803: CPT

## 2022-12-13 PROCEDURE — U0003: CPT

## 2022-12-13 PROCEDURE — U0005: CPT

## 2022-12-14 LAB
ALBUMIN SERPL ELPH-MCNC: 4.2 G/DL
ALP BLD-CCNC: 33 U/L
ALT SERPL-CCNC: 11 U/L
ANION GAP SERPL CALC-SCNC: 10 MMOL/L
AST SERPL-CCNC: 17 U/L
BASOPHILS # BLD AUTO: 0.07 K/UL
BASOPHILS NFR BLD AUTO: 1.5 %
BILIRUB SERPL-MCNC: 0.6 MG/DL
BUN SERPL-MCNC: 37 MG/DL
CALCIUM SERPL-MCNC: 9.5 MG/DL
CHLORIDE SERPL-SCNC: 108 MMOL/L
CO2 SERPL-SCNC: 24 MMOL/L
CREAT SERPL-MCNC: 1.95 MG/DL
EGFR: 36 ML/MIN/1.73M2
EOSINOPHIL # BLD AUTO: 0.2 K/UL
EOSINOPHIL NFR BLD AUTO: 4.2 %
GLUCOSE SERPL-MCNC: 102 MG/DL
HCT VFR BLD CALC: 44.3 %
HGB BLD-MCNC: 13.8 G/DL
IMM GRANULOCYTES NFR BLD AUTO: 0.4 %
INR PPP: 1.75 RATIO
LYMPHOCYTES # BLD AUTO: 1.73 K/UL
LYMPHOCYTES NFR BLD AUTO: 36.6 %
MAN DIFF?: NORMAL
MCHC RBC-ENTMCNC: 29.1 PG
MCHC RBC-ENTMCNC: 31.2 GM/DL
MCV RBC AUTO: 93.5 FL
MONOCYTES # BLD AUTO: 0.41 K/UL
MONOCYTES NFR BLD AUTO: 8.7 %
NEUTROPHILS # BLD AUTO: 2.3 K/UL
NEUTROPHILS NFR BLD AUTO: 48.6 %
PLATELET # BLD AUTO: 186 K/UL
POTASSIUM SERPL-SCNC: 4.9 MMOL/L
PROT SERPL-MCNC: 6.9 G/DL
PT BLD: 20.6 SEC
RBC # BLD: 4.74 M/UL
RBC # FLD: 14.2 %
SODIUM SERPL-SCNC: 142 MMOL/L
WBC # FLD AUTO: 4.73 K/UL

## 2022-12-15 ENCOUNTER — APPOINTMENT (OUTPATIENT)
Dept: PEDIATRIC MEDICAL GENETICS | Facility: CLINIC | Age: 72
End: 2022-12-15
Payer: COMMERCIAL

## 2022-12-15 PROCEDURE — 96040: CPT | Mod: 95

## 2022-12-16 ENCOUNTER — OUTPATIENT (OUTPATIENT)
Dept: OUTPATIENT SERVICES | Facility: HOSPITAL | Age: 72
LOS: 1 days | End: 2022-12-16
Payer: COMMERCIAL

## 2022-12-16 ENCOUNTER — TRANSCRIPTION ENCOUNTER (OUTPATIENT)
Age: 72
End: 2022-12-16

## 2022-12-16 VITALS
HEART RATE: 60 BPM | HEIGHT: 65 IN | DIASTOLIC BLOOD PRESSURE: 103 MMHG | SYSTOLIC BLOOD PRESSURE: 157 MMHG | OXYGEN SATURATION: 97 % | WEIGHT: 151.9 LBS | TEMPERATURE: 98 F | RESPIRATION RATE: 18 BRPM

## 2022-12-16 VITALS — HEART RATE: 78 BPM

## 2022-12-16 DIAGNOSIS — Z90.49 ACQUIRED ABSENCE OF OTHER SPECIFIED PARTS OF DIGESTIVE TRACT: Chronic | ICD-10-CM

## 2022-12-16 DIAGNOSIS — Z45.010 ENCOUNTER FOR CHECKING AND TESTING OF CARDIAC PACEMAKER PULSE GENERATOR [BATTERY]: ICD-10-CM

## 2022-12-16 PROCEDURE — 93005 ELECTROCARDIOGRAM TRACING: CPT

## 2022-12-16 PROCEDURE — C1889: CPT

## 2022-12-16 PROCEDURE — C1785: CPT

## 2022-12-16 PROCEDURE — 33228 REMV&REPLC PM GEN DUAL LEAD: CPT

## 2022-12-16 PROCEDURE — 93010 ELECTROCARDIOGRAM REPORT: CPT

## 2022-12-16 RX ORDER — ATORVASTATIN CALCIUM 80 MG/1
1 TABLET, FILM COATED ORAL
Qty: 0 | Refills: 0 | DISCHARGE

## 2022-12-16 RX ORDER — FOLIC ACID 0.8 MG
1 TABLET ORAL
Qty: 0 | Refills: 0 | DISCHARGE

## 2022-12-16 RX ORDER — WARFARIN SODIUM 2.5 MG/1
1 TABLET ORAL
Qty: 0 | Refills: 0 | DISCHARGE

## 2022-12-16 RX ORDER — FENOFIBRATE,MICRONIZED 130 MG
1 CAPSULE ORAL
Qty: 0 | Refills: 0 | DISCHARGE

## 2022-12-16 RX ORDER — CEFAZOLIN SODIUM 1 G
2000 VIAL (EA) INJECTION ONCE
Refills: 0 | Status: DISCONTINUED | OUTPATIENT
Start: 2022-12-16 | End: 2022-12-30

## 2022-12-16 RX ORDER — TADALAFIL 10 MG/1
1 TABLET, FILM COATED ORAL
Qty: 0 | Refills: 0 | DISCHARGE

## 2022-12-16 RX ORDER — CEPHALEXIN 500 MG
1 CAPSULE ORAL
Qty: 9 | Refills: 0
Start: 2022-12-16 | End: 2022-12-18

## 2022-12-16 RX ORDER — ASPIRIN/CALCIUM CARB/MAGNESIUM 324 MG
1 TABLET ORAL
Qty: 0 | Refills: 0 | DISCHARGE

## 2022-12-16 RX ORDER — FEBUXOSTAT 40 MG/1
1 TABLET ORAL
Qty: 0 | Refills: 0 | DISCHARGE

## 2022-12-16 RX ORDER — DOLUTEGRAVIR SODIUM 25 MG/1
1 TABLET, FILM COATED ORAL
Qty: 0 | Refills: 0 | DISCHARGE

## 2022-12-16 RX ORDER — CHLORHEXIDINE GLUCONATE 213 G/1000ML
1 SOLUTION TOPICAL DAILY
Refills: 0 | Status: DISCONTINUED | OUTPATIENT
Start: 2022-12-16 | End: 2022-12-30

## 2022-12-16 RX ORDER — SODIUM CHLORIDE 9 MG/ML
3 INJECTION INTRAMUSCULAR; INTRAVENOUS; SUBCUTANEOUS EVERY 8 HOURS
Refills: 0 | Status: DISCONTINUED | OUTPATIENT
Start: 2022-12-16 | End: 2022-12-30

## 2022-12-16 RX ORDER — RITONAVIR 100 MG/1
1 TABLET, FILM COATED ORAL
Qty: 0 | Refills: 0 | DISCHARGE

## 2022-12-16 RX ORDER — WARFARIN SODIUM 2.5 MG/1
1.5 TABLET ORAL
Qty: 0 | Refills: 0 | DISCHARGE

## 2022-12-16 RX ORDER — METOPROLOL TARTRATE 50 MG
1 TABLET ORAL
Qty: 0 | Refills: 0 | DISCHARGE

## 2022-12-16 RX ORDER — CHOLECALCIFEROL (VITAMIN D3) 125 MCG
3000 CAPSULE ORAL
Qty: 0 | Refills: 0 | DISCHARGE

## 2022-12-16 RX ORDER — LEVOTHYROXINE SODIUM 125 MCG
1 TABLET ORAL
Qty: 0 | Refills: 0 | DISCHARGE

## 2022-12-16 RX ORDER — DARUNAVIR 75 MG/1
1 TABLET, FILM COATED ORAL
Qty: 0 | Refills: 0 | DISCHARGE

## 2022-12-16 NOTE — H&P CARDIOLOGY - NSICDXPASTMEDICALHX_GEN_ALL_CORE_FT
PAST MEDICAL HISTORY:  Atherosclerosis     Atrial fibrillation     Atrial fibrillation s/p DC CV in the past    Atrial flutter     Dyslipidemia     HIV (human immunodeficiency virus infection)     Paroxysmal atrial tachycardia     Renal insufficiency     Syncope

## 2022-12-16 NOTE — DISCHARGE NOTE NURSING/CASE MANAGEMENT/SOCIAL WORK - PATIENT PORTAL LINK FT
You can access the FollowMyHealth Patient Portal offered by [facility] by registering at the following website: [email or web address]. By joining [employer]s FollowMyHealth portal, you will also be able to view your health information using other applications (apps) compatible with our system.

## 2022-12-16 NOTE — H&P CARDIOLOGY - HISTORY OF PRESENT ILLNESS
73 y/o M with PMHx of HIV on HAART,, AF on coumadin (last dose 2 days ago), s/p 2 cardioversions and ablation (2011), SSS with PPM, HTN, HLD, gout and prediabetes presents for PPM generator change. Pt denies palpitation, SOB or dizziness.

## 2022-12-16 NOTE — ASU DISCHARGE PLAN (ADULT/PEDIATRIC) - NS MD DC FALL RISK RISK
For information on Fall & Injury Prevention, visit: [email or web address] OR  [email or web address] OR  https://www.cdc.gov/steadi/patient.html

## 2022-12-16 NOTE — DISCHARGE NOTE NURSING/CASE MANAGEMENT/SOCIAL WORK - NSDCPEFALRISK_GEN_ALL_CORE
For information on Fall & Injury Prevention, visit: [email or web address] OR  [email or web address] OR  https://www.cdc.gov/steadi/patient.html

## 2022-12-16 NOTE — H&P CARDIOLOGY - NSICDXPASTSURGICALHX_GEN_ALL_CORE_FT
PAST SURGICAL HISTORY:  History of cholecystectomy     History of prior Afib ablation treatment

## 2022-12-16 NOTE — ASU DISCHARGE PLAN (ADULT/PEDIATRIC) - ASU DC SPECIAL INSTRUCTIONSFT
WOUND CARE:  Do NOT scrub, rub, or pick at your incision site  AFTER 3 DAYS you may SHOWER  - use mild soap and gentle warm, water stream, pat dry  DO NOT apply lotions, creams, ointments, powder, perfumes to your incision site  DO NOT SOAK your site for 4-6 weeks (no baths, no pools, no tubs, etc...)  wear loose clothing around site for 1-2 weeks  IF surgical tape was used DO NOT remove the strips, they will fall off after 7days, if glue was used, it will naturally fall off within 3 weeks  if staples were used, they will be removed in 7-10 days by your doctor    ACTIVITY:  for 2 weeks AFTER  your procedure  - DO NOT RAISE your arm above shoulder level (on the same side of your incision)  for 4 weeks AFTER your procedure   - DO NOT LIFT anything 10 lbs or heavier (on the side of your implant)   - certain activities may be limited longer, those that involve swinging your arm, and will be discussed with your EP doctor  DO NOT DRIVE until your EP Doctor or nurse practitioner/physician assistant states it is safe to do so  A follow up appointment in 7-14 days will be arranged before your discharge    ID CARD:   you will receive an ID CARD and device company booklet   - please carry that card with you at all times    ***CALL YOUR DOCTOR ***  IF you have fever, chills, body aches, or severe pain, swelling, redness, heat, yellow drainage from your incision site  IF bleeding  or significant new swelling from your puncture site  IF your experience lightheadedness, dizziness, or fainting spell.  IF unable to contact with your doctor, you may call the Cardiology Office at [facility] at [phone]

## 2022-12-16 NOTE — PATIENT PROFILE ADULT - FALL HARM RISK - UNIVERSAL INTERVENTIONS
Bed in lowest position, wheels locked, appropriate side rails in place/Call bell, personal items and telephone in reach/Instruct patient to call for assistance before getting out of bed or chair/Non-slip footwear when patient is out of bed/[place] to call system/Physically safe environment - no spills, clutter or unnecessary equipment/Purposeful Proactive Rounding/Room/bathroom lighting operational, light cord in reach

## 2022-12-16 NOTE — ASU DISCHARGE PLAN (ADULT/PEDIATRIC) - CARE PROVIDER_API CALL
Scott Frazier)  Cardiac Electrophysiology; Cardiology  [address] [place] [address]  Phone: [phone]  Fax: [phone]  Scheduled Appointment: 12/28/2022 09:40 AM

## 2022-12-17 ENCOUNTER — NON-APPOINTMENT (OUTPATIENT)
Age: 72
End: 2022-12-17

## 2022-12-27 ENCOUNTER — RX RENEWAL (OUTPATIENT)
Age: 72
End: 2022-12-27

## 2022-12-30 ENCOUNTER — APPOINTMENT (OUTPATIENT)
Dept: ELECTROPHYSIOLOGY | Facility: CLINIC | Age: 72
End: 2022-12-30

## 2023-01-09 ENCOUNTER — TRANSCRIPTION ENCOUNTER (OUTPATIENT)
Age: 73
End: 2023-01-09

## 2023-01-11 ENCOUNTER — NON-APPOINTMENT (OUTPATIENT)
Age: 73
End: 2023-01-11

## 2023-03-29 ENCOUNTER — NON-APPOINTMENT (OUTPATIENT)
Age: 73
End: 2023-03-29

## 2023-03-29 ENCOUNTER — APPOINTMENT (OUTPATIENT)
Dept: CARDIOLOGY | Facility: CLINIC | Age: 73
End: 2023-03-29
Payer: COMMERCIAL

## 2023-03-29 ENCOUNTER — APPOINTMENT (OUTPATIENT)
Dept: ELECTROPHYSIOLOGY | Facility: CLINIC | Age: 73
End: 2023-03-29
Payer: COMMERCIAL

## 2023-03-29 VITALS
BODY MASS INDEX: 25.33 KG/M2 | HEART RATE: 59 BPM | WEIGHT: 152 LBS | DIASTOLIC BLOOD PRESSURE: 81 MMHG | HEIGHT: 65 IN | SYSTOLIC BLOOD PRESSURE: 150 MMHG | OXYGEN SATURATION: 99 %

## 2023-03-29 PROCEDURE — 99214 OFFICE O/P EST MOD 30 MIN: CPT | Mod: 25

## 2023-03-29 PROCEDURE — 93280 PM DEVICE PROGR EVAL DUAL: CPT

## 2023-03-29 PROCEDURE — 93000 ELECTROCARDIOGRAM COMPLETE: CPT

## 2023-04-05 ENCOUNTER — RX RENEWAL (OUTPATIENT)
Age: 73
End: 2023-04-05

## 2023-05-02 ENCOUNTER — RX RENEWAL (OUTPATIENT)
Age: 73
End: 2023-05-02

## 2023-05-25 ENCOUNTER — APPOINTMENT (OUTPATIENT)
Dept: NEPHROLOGY | Facility: CLINIC | Age: 73
End: 2023-05-25
Payer: COMMERCIAL

## 2023-05-25 VITALS
DIASTOLIC BLOOD PRESSURE: 78 MMHG | BODY MASS INDEX: 25.71 KG/M2 | TEMPERATURE: 97.3 F | SYSTOLIC BLOOD PRESSURE: 149 MMHG | HEIGHT: 65 IN | HEART RATE: 69 BPM | WEIGHT: 154.32 LBS | OXYGEN SATURATION: 100 %

## 2023-05-25 VITALS — DIASTOLIC BLOOD PRESSURE: 76 MMHG | SYSTOLIC BLOOD PRESSURE: 136 MMHG

## 2023-05-25 DIAGNOSIS — K29.70 GASTRITIS, UNSPECIFIED, W/OUT BLEEDING: ICD-10-CM

## 2023-05-25 PROCEDURE — 99213 OFFICE O/P EST LOW 20 MIN: CPT

## 2023-05-25 RX ORDER — WARFARIN 1 MG/1
1 TABLET ORAL
Refills: 0 | Status: ACTIVE | COMMUNITY
Start: 2018-11-23

## 2023-05-25 RX ORDER — FAMOTIDINE 40 MG/1
40 TABLET, FILM COATED ORAL
Qty: 30 | Refills: 0 | Status: ACTIVE | COMMUNITY
Start: 2023-05-25 | End: 1900-01-01

## 2023-05-25 NOTE — HISTORY OF PRESENT ILLNESS
[FreeTextEntry1] : Mr Watkins is a 72 y/o male with H/O  HIV ( > 20 years)  on tivicay instead of  descovy, which was  changed in dec 2018,  CKD (10 years, with baseline creatinine of 1.6- 2 mg/dl ) Tooth aspiration s/p bronchoscopy for removal ( Jan 2019: creat on admission 1.7 and 2.1 mg/dl on discharge), Bicep tear , Covid treated with paxlovid ( 7/2022)  comes in for a follow up visit.  \par \par \par  \par Comes for a follow up visit today. Feels pretty good. Denies any lower extremity edema, SOB. No nausea or vomiting. Hydrating herself well. No recent infections or hospitalizations. Battery replaced in Feb\par Had EGD: +Gastritis. On Famotidine\par Needs to repeat colonoscopy ( prep was not good) \par  \par \par

## 2023-05-25 NOTE — PHYSICAL EXAM
[General Appearance - Alert] : alert [General Appearance - In No Acute Distress] : in no acute distress [General Appearance - Well Nourished] : well nourished [Neck Cervical Mass (___cm)] : no neck mass was observed [] : no respiratory distress [Respiration, Rhythm And Depth] : normal respiratory rhythm and effort [Exaggerated Use Of Accessory Muscles For Inspiration] : no accessory muscle use [Auscultation Breath Sounds / Voice Sounds] : lungs were clear to auscultation bilaterally [Heart Sounds] : normal S1 and S2 [Heart Sounds Gallop] : no gallops [Murmurs] : no murmurs [Heart Sounds Pericardial Friction Rub] : no pericardial rub [Involuntary Movements] : no involuntary movements were seen [Edema] : there was no peripheral edema [Oriented To Time, Place, And Person] : oriented to person, place, and time [Impaired Insight] : insight and judgment were intact [Affect] : the affect was normal [Bowel Sounds] : normal bowel sounds [Abdomen Soft] : soft [Abdomen Tenderness] : non-tender

## 2023-05-25 NOTE — PHYSICAL EXAM
[General Appearance - Alert] : alert [General Appearance - In No Acute Distress] : in no acute distress [General Appearance - Well Nourished] : well nourished [] : no respiratory distress [Neck Cervical Mass (___cm)] : no neck mass was observed [Respiration, Rhythm And Depth] : normal respiratory rhythm and effort [Exaggerated Use Of Accessory Muscles For Inspiration] : no accessory muscle use [Auscultation Breath Sounds / Voice Sounds] : lungs were clear to auscultation bilaterally [Heart Sounds] : normal S1 and S2 [Heart Sounds Gallop] : no gallops [Murmurs] : no murmurs [Heart Sounds Pericardial Friction Rub] : no pericardial rub [Involuntary Movements] : no involuntary movements were seen [Edema] : there was no peripheral edema [Oriented To Time, Place, And Person] : oriented to person, place, and time [Impaired Insight] : insight and judgment were intact [Affect] : the affect was normal [Bowel Sounds] : normal bowel sounds [Abdomen Soft] : soft [Abdomen Tenderness] : non-tender

## 2023-05-25 NOTE — ASSESSMENT
[FreeTextEntry1] : 74 y/o male with HIV, CKD( Baseline creatinine 1.5-2 mg/dl)  here for evaluation:\par \par CKD:( Baseline creatinine 1.5-2 mg/dl) \par Check blood and urine with his ID doc\par BP is a bit up occasionally: told him to measure BP at home and keep a log,  euvolemic on exam \par Renal sonogram done in 2022: simple renal cysts. No hydro, +echogenicity  \par \par HR well controlled\par Atrial Fibrillation: \par patient is on coumadin \par  \par HIV: management per ID\par \par \par F/U in 6  month\par \par (c) [phone]

## 2023-05-25 NOTE — REVIEW OF SYSTEMS
[Negative] : Genitourinary [As Noted in HPI] : as noted in HPI [Abdominal Pain] : abdominal pain [Heartburn] : heartburn [Fever] : no fever [Chills] : no chills [Eyesight Problems] : no eyesight problems [Nosebleeds] : no nosebleeds [Sore Throat] : no sore throat [Chest Pain] : no chest pain [Shortness Of Breath] : no shortness of breath [Cough] : no cough [Vomiting] : no vomiting [Constipation] : no constipation [Diarrhea] : no diarrhea [Dysuria] : no dysuria [Hesitancy] : no urinary hesitancy [Nocturia] : no nocturia [Joint Swelling] : no joint swelling [Joint Stiffness] : no joint stiffness [Itching] : no itching [Change In A Mole] : no change in a mole [Dizziness] : no dizziness [Fainting] : no fainting [Anxiety] : no anxiety [Depression] : no depression [Easy Bleeding] : no tendency for easy bleeding [Easy Bruising] : no tendency for easy bruising

## 2023-05-31 ENCOUNTER — APPOINTMENT (OUTPATIENT)
Dept: ORTHOPEDIC SURGERY | Facility: CLINIC | Age: 73
End: 2023-05-31
Payer: COMMERCIAL

## 2023-05-31 VITALS
DIASTOLIC BLOOD PRESSURE: 85 MMHG | BODY MASS INDEX: 25.71 KG/M2 | SYSTOLIC BLOOD PRESSURE: 164 MMHG | HEART RATE: 52 BPM | HEIGHT: 65 IN | WEIGHT: 154.32 LBS

## 2023-05-31 PROCEDURE — 99213 OFFICE O/P EST LOW 20 MIN: CPT

## 2023-05-31 PROCEDURE — 73030 X-RAY EXAM OF SHOULDER: CPT | Mod: RT

## 2023-05-31 NOTE — ADDENDUM
[FreeTextEntry1] : This note was written by Ximena Paul on 05/31/2023 acting solely as a scribe for Dr. Eddie Bonner.\par \par All medical record entries made by the Scribe were at my, Dr. Eddie Bonner, direction and personally dictated by me on 05/31/2023. I have personally reviewed the chart and agree that the record accurately reflects my personal performance of the history, physical exam, assessment and plan.

## 2023-05-31 NOTE — HISTORY OF PRESENT ILLNESS
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 Pain started after lifting a case of water bottle. He  developed bruising, pain and limited ROM.He has been taking Tylenol or pain.  He tore the right short head of proximal biceps last year  and was treated with PT was doing well until recently.  Patient is taking Coumadin because of Pacemaker. \par \par The patient's past medical history, past surgical history, medications and allergies were reviewed by me today with the patient and documented accordingly. In addition, the patient's family and social history, which were noncontributory to this visit, were reviewed also.

## 2023-05-31 NOTE — PHYSICAL EXAM
[de-identified] : Oriented to time, place, person\par Mood: Normal\par Affect: Normal\par Appearance: Healthy, well appearing, no acute distress.\par Gait: Normal\par Assistive Devices: None\par \par Right shoulder exam:\par \par Inspection: No malalignment,+Kameron defects, No atrophy, ecchymosis to the shoulder and biceps. +swelling anterior shoulder. \par Skin: No masses, No lesions\par Neck: Negative Spurling, full ROM, no pain with ROM\par AROM: FF to 60, abduction to 40, ER to 30, IR to hip\par Painful arc ROM: Pain with further motion. \par Tenderness: No bicipital tenderness, no tenderness to greater tuberosity/RTC insertion, + anterior shoulder/lesser tuberosity tenderness\par Strength: 4/5 ER, 5/5 IR in adduction, 4+/5 supraspinatus testing, negative [place]'s test\par AC joint: No TTP/pain with cross arm testing\par Biceps: Speed Negative, Yergason Negative \par Impingement test: + Boyd,+ Neer\par Vasc: 2+ radial pulse \par Stability: Stable \par Neuro: AIN, PIN, Ulnar nerve intact to motor\par Sensation: Intact to light touch throughout  [de-identified] : The following radiographs were ordered and read by me during this patients visit. I reviewed each radiograph in detail with the patient and discussed the findings as highlighted below.\par \par 3 views of right shoulder were obtained today, 05/31/2023, that show no acute fracture or dislocation. There is no glenohumeral and moderate AC joint degenerative change seen. Type III acromion. There is no significant malalignment. Calcified CA ligament.

## 2023-05-31 NOTE — DISCUSSION/SUMMARY
[de-identified] : 74 y/o male with right shoulder injury\par \par The patient presents with pain and weakness to the shoulder which is consistent with possible underlying rotator cuff pathology.  Discussed that pain is likely due from a inflammatory process within the shoulder.  A discussion was had with the patient regarding potential sources of inflammatory shoulder discomfort; including the rotator cuff tendon, subdeltoid/subacromial bursitis, acromioclavicular joint or long head of the biceps tendon. Irritation is typically caused either by overhead repetitive activity or as a result of injury.  We also discussed rotator cuff injuries may occur with acute trauma versus insidious failure due to chronic microtrauma or and overuse.  Patient has evidence of a calcified CA ligament, probably a long head biceps tendon rupture and likely underlying rotator cuff disease.\par \par Short-term and long-term outcomes as well as the goal of treatment to reduce pain and restore function were discussed. Nonsurgical treatment is typically first-line therapy that may take weeks to months to resolve symptoms; includes rest from overhead activities, NSAIDs, home exercise program versus physical therapy to restore normal strength/ROM/function of the shoulder, and possible corticosteroid injection.\par \par However given current presentation recommendation at this time would be for MRI imaging to further delineate degree of any internal structural derangement to the shoulder. This will allow for better understanding of the severity of disease, and allow for better stratification of treatment strategies (surgical vs. non-surgical). \par \par Recommendations: Rest from overhead activity, ice as needed to shoulder, NSAIDs as discussed.  MRI imaging.\par \par Followup: After MRI

## 2023-06-06 ENCOUNTER — RX RENEWAL (OUTPATIENT)
Age: 73
End: 2023-06-06

## 2023-06-09 ENCOUNTER — TRANSCRIPTION ENCOUNTER (OUTPATIENT)
Age: 73
End: 2023-06-09

## 2023-06-19 ENCOUNTER — APPOINTMENT (OUTPATIENT)
Dept: ORTHOPEDIC SURGERY | Facility: CLINIC | Age: 73
End: 2023-06-19
Payer: COMMERCIAL

## 2023-06-19 DIAGNOSIS — M67.911 UNSPECIFIED DISORDER OF SYNOVIUM AND TENDON, RIGHT SHOULDER: ICD-10-CM

## 2023-06-19 PROCEDURE — 99213 OFFICE O/P EST LOW 20 MIN: CPT

## 2023-06-21 ENCOUNTER — APPOINTMENT (OUTPATIENT)
Dept: PHARMACY | Facility: CLINIC | Age: 73
End: 2023-06-21
Payer: SELF-PAY

## 2023-06-21 PROCEDURE — V5299A: CUSTOM

## 2023-06-25 ENCOUNTER — FORM ENCOUNTER (OUTPATIENT)
Age: 73
End: 2023-06-25

## 2023-06-26 ENCOUNTER — APPOINTMENT (OUTPATIENT)
Dept: INFECTIOUS DISEASE | Facility: CLINIC | Age: 73
End: 2023-06-26
Payer: COMMERCIAL

## 2023-06-26 VITALS
OXYGEN SATURATION: 96 % | TEMPERATURE: 97.8 F | SYSTOLIC BLOOD PRESSURE: 110 MMHG | BODY MASS INDEX: 25.83 KG/M2 | WEIGHT: 155 LBS | HEIGHT: 65 IN | HEART RATE: 8 BPM | DIASTOLIC BLOOD PRESSURE: 64 MMHG

## 2023-06-26 PROCEDURE — 90619 MENACWY-TT VACCINE IM: CPT

## 2023-06-26 PROCEDURE — 99214 OFFICE O/P EST MOD 30 MIN: CPT | Mod: 25

## 2023-06-26 PROCEDURE — 90471 IMMUNIZATION ADMIN: CPT

## 2023-06-26 NOTE — PHYSICAL EXAM
[General Appearance - Alert] : alert [General Appearance - In No Acute Distress] : in no acute distress [Sclera] : the sclera and conjunctiva were normal [PERRL With Normal Accommodation] : pupils were equal in size, round, reactive to light [Extraocular Movements] : extraocular movements were intact [Outer Ear] : the ears and nose were normal in appearance [Oropharynx] : the oropharynx was normal with no thrush [Neck Appearance] : the appearance of the neck was normal [Neck Cervical Mass (___cm)] : no neck mass was observed [Jugular Venous Distention Increased] : there was no jugular-venous distention [Thyroid Diffuse Enlargement] : the thyroid was not enlarged [Auscultation Breath Sounds / Voice Sounds] : lungs were clear to auscultation bilaterally [FreeTextEntry1] : Few rhonchi at bases [Heart Rate And Rhythm] : heart rate was normal and rhythm regular [Heart Sounds] : normal S1 and S2 [Heart Sounds Gallop] : no gallops [Murmurs] : no murmurs [Heart Sounds Pericardial Friction Rub] : no pericardial rub [Full Pulse] : the pedal pulses are present [Edema] : there was no peripheral edema [Bowel Sounds] : normal bowel sounds [Abdomen Soft] : soft [Abdomen Tenderness] : non-tender [Abdomen Mass (___ Cm)] : no abdominal mass palpated [Costovertebral Angle Tenderness] : no CVA tenderness [No Palpable Adenopathy] : no palpable adenopathy [Musculoskeletal - Swelling] : no joint swelling [Nail Clubbing] : no clubbing  or cyanosis of the fingernails [Motor Tone] : muscle strength and tone were normal [Skin Color & Pigmentation] : normal skin color and pigmentation [] : no rash [Deep Tendon Reflexes (DTR)] : deep tendon reflexes were 2+ and symmetric [Sensation] : the sensory exam was normal to light touch and pinprick [No Focal Deficits] : no focal deficits [Oriented To Time, Place, And Person] : oriented to person, place, and time [Affect] : the affect was normal

## 2023-06-26 NOTE — ASSESSMENT
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 could use Biktarvy if needs to stop RTV for steroid injection \par Past meds : ABC/rtv/IDV, then, ABC/3TC, RTV/IDV (renal issues), D4T/#TC/ABC/rtv/SQV, ABC/3TC/NVP (M184V and 181C), ABC/AZT, rtv/SQV/EFV, EPZ/rtv/GERA then Trizivir/rtv/DUSTY, then rtv/DRV/DTG.\par If switch for shot would impact coumadin, atorvastatin, \par Continue current meds\par Check CD4, VL, CBC, SMA, INR and Cystatin C GFR\par Had some proteinuria, check U/A, Uprotein \par Gout- check uric acid on Iloric\par Check INR on coumadin and TSH/T4 on levothyroxine\par Vaccines, will get bivalent COVID at his hospital, flu shot today. Completed pneumovax, meningococcal\par Colonoscopy, 2017 due for F/U (+FH)\par Changed PM battery- MRI compatible, but 1 lead is NOT\par Dental: earlier this year, no issues\par Sees endocrinologist, on levothyroxine, check TFTs\par Gout check uric acid\par Afib/Flutter- on coumadin, check INR\par Osteopenia on DEXA 2019, H/O hypoparthyroidism, DEXA normal 2022\par Optho: Due for exam this year.\par

## 2023-06-26 NOTE — HISTORY OF PRESENT ILLNESS
[Monogamous] : monogamous [FreeTextEntry1] : HIV on daily Norvir/Prezista and Tivicay\par Takes daily in the evening after dinner\par Tolerating well\par No missed doses this week\par \par Went to [place]\par Has been well\par Having right shoulder pain\par To get sonogram

## 2023-06-27 ENCOUNTER — APPOINTMENT (OUTPATIENT)
Dept: ULTRASOUND IMAGING | Facility: CLINIC | Age: 73
End: 2023-06-27
Payer: COMMERCIAL

## 2023-06-27 ENCOUNTER — OUTPATIENT (OUTPATIENT)
Dept: OUTPATIENT SERVICES | Facility: HOSPITAL | Age: 73
LOS: 1 days | End: 2023-06-27
Payer: COMMERCIAL

## 2023-06-27 DIAGNOSIS — Z00.8 ENCOUNTER FOR OTHER GENERAL EXAMINATION: ICD-10-CM

## 2023-06-27 DIAGNOSIS — Z90.49 ACQUIRED ABSENCE OF OTHER SPECIFIED PARTS OF DIGESTIVE TRACT: Chronic | ICD-10-CM

## 2023-06-27 PROCEDURE — 76882 US LMTD JT/FCL EVL NVASC XTR: CPT

## 2023-06-27 PROCEDURE — 76882 US LMTD JT/FCL EVL NVASC XTR: CPT | Mod: 26,RT

## 2023-06-29 ENCOUNTER — LABORATORY RESULT (OUTPATIENT)
Age: 73
End: 2023-06-29

## 2023-06-29 LAB
25(OH)D3 SERPL-MCNC: 31.1 NG/ML
ALBUMIN SERPL ELPH-MCNC: 4.3 G/DL
ALP BLD-CCNC: 37 U/L
ALT SERPL-CCNC: 16 U/L
ANION GAP SERPL CALC-SCNC: 14 MMOL/L
APPEARANCE: CLEAR
AST SERPL-CCNC: 25 U/L
BACTERIA: NEGATIVE /HPF
BILIRUB SERPL-MCNC: 0.5 MG/DL
BILIRUBIN URINE: NEGATIVE
BLOOD URINE: ABNORMAL
BUN SERPL-MCNC: 32 MG/DL
CALCIUM SERPL-MCNC: 9.7 MG/DL
CALCIUM SERPL-MCNC: 9.8 MG/DL
CAST: 1 /LPF
CD3 CELLS # BLD: 1068 CELLS/UL
CD3 CELLS NFR BLD: 66 %
CD3+CD4+ CELLS # BLD: 451 CELLS/UL
CD3+CD4+ CELLS NFR BLD: 28 %
CD3+CD4+ CELLS/CD3+CD8+ CLL SPEC: 0.76 RATIO
CD3+CD8+ CELLS # SPEC: 591 CELLS/UL
CD3+CD8+ CELLS NFR BLD: 36 %
CHLORIDE SERPL-SCNC: 106 MMOL/L
CHOLEST SERPL-MCNC: 213 MG/DL
CO2 SERPL-SCNC: 23 MMOL/L
COLOR: YELLOW
CREAT SERPL-MCNC: 1.78 MG/DL
CREAT SPEC-SCNC: 134 MG/DL
CREAT/PROT UR: 0.8 RATIO
CYSTATIN C SERPL-MCNC: 1.68 MG/L
EGFR: 40 ML/MIN/1.73M2
EPITHELIAL CELLS: 1 /HPF
ESTIMATED AVERAGE GLUCOSE: 134 MG/DL
GFR/BSA.PRED SERPLBLD CYS-BASED-ARV: 37 ML/MIN/1.73M2
GLUCOSE QUALITATIVE U: NEGATIVE MG/DL
GLUCOSE SERPL-MCNC: 106 MG/DL
HBA1C MFR BLD HPLC: 6.3 %
HCV AB SER QL: NONREACTIVE
HCV S/CO RATIO: 0.13 S/CO
HDLC SERPL-MCNC: 43 MG/DL
INR PPP: 3.49 RATIO
KETONES URINE: NEGATIVE MG/DL
LDLC SERPL CALC-MCNC: 125 MG/DL
LEUKOCYTE ESTERASE URINE: NEGATIVE
MICROSCOPIC-UA: NORMAL
NITRITE URINE: NEGATIVE
NONHDLC SERPL-MCNC: 169 MG/DL
PARATHYROID HORMONE INTACT: 20 PG/ML
PH URINE: 6
POTASSIUM SERPL-SCNC: 4.6 MMOL/L
PROT SERPL-MCNC: 7.3 G/DL
PROT UR-MCNC: 104 MG/DL
PROTEIN URINE: 100 MG/DL
PSA SERPL-MCNC: 0.67 NG/ML
PT BLD: 41 SEC
RED BLOOD CELLS URINE: 1 /HPF
SODIUM SERPL-SCNC: 143 MMOL/L
SPECIFIC GRAVITY URINE: 1.02
T4 FREE SERPL-MCNC: 1.9 NG/DL
TRIGL SERPL-MCNC: 221 MG/DL
TSH SERPL-ACNC: 2.11 UIU/ML
UROBILINOGEN URINE: 0.2 MG/DL
WHITE BLOOD CELLS URINE: 0 /HPF

## 2023-06-30 ENCOUNTER — NON-APPOINTMENT (OUTPATIENT)
Age: 73
End: 2023-06-30

## 2023-06-30 LAB
HIV1 RNA # SERPL NAA+PROBE: NORMAL
HIV1 RNA # SERPL NAA+PROBE: NORMAL COPIES/ML
VIRAL LOAD INTERP: NORMAL
VIRAL LOAD LOG: NORMAL LG COP/ML

## 2023-07-05 LAB
M TB IFN-G BLD-IMP: NEGATIVE
QUANTIFERON TB PLUS MITOGEN MINUS NIL: >10 IU/ML
QUANTIFERON TB PLUS NIL: 0.07 IU/ML
QUANTIFERON TB PLUS TB1 MINUS NIL: -0.01 IU/ML
QUANTIFERON TB PLUS TB2 MINUS NIL: 0.02 IU/ML

## 2023-07-06 ENCOUNTER — FORM ENCOUNTER (OUTPATIENT)
Age: 73
End: 2023-07-06

## 2023-07-06 PROBLEM — M67.911 DISORDER OF RIGHT ROTATOR CUFF: Status: ACTIVE | Noted: 2023-05-31

## 2023-07-06 NOTE — HISTORY OF PRESENT ILLNESS
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 Patient was unable to get MRI as his pacemaker is not MRI compatible. He has been exercising on his own and notes improvement of pain and function. He is not taking pain medication.  He tore the right short head of proximal biceps last year  and was treated with PT was doing well until recently.  Patient is taking Coumadin because of Pacemaker.

## 2023-07-06 NOTE — PHYSICAL EXAM
[de-identified] : Oriented to time, place, person\par Mood: Normal\par Affect: Normal\par Appearance: Healthy, well appearing, no acute distress.\par Gait: Normal\par Assistive Devices: None\par \par Right shoulder exam:\par \par Inspection: No malalignment,+Kameron defects, No atrophy, ecchymosis to the shoulder and biceps. +swelling anterior shoulder. \par Skin: No masses, No lesions\par Neck: Negative Spurling, full ROM, no pain with ROM\par AROM: FF to 160, abduction to 70, ER to 30, IR to hip\par Painful arc ROM: Pain with further motion. \par Tenderness: No bicipital tenderness, no tenderness to greater tuberosity/RTC insertion, + anterior shoulder/lesser tuberosity tenderness\par Strength: 4/5 ER, 5/5 IR in adduction, 4+/5 supraspinatus testing, negative [place]'s test\par AC joint: No TTP/pain with cross arm testing\par Biceps: Speed Negative, Yergason Negative \par Impingement test: + Boyd,+ Neer\par Vasc: 2+ radial pulse \par Stability: Stable \par Neuro: AIN, PIN, Ulnar nerve intact to motor\par Sensation: Intact to light touch throughout  [de-identified] : 3 views of right shoulder were obtained 05/31/2023, that show no acute fracture or dislocation. There is no glenohumeral and moderate AC joint degenerative change seen. Type III acromion. There is no significant malalignment. Calcified CA ligament.

## 2023-07-06 NOTE — ADDENDUM
[FreeTextEntry1] : This note was written by iXmena Paul on 06/19/2023 acting solely as a scribe for Dr. Eddie Bonner.\par \par All medical record entries made by the Scribe were at my, Dr. Eddie Bonner, direction and personally dictated by me on 06/19/2023. I have personally reviewed the chart and agree that the record accurately reflects my personal performance of the history, physical exam, assessment and plan.

## 2023-07-06 NOTE — DISCUSSION/SUMMARY
[de-identified] : 74 y/o male with right shoulder injury\par \par The patient presents with pain and weakness to the shoulder which is consistent with possible underlying rotator cuff pathology.  Discussed concern for underlying injury particularly with a calcified CA ligament, evidence of long head biceps tendon rupture. \par \par After discussion with his cardiologist, he is unable to obtain MRI given pacemaker. Given current presentation recommendation would be for CT arthrogram vs. US to further delineate degree of any internal structural derangement to the shoulder, and to evaluate for rotator cuff injury\par \par Recommendations: Rest from overhead activity, ice as needed to shoulder, NSAIDs as discussed.  CT arthrogram vs US.\par \par Followup after imaging

## 2023-07-07 ENCOUNTER — APPOINTMENT (OUTPATIENT)
Dept: INFECTIOUS DISEASE | Facility: CLINIC | Age: 73
End: 2023-07-07
Payer: COMMERCIAL

## 2023-07-07 PROCEDURE — 98967 PH1 ASSMT&MGMT NQHP 11-20: CPT

## 2023-07-12 ENCOUNTER — APPOINTMENT (OUTPATIENT)
Dept: ORTHOPEDIC SURGERY | Facility: CLINIC | Age: 73
End: 2023-07-12

## 2023-08-21 ENCOUNTER — APPOINTMENT (OUTPATIENT)
Dept: INFECTIOUS DISEASE | Facility: CLINIC | Age: 73
End: 2023-08-21
Payer: COMMERCIAL

## 2023-08-21 DIAGNOSIS — Z00.00 ENCOUNTER FOR GENERAL ADULT MEDICAL EXAMINATION W/OUT ABNORMAL FINDINGS: ICD-10-CM

## 2023-08-21 PROCEDURE — 90471 IMMUNIZATION ADMIN: CPT

## 2023-08-21 PROCEDURE — 90619 MENACWY-TT VACCINE IM: CPT

## 2023-10-03 ENCOUNTER — APPOINTMENT (OUTPATIENT)
Dept: CARDIOLOGY | Facility: CLINIC | Age: 73
End: 2023-10-03
Payer: COMMERCIAL

## 2023-10-03 VITALS
OXYGEN SATURATION: 98 % | BODY MASS INDEX: 25.83 KG/M2 | HEART RATE: 72 BPM | SYSTOLIC BLOOD PRESSURE: 132 MMHG | HEIGHT: 65 IN | DIASTOLIC BLOOD PRESSURE: 80 MMHG | WEIGHT: 155 LBS

## 2023-10-03 PROCEDURE — 99215 OFFICE O/P EST HI 40 MIN: CPT | Mod: 25

## 2023-10-03 PROCEDURE — 93000 ELECTROCARDIOGRAM COMPLETE: CPT

## 2023-10-12 ENCOUNTER — APPOINTMENT (OUTPATIENT)
Dept: CV DIAGNOSTICS | Facility: HOSPITAL | Age: 73
End: 2023-10-12

## 2023-10-12 ENCOUNTER — OUTPATIENT (OUTPATIENT)
Dept: OUTPATIENT SERVICES | Facility: HOSPITAL | Age: 73
LOS: 1 days | End: 2023-10-12
Payer: COMMERCIAL

## 2023-10-12 DIAGNOSIS — I70.90 UNSPECIFIED ATHEROSCLEROSIS: ICD-10-CM

## 2023-10-12 DIAGNOSIS — Z90.49 ACQUIRED ABSENCE OF OTHER SPECIFIED PARTS OF DIGESTIVE TRACT: Chronic | ICD-10-CM

## 2023-10-12 PROCEDURE — 78452 HT MUSCLE IMAGE SPECT MULT: CPT | Mod: 26,MH

## 2023-10-12 PROCEDURE — A9500: CPT

## 2023-10-12 PROCEDURE — 93017 CV STRESS TEST TRACING ONLY: CPT

## 2023-10-12 PROCEDURE — 93018 CV STRESS TEST I&R ONLY: CPT | Mod: MH

## 2023-10-12 PROCEDURE — 93016 CV STRESS TEST SUPVJ ONLY: CPT | Mod: MH

## 2023-10-12 PROCEDURE — 78452 HT MUSCLE IMAGE SPECT MULT: CPT | Mod: MH

## 2023-10-26 ENCOUNTER — TRANSCRIPTION ENCOUNTER (OUTPATIENT)
Age: 73
End: 2023-10-26

## 2023-11-13 ENCOUNTER — APPOINTMENT (OUTPATIENT)
Dept: NEPHROLOGY | Facility: CLINIC | Age: 73
End: 2023-11-13
Payer: COMMERCIAL

## 2023-11-13 VITALS
TEMPERATURE: 97.7 F | HEART RATE: 69 BPM | WEIGHT: 152.12 LBS | DIASTOLIC BLOOD PRESSURE: 66 MMHG | BODY MASS INDEX: 25.34 KG/M2 | SYSTOLIC BLOOD PRESSURE: 107 MMHG | OXYGEN SATURATION: 96 % | HEIGHT: 65 IN

## 2023-11-13 PROCEDURE — 99213 OFFICE O/P EST LOW 20 MIN: CPT

## 2023-11-22 ENCOUNTER — RX RENEWAL (OUTPATIENT)
Age: 73
End: 2023-11-22

## 2023-12-18 ENCOUNTER — APPOINTMENT (OUTPATIENT)
Dept: INFECTIOUS DISEASE | Facility: CLINIC | Age: 73
End: 2023-12-18
Payer: COMMERCIAL

## 2023-12-18 VITALS
WEIGHT: 160 LBS | DIASTOLIC BLOOD PRESSURE: 78 MMHG | BODY MASS INDEX: 26.66 KG/M2 | OXYGEN SATURATION: 96 % | HEART RATE: 64 BPM | HEIGHT: 65 IN | TEMPERATURE: 97.9 F | SYSTOLIC BLOOD PRESSURE: 135 MMHG

## 2023-12-18 DIAGNOSIS — Z87.39 PERSONAL HISTORY OF OTHER DISEASES OF THE MUSCULOSKELETAL SYSTEM AND CONNECTIVE TISSUE: ICD-10-CM

## 2023-12-18 DIAGNOSIS — N18.9 CHRONIC KIDNEY DISEASE, UNSPECIFIED: ICD-10-CM

## 2023-12-18 PROCEDURE — 99213 OFFICE O/P EST LOW 20 MIN: CPT

## 2023-12-18 NOTE — HISTORY OF PRESENT ILLNESS
[FreeTextEntry1] : HIV on daily Norvir/Prezista and Tivicay\par  Takes daily in the evening after dinner\par  Tolerating well\par  No missed doses this week\par  \par  Went to [place]\par  Has been well\par  Having right shoulder pain\par  To get sonogram [Monogamous] : monogamous

## 2023-12-18 NOTE — ASSESSMENT
[FreeTextEntry1] : HIV on daily Norvir/Prezista/Tivicay Tolerating well, continue current med Labs from June 2023 reviewed Did not need intraarticular steroids, responded to PT CAD- had EST= no change in meds Vaccines: UTD- will get COVID on his own Optho: has appt New 3 with Dr Garcia Dental: UTD- at [facility] 2 months ago Colonoscopy: had EGD last year- due for Colonoscopy with Dr Velez Urology: Nov -sees annual CKD- sees Dr Villafana 2X annual Check CD4, VL, CBC, SMA, Cystatin C GFR, U prot, INR, Lipids, HgA1c, Urica acid, TSH/T4 Med reconciliation

## 2023-12-22 LAB
ALBUMIN SERPL ELPH-MCNC: 4.2 G/DL
ALP BLD-CCNC: 42 U/L
ALT SERPL-CCNC: 14 U/L
ANION GAP SERPL CALC-SCNC: 14 MMOL/L
APPEARANCE: CLEAR
AST SERPL-CCNC: 19 U/L
BACTERIA: NEGATIVE /HPF
BILIRUB SERPL-MCNC: 0.5 MG/DL
BILIRUBIN URINE: NEGATIVE
BLOOD URINE: ABNORMAL
BUN SERPL-MCNC: 34 MG/DL
CALCIUM SERPL-MCNC: 9.7 MG/DL
CALCIUM SERPL-MCNC: 9.7 MG/DL
CAST: 1 /LPF
CD3 CELLS # BLD: 1213 CELLS/UL
CD3 CELLS NFR BLD: 64 %
CD3+CD4+ CELLS # BLD: 548 CELLS/UL
CD3+CD4+ CELLS NFR BLD: 29 %
CD3+CD4+ CELLS/CD3+CD8+ CLL SPEC: 0.87 RATIO
CD3+CD8+ CELLS # SPEC: 630 CELLS/UL
CD3+CD8+ CELLS NFR BLD: 33 %
CHLORIDE SERPL-SCNC: 101 MMOL/L
CHOLEST SERPL-MCNC: 211 MG/DL
CO2 SERPL-SCNC: 23 MMOL/L
COLOR: YELLOW
CREAT SERPL-MCNC: 1.84 MG/DL
CREAT SPEC-SCNC: 72 MG/DL
CREAT/PROT UR: 1.6 RATIO
CYSTATIN C SERPL-MCNC: 1.7 MG/L
EGFR: 38 ML/MIN/1.73M2
EPITHELIAL CELLS: 0 /HPF
ESTIMATED AVERAGE GLUCOSE: 123 MG/DL
GFR/BSA.PRED SERPLBLD CYS-BASED-ARV: 36 ML/MIN/1.73M2
GLUCOSE QUALITATIVE U: NEGATIVE MG/DL
GLUCOSE SERPL-MCNC: 104 MG/DL
HBA1C MFR BLD HPLC: 5.9 %
HCT VFR BLD CALC: 52.3 %
HDLC SERPL-MCNC: 42 MG/DL
HGB BLD-MCNC: 16.6 G/DL
INR PPP: 2.87 RATIO
KETONES URINE: NEGATIVE MG/DL
LDLC SERPL CALC-MCNC: 112 MG/DL
LEUKOCYTE ESTERASE URINE: NEGATIVE
MCHC RBC-ENTMCNC: 29.2 PG
MCHC RBC-ENTMCNC: 31.7 GM/DL
MCV RBC AUTO: 92.1 FL
MICROSCOPIC-UA: NORMAL
NITRITE URINE: NEGATIVE
NONHDLC SERPL-MCNC: 169 MG/DL
PARATHYROID HORMONE INTACT: 20 PG/ML
PH URINE: 6.5
PLATELET # BLD AUTO: 231 K/UL
POTASSIUM SERPL-SCNC: 4.3 MMOL/L
PROT SERPL-MCNC: 7.4 G/DL
PROT UR-MCNC: 115 MG/DL
PROTEIN URINE: 100 MG/DL
PT BLD: 31.5 SEC
RBC # BLD: 5.68 M/UL
RBC # FLD: 13.3 %
RED BLOOD CELLS URINE: 0 /HPF
SODIUM SERPL-SCNC: 138 MMOL/L
SPECIFIC GRAVITY URINE: 1.01
T4 FREE SERPL-MCNC: 1.5 NG/DL
TRIGL SERPL-MCNC: 329 MG/DL
TSH SERPL-ACNC: 3.81 UIU/ML
URATE SERPL-MCNC: 5.7 MG/DL
UROBILINOGEN URINE: 0.2 MG/DL
WBC # FLD AUTO: 6 K/UL
WHITE BLOOD CELLS URINE: 0 /HPF

## 2024-01-01 NOTE — HISTORY OF PRESENT ILLNESS
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 No palpitations No bleeding on Coumadin

## 2024-01-01 NOTE — PHYSICAL EXAM
[Well Developed] : well developed [Well Nourished] : well nourished [No Acute Distress] : no acute distress [Normal Conjunctiva] : normal conjunctiva [Normal Venous Pressure] : normal venous pressure [No Carotid Bruit] : no carotid bruit [Normal S1, S2] : normal S1, S2 [No Murmur] : no murmur [No Rub] : no rub [No Gallop] : no gallop [Good Air Entry] : good air entry [Clear Lung Fields] : clear lung fields [No Respiratory Distress] : no respiratory distress  [Soft] : abdomen soft [Non Tender] : non-tender [No Masses/organomegaly] : no masses/organomegaly [Normal Bowel Sounds] : normal bowel sounds [Normal Gait] : normal gait [No Edema] : no edema [No Cyanosis] : no cyanosis [No Varicosities] : no varicosities [No Clubbing] : no clubbing [No Skin Lesions] : no skin lesions [No Rash] : no rash [Moves all extremities] : moves all extremities [No Focal Deficits] : no focal deficits [Normal Speech] : normal speech [Alert and Oriented] : alert and oriented [Normal memory] : normal memory

## 2024-01-01 NOTE — DISCUSSION/SUMMARY
[EKG obtained to assist in diagnosis and management of assessed problem(s)] : EKG obtained to assist in diagnosis and management of assessed problem(s) [FreeTextEntry1] : 74 y/o with HIV, CAD, AF, HTN, Hchol  Recc stress test to eval for disease progression

## 2024-01-03 ENCOUNTER — APPOINTMENT (OUTPATIENT)
Dept: OPHTHALMOLOGY | Facility: CLINIC | Age: 74
End: 2024-01-03
Payer: COMMERCIAL

## 2024-01-03 ENCOUNTER — NON-APPOINTMENT (OUTPATIENT)
Age: 74
End: 2024-01-03

## 2024-01-03 PROCEDURE — 92004 COMPRE OPH EXAM NEW PT 1/>: CPT

## 2024-01-03 PROCEDURE — 92134 CPTRZ OPH DX IMG PST SGM RTA: CPT

## 2024-02-05 ENCOUNTER — APPOINTMENT (OUTPATIENT)
Dept: ELECTROPHYSIOLOGY | Facility: CLINIC | Age: 74
End: 2024-02-05

## 2024-04-02 ENCOUNTER — APPOINTMENT (OUTPATIENT)
Dept: CARDIOLOGY | Facility: CLINIC | Age: 74
End: 2024-04-02
Payer: COMMERCIAL

## 2024-04-02 VITALS
HEART RATE: 65 BPM | BODY MASS INDEX: 25.83 KG/M2 | OXYGEN SATURATION: 100 % | SYSTOLIC BLOOD PRESSURE: 166 MMHG | WEIGHT: 155 LBS | HEIGHT: 65 IN | DIASTOLIC BLOOD PRESSURE: 82 MMHG

## 2024-04-02 DIAGNOSIS — I70.90 UNSPECIFIED ATHEROSCLEROSIS: ICD-10-CM

## 2024-04-02 DIAGNOSIS — I48.91 UNSPECIFIED ATRIAL FIBRILLATION: ICD-10-CM

## 2024-04-02 PROCEDURE — 93000 ELECTROCARDIOGRAM COMPLETE: CPT

## 2024-04-02 PROCEDURE — 99214 OFFICE O/P EST MOD 30 MIN: CPT | Mod: 25

## 2024-04-06 NOTE — PHYSICAL EXAM

## 2024-04-06 NOTE — HISTORY OF PRESENT ILLNESS
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 No palpitations No bleeding on Coumadin Was recently in [place] and did lots of walking and felt fine

## 2024-04-06 NOTE — CARDIOLOGY SUMMARY
[de-identified] : 4/2/24 Sinus Rhythm  -Left atrial enlargement. -Negative precordial T-waves -May be normal -consider anteroseptal ischemia.  BORDERLINE

## 2024-05-02 ENCOUNTER — RX RENEWAL (OUTPATIENT)
Age: 74
End: 2024-05-02

## 2024-05-02 RX ORDER — DOLUTEGRAVIR SODIUM 50 MG/1
50 TABLET, FILM COATED ORAL
Qty: 90 | Refills: 0 | Status: ACTIVE | COMMUNITY
Start: 2018-12-10 | End: 1900-01-01

## 2024-05-15 RX ORDER — DARUNAVIR 800 MG 800 MG/1
800 TABLET ORAL
Qty: 90 | Refills: 0 | Status: ACTIVE | COMMUNITY
Start: 2018-04-23 | End: 1900-01-01

## 2024-06-05 NOTE — PHYSICAL EXAM
[General Appearance - Alert] : alert [General Appearance - In No Acute Distress] : in no acute distress [General Appearance - Well Nourished] : well nourished [Sclera] : the sclera and conjunctiva were normal [Examination Of The Oral Cavity] : the lips and gums were normal [Oropharynx] : the oropharynx was normal [Neck Appearance] : the appearance of the neck was normal [Neck Cervical Mass (___cm)] : no neck mass was observed [] : no respiratory distress [Respiration, Rhythm And Depth] : normal respiratory rhythm and effort [Exaggerated Use Of Accessory Muscles For Inspiration] : no accessory muscle use [Auscultation Breath Sounds / Voice Sounds] : lungs were clear to auscultation bilaterally [Heart Sounds] : normal S1 and S2 [Heart Sounds Gallop] : no gallops [Murmurs] : no murmurs [Heart Sounds Pericardial Friction Rub] : no pericardial rub [Edema] : there was no peripheral edema [Bowel Sounds] : normal bowel sounds [Abdomen Tenderness] : non-tender [No CVA Tenderness] : no ~M costovertebral angle tenderness [Involuntary Movements] : no involuntary movements were seen [Oriented To Time, Place, And Person] : oriented to person, place, and time [Impaired Insight] : insight and judgment were intact [Affect] : the affect was normal

## 2024-06-06 ENCOUNTER — APPOINTMENT (OUTPATIENT)
Dept: NEPHROLOGY | Facility: CLINIC | Age: 74
End: 2024-06-06
Payer: COMMERCIAL

## 2024-06-06 VITALS
SYSTOLIC BLOOD PRESSURE: 121 MMHG | HEART RATE: 63 BPM | DIASTOLIC BLOOD PRESSURE: 68 MMHG | WEIGHT: 155.42 LBS | OXYGEN SATURATION: 97 % | TEMPERATURE: 97.3 F | HEIGHT: 65 IN | BODY MASS INDEX: 25.9 KG/M2

## 2024-06-06 DIAGNOSIS — R03.0 ELEVATED BLOOD-PRESSURE READING, W/OUT DIAGNOSIS OF HYPERTENSION: ICD-10-CM

## 2024-06-06 LAB
APPEARANCE: CLEAR
BACTERIA: NEGATIVE /HPF
BILIRUBIN URINE: ABNORMAL
BLOOD URINE: NEGATIVE
CAST: 11 /LPF
COLOR: NORMAL
CREAT SPEC-SCNC: 232 MG/DL
CREAT SPEC-SCNC: 232 MG/DL
CREAT/PROT UR: 0.2 RATIO
EPITHELIAL CELLS: 3 /HPF
GLUCOSE QUALITATIVE U: 100 MG/DL
HYALINE CASTS: PRESENT
KETONES URINE: ABNORMAL MG/DL
LEUKOCYTE ESTERASE URINE: NEGATIVE
MICROALBUMIN 24H UR DL<=1MG/L-MCNC: 19.8 MG/DL
MICROALBUMIN/CREAT 24H UR-RTO: 85 MG/G
MICROSCOPIC-UA: NORMAL
NITRITE URINE: NEGATIVE
PH URINE: 5.5
PROT UR-MCNC: 45 MG/DL
PROTEIN URINE: 30 MG/DL
RED BLOOD CELLS URINE: 1 /HPF
REVIEW: NORMAL
SPECIFIC GRAVITY URINE: 1.02
UROBILINOGEN URINE: 0.2 MG/DL
WHITE BLOOD CELLS URINE: 0 /HPF

## 2024-06-06 PROCEDURE — 99213 OFFICE O/P EST LOW 20 MIN: CPT

## 2024-06-06 NOTE — HISTORY OF PRESENT ILLNESS
[FreeTextEntry1] : Mr Watkins is a 73 y/o male with H/O  HIV ( > 20 years)  on tivicay instead of  descovy, which was  changed in dec 2018,  CKD (10 years, with baseline creatinine of 1.6- 2 mg/dl ) Tooth aspiration s/p bronchoscopy for removal ( Jan 2019: creat on admission 1.7 and 2.1 mg/dl on discharge), Bicep tear , Covid treated with paxlovid ( 7/2022) . Had EGD (1/2023): +Gastritis. On Famotidine. + Pacemaker ( Battery replaced in Feb/2023)   Had a stress test in Oct2023: No ischemia noted. EF 56%   Comes for a follow up visit today. Feels pretty good. Denies any lower extremity edema, SOB. No nausea or vomiting.  No recent infections or hospitalizations. Colonoscopy prep was not good so he needs repeat .  Was noted to have elevated BP at the cardiologist office

## 2024-06-06 NOTE — REVIEW OF SYSTEMS
[As Noted in HPI] : as noted in HPI [Fever] : no fever [Chills] : no chills [Recent Weight Gain (___ Lbs)] : no recent weight gain [Recent Weight Loss (___ Lbs)] : no recent weight loss [Eyesight Problems] : no eyesight problems [Nosebleeds] : no nosebleeds [Sore Throat] : no sore throat [Chest Pain] : no chest pain [Shortness Of Breath] : no shortness of breath [Cough] : no cough [Abdominal Pain] : no abdominal pain [Vomiting] : no vomiting [Constipation] : no constipation [Diarrhea] : no diarrhea [Heartburn] : no heartburn [Dysuria] : no dysuria [Hesitancy] : no urinary hesitancy [Nocturia] : no nocturia [Joint Swelling] : no joint swelling [Joint Stiffness] : no joint stiffness [Itching] : no itching [Change In A Mole] : no change in a mole [Dizziness] : no dizziness [Fainting] : no fainting [Anxiety] : no anxiety [Depression] : no depression [Easy Bleeding] : no tendency for easy bleeding [Easy Bruising] : no tendency for easy bruising [Negative] : Integumentary

## 2024-06-06 NOTE — ASSESSMENT
[FreeTextEntry1] : 73 y/o male with HIV, CKD( Baseline creatinine 1.5-2 mg/dl) here for evaluation:  CKD:( Baseline creatinine 1.5-2 mg/dl) Kidney function has been stable and electrolytes in range Ur P/C elevated at 1.6 gms in Dec. Repeat today in the normal range with presence of microalbuminuria ( 85 mg) U/A with glucose and ketone. ?significance. Repeat at the ID office  BP now in the normal range, euvolemic on exam Renal sonogram done in 2022: simple renal cysts. No hydro, +echogenicity  Atrial Fibrillation: HR well controlled patient is on coumadin  HIV: management per ID      F/U in 6 month    (c) [phone].

## 2024-06-24 ENCOUNTER — APPOINTMENT (OUTPATIENT)
Dept: INFECTIOUS DISEASE | Facility: CLINIC | Age: 74
End: 2024-06-24
Payer: COMMERCIAL

## 2024-06-24 ENCOUNTER — NON-APPOINTMENT (OUTPATIENT)
Age: 74
End: 2024-06-24

## 2024-06-24 VITALS
RESPIRATION RATE: 14 BRPM | SYSTOLIC BLOOD PRESSURE: 154 MMHG | WEIGHT: 156 LBS | BODY MASS INDEX: 25.99 KG/M2 | DIASTOLIC BLOOD PRESSURE: 79 MMHG | HEART RATE: 74 BPM | TEMPERATURE: 97.9 F | HEIGHT: 65 IN | OXYGEN SATURATION: 99 %

## 2024-06-24 DIAGNOSIS — E78.5 HYPERLIPIDEMIA, UNSPECIFIED: ICD-10-CM

## 2024-06-24 DIAGNOSIS — N18.30 CHRONIC KIDNEY DISEASE, STAGE 3 UNSPECIFIED: ICD-10-CM

## 2024-06-24 DIAGNOSIS — Z01.00 ENCOUNTER FOR EXAMINATION OF EYES AND VISION W/OUT ABNORMAL FINDINGS: ICD-10-CM

## 2024-06-24 DIAGNOSIS — R73.03 PREDIABETES.: ICD-10-CM

## 2024-06-24 DIAGNOSIS — Z01.20 ENCOUNTER FOR DENTAL EXAMINATION AND CLEANING W/OUT ABNORMAL FINDINGS: ICD-10-CM

## 2024-06-24 DIAGNOSIS — B20 HUMAN IMMUNODEFICIENCY VIRUS [HIV] DISEASE: ICD-10-CM

## 2024-06-24 PROCEDURE — 99214 OFFICE O/P EST MOD 30 MIN: CPT

## 2024-06-27 LAB
25(OH)D3 SERPL-MCNC: 36.4 NG/ML
ALBUMIN SERPL ELPH-MCNC: 4.5 G/DL
ALP BLD-CCNC: 38 U/L
ALT SERPL-CCNC: 13 U/L
ANION GAP SERPL CALC-SCNC: 14 MMOL/L
APPEARANCE: CLEAR
AST SERPL-CCNC: 20 U/L
BACTERIA: NEGATIVE /HPF
BILIRUB SERPL-MCNC: 0.4 MG/DL
BILIRUBIN URINE: NEGATIVE
BLOOD URINE: NEGATIVE
BUN SERPL-MCNC: 38 MG/DL
CALCIUM SERPL-MCNC: 9.8 MG/DL
CAST: 0 /LPF
CD3 CELLS # BLD: 1271 CELLS/UL
CD3 CELLS NFR BLD: 71 %
CD3+CD4+ CELLS # BLD: 678 CELLS/UL
CD3+CD4+ CELLS NFR BLD: 38 %
CD3+CD4+ CELLS/CD3+CD8+ CLL SPEC: 1.19 RATIO
CD3+CD8+ CELLS # SPEC: 572 CELLS/UL
CD3+CD8+ CELLS NFR BLD: 32 %
CHLORIDE SERPL-SCNC: 99 MMOL/L
CHOLEST SERPL-MCNC: 199 MG/DL
CO2 SERPL-SCNC: 23 MMOL/L
COLOR: YELLOW
CREAT SERPL-MCNC: 1.83 MG/DL
CYSTATIN C SERPL-MCNC: 1.84 MG/L
EGFR: 38 ML/MIN/1.73M2
EPITHELIAL CELLS: 0 /HPF
ESTIMATED AVERAGE GLUCOSE: 120 MG/DL
GFR/BSA.PRED SERPLBLD CYS-BASED-ARV: 33 ML/MIN/1.73M2
GLUCOSE QUALITATIVE U: NEGATIVE MG/DL
GLUCOSE SERPL-MCNC: 100 MG/DL
HBA1C MFR BLD HPLC: 5.8 %
HCT VFR BLD CALC: 42.8 %
HCV AB SER QL: NONREACTIVE
HCV S/CO RATIO: 0.11 S/CO
HDLC SERPL-MCNC: 56 MG/DL
HGB BLD-MCNC: 14 G/DL
INR PPP: 3.98 RATIO
KETONES URINE: NEGATIVE MG/DL
LDLC SERPL CALC-MCNC: 104 MG/DL
LEUKOCYTE ESTERASE URINE: NEGATIVE
MCHC RBC-ENTMCNC: 29.9 PG
MCHC RBC-ENTMCNC: 32.7 GM/DL
MCV RBC AUTO: 91.3 FL
MICROSCOPIC-UA: NORMAL
NITRITE URINE: NEGATIVE
NONHDLC SERPL-MCNC: 143 MG/DL
PH URINE: 6.5
PLATELET # BLD AUTO: 249 K/UL
POTASSIUM SERPL-SCNC: 4.9 MMOL/L
PROT SERPL-MCNC: 7.6 G/DL
PROTEIN URINE: 30 MG/DL
PSA SERPL-MCNC: 0.71 NG/ML
PT BLD: 43.2 SEC
RBC # BLD: 4.69 M/UL
RBC # FLD: 13.5 %
RED BLOOD CELLS URINE: 2 /HPF
SODIUM SERPL-SCNC: 136 MMOL/L
SPECIFIC GRAVITY URINE: 1.02
T4 FREE SERPL-MCNC: 1.7 NG/DL
TRIGL SERPL-MCNC: 228 MG/DL
TSH SERPL-ACNC: 3.89 UIU/ML
URATE SERPL-MCNC: 6.7 MG/DL
UROBILINOGEN URINE: 0.2 MG/DL
WBC # FLD AUTO: 6.67 K/UL
WHITE BLOOD CELLS URINE: 0 /HPF

## 2024-06-30 LAB
M TB IFN-G BLD-IMP: NEGATIVE
QUANTIFERON TB PLUS MITOGEN MINUS NIL: >10 IU/ML
QUANTIFERON TB PLUS NIL: 0.03 IU/ML
QUANTIFERON TB PLUS TB1 MINUS NIL: 0.01 IU/ML
QUANTIFERON TB PLUS TB2 MINUS NIL: 0.02 IU/ML

## 2024-07-26 ENCOUNTER — RX RENEWAL (OUTPATIENT)
Age: 74
End: 2024-07-26

## 2024-08-19 NOTE — ED ADULT NURSE NOTE - NS ED PATIENT SAFETY CONCERN
Care Transitions Note    Final Call     Patient Current Location:  [place]    Care Transition Nurse contacted the patient by telephone. Verified name and  as identifiers.        Advance Care Planning:   Does patient have an Advance Directive: reviewed during previous call, see note. .    Handoff:   Patient was not referred to the ACM team due to no additional needs identified.       Care Summary Note: CTN spoke with patient who reported she is doing ok. Patient has apt with nephrologist today at [facility]. Patient denied any other issues or concerns at this time.     Assessments:  Care Transitions Subsequent and Final Call    Subsequent and Final Calls  Do you have any ongoing symptoms?: No  Have your medications changed?: No  Do you have any questions related to your medications?: No  Do you currently have any active services?: No  Do you have any needs or concerns that I can assist you with?: No  Identified Barriers: None  Care Transitions Interventions  Other Interventions:              Upcoming Appointments:    Future Appointments         Provider Specialty Dept Phone    2024 3:15 PM Cathy Castellon MD Nephrology [phone]    2024 7:40 AM Eladio Osei MD Family Medicine [phone]            Patient has agreed to contact primary care provider and/or specialist for any further questions, concerns, or needs.    Sulma PEDRAZAN, RN, [facility]  Care Transition Nurse  [phone] mobile     
No

## 2024-08-29 ENCOUNTER — APPOINTMENT (OUTPATIENT)
Dept: OPHTHALMOLOGY | Facility: CLINIC | Age: 74
End: 2024-08-29
Payer: COMMERCIAL

## 2024-08-29 ENCOUNTER — NON-APPOINTMENT (OUTPATIENT)
Age: 74
End: 2024-08-29

## 2024-08-29 PROCEDURE — 92002 INTRM OPH EXAM NEW PATIENT: CPT

## 2024-08-29 PROCEDURE — 76514 ECHO EXAM OF EYE THICKNESS: CPT

## 2024-09-10 NOTE — PACU DISCHARGE NOTE - NAUSEA/VOMITING:
[Disease: _____________________] : Disease: [unfilled] [AJCC Stage: ____] : AJCC Stage: [unfilled] None [de-identified] : 51 y.o female with newly diagnosed with cervical cancer in [facility].  Ms. Sharma, 51 years old, accompanied by her daughter, Tammy and is referred by Dr. Charles, for HSIL (22 from [facility]) cervical mass, thickened endometrium noted on CT (from [facility]). Pathology report notes moderately differentiated squamous cell cancer.   Pt is without symptoms; she has not seen a GYN in over 10 years and recalls never having an abnormal pap smear. She returned home to the [facility] to help her mother with medical problems and decided to have a GYN checkup at that time. Pap smear returned HSIL / +HPV. Pathology noted SCC moderately differentiated and CT scan identified cervical mass; thickened lining of the uterus; lymphadenopathy as well as a right breast mass and right axillary lymphadenopathy.   She denies f/c/n/v/d/abnormal vaginal bleeding, changes to bowel or bladder habits. Denies unintentional weight loss or gain. Denies post-coital or intercycle vaginal bleeding; denies abdominal pain, distention, bloating, back pain or any other associated symptoms.   Imagin20 ([facility], [facility]) Breast: 1.6 x 1.7 cm tuberous, polycyclic contour, accumulating contrast medium, visualized at the borderline of the lower quadrants of the right breast, with no infiltration of the thoracic muscles.  Right axillary region: 1.4 x 1.0 cm; 1.0 x 1.0 cm and 0.8 x 1.1 cm. lymphadenopathy.  Liver: well defined even borders, the liver parenchyma is with signs of fat rearrangement, a hypdense inclusion 1.6 x 2.1 cm in size, is subcapsularly visualized Segment 8 of the liver. It lacunarly accumulates contrast medium, additionally accumulates contrast medium during the delayed phase. The intrahepatic and extrahepatic bile ducts are not dilated.  Abdominal lymph nodes: paraaortic and interaortocaval lymph nodes are sized 10-12 mm, weakly accumulate contrast medium.  No free fluid in the abdominal cavity.  Uterus: 5.9 x 7.5 cm.  EM: 15 mm with dense fluid visualized, mildly heterogeneous.  Cervix: tissue lesion 56 x 42 mm invading the uterine isthmus and body. The parametria are infiltrated.  Uterine appendages are normally positioned, a cystic inclusion of 3.3 x 3.5 cm is visualized in the projection of the left ovary.  Right Ovary: 2.0 x 2.8 cm.  Lymph Nodes: - right common iliac lymph node: 1.1 x 1.5 cm. - right pelvic wall node: 1.8 x 3.1 cm - left pelvic wall node: 2.4 x 3.0 cm.  - right external iliac lymph node: 1.8 x 1.6 cm   She was seen by Dr. Holm and had a biospy. Pathology: Squamous cell cancer. Patient had right breast biopsy yesterday, pending results.  SH: No smoking or drinking. one biological daughter and two sons adopted, no smoking. 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 All: NKDA PSH: None PMH: none FH: Father with stomach cancer at 68.  Menarche: 11 Menopause: still menstruating. LMP 21  She completed C6 of chemotherapy for cervical cancer on 23 with Cisplatin/Taxol/Bevacizumab/Pembrolizumab. Myesha d/c after 3 cycles due to mediport dehiscence. PET scan showed a good response to treatment and she is now going to start maintenance pembrolizumab.  Pet scan shows very good response in breast and lymph nodes from chemotherapy     (Cis/Taxol/Keytruda). Patient sees Dr. Perrin and Dr. Acosta for stage IIIC breast cancer.     Given Stage IV cervical cancer there is no indication for doing breast surgery at this time, as per the breast team. Patient started on Lupron and anastrozole.   She was on Keytruda maintenance from 23 through 24 (14 cycles) then was found to have POD. Of note she completed RT to cervix/pelvis total dose of 5936cGy in 28 fractions for increased adenopathy with Dr. Louis. Treatment was changed to Carboplatin retreat and completed 3 cycles 2024 [FreeTextEntry1] : [place] Carboplatin C2 - 8/7/24 [de-identified] : Patient is here for follow up after third cycle of carbo retreat accompanied by daughter.  Reports to be overall feeling well, no new complaints at this times. She denies abdominal pain, bloating, vomiting, bowel/bladder issues, numbness/tingling.

## 2024-10-08 ENCOUNTER — NON-APPOINTMENT (OUTPATIENT)
Age: 74
End: 2024-10-08

## 2024-10-08 ENCOUNTER — APPOINTMENT (OUTPATIENT)
Dept: CARDIOLOGY | Facility: CLINIC | Age: 74
End: 2024-10-08

## 2024-10-08 VITALS
OXYGEN SATURATION: 96 % | BODY MASS INDEX: 25.83 KG/M2 | DIASTOLIC BLOOD PRESSURE: 73 MMHG | SYSTOLIC BLOOD PRESSURE: 120 MMHG | WEIGHT: 155 LBS | HEIGHT: 65 IN | HEART RATE: 65 BPM

## 2024-10-08 PROCEDURE — 99213 OFFICE O/P EST LOW 20 MIN: CPT | Mod: 25

## 2024-10-08 PROCEDURE — 93000 ELECTROCARDIOGRAM COMPLETE: CPT

## 2024-10-15 NOTE — PATIENT PROFILE ADULT - DO YOU FEEL UNSAFE AT HOME, WORK, OR SCHOOL?
----- Message from Delia sent at 10/15/2024 12:11 PM CDT -----  Contact: [phone] .[email or web address]     Patient is calling for Medical Advice regarding: Patient states he is going on a cruise on Monday and would like to know if he can be seen by Friday after 11:00 am.    How long has patient had these symptoms: n/a    Pharmacy name and phone#:n/a    Patient wants a call back or thru [facility]: call back     Comments:    Please advise patient replies from provider may take up to 48 hours.   no

## 2024-11-08 ENCOUNTER — RX RENEWAL (OUTPATIENT)
Age: 74
End: 2024-11-08

## 2024-12-05 ENCOUNTER — APPOINTMENT (OUTPATIENT)
Dept: NEPHROLOGY | Facility: CLINIC | Age: 74
End: 2024-12-05
Payer: COMMERCIAL

## 2024-12-05 VITALS
BODY MASS INDEX: 25.71 KG/M2 | WEIGHT: 154.32 LBS | DIASTOLIC BLOOD PRESSURE: 75 MMHG | HEIGHT: 65 IN | OXYGEN SATURATION: 99 % | HEART RATE: 59 BPM | TEMPERATURE: 97.6 F | SYSTOLIC BLOOD PRESSURE: 146 MMHG

## 2024-12-05 DIAGNOSIS — Z86.79 PERSONAL HISTORY OF OTHER DISEASES OF THE CIRCULATORY SYSTEM: ICD-10-CM

## 2024-12-05 DIAGNOSIS — N18.30 CHRONIC KIDNEY DISEASE, STAGE 3 UNSPECIFIED: ICD-10-CM

## 2024-12-05 PROCEDURE — G2211 COMPLEX E/M VISIT ADD ON: CPT | Mod: NC

## 2024-12-05 PROCEDURE — 99213 OFFICE O/P EST LOW 20 MIN: CPT

## 2024-12-09 ENCOUNTER — OUTPATIENT (OUTPATIENT)
Dept: OUTPATIENT SERVICES | Facility: HOSPITAL | Age: 74
LOS: 1 days | End: 2024-12-09

## 2024-12-09 ENCOUNTER — APPOINTMENT (OUTPATIENT)
Dept: INTERNAL MEDICINE | Facility: CLINIC | Age: 74
End: 2024-12-09

## 2025-01-05 ENCOUNTER — EMERGENCY (EMERGENCY)
Facility: HOSPITAL | Age: 75
LOS: 1 days | Discharge: HOME | End: 2025-01-05
Attending: EMERGENCY MEDICINE
Payer: COMMERCIAL

## 2025-01-05 VITALS
HEART RATE: 93 BPM | DIASTOLIC BLOOD PRESSURE: 87 MMHG | TEMPERATURE: 98 F | OXYGEN SATURATION: 96 % | SYSTOLIC BLOOD PRESSURE: 175 MMHG | WEIGHT: 151.9 LBS | HEIGHT: 65 IN | RESPIRATION RATE: 20 BRPM

## 2025-01-05 VITALS
SYSTOLIC BLOOD PRESSURE: 182 MMHG | DIASTOLIC BLOOD PRESSURE: 85 MMHG | HEART RATE: 60 BPM | OXYGEN SATURATION: 99 % | RESPIRATION RATE: 17 BRPM

## 2025-01-05 DIAGNOSIS — Z90.49 ACQUIRED ABSENCE OF OTHER SPECIFIED PARTS OF DIGESTIVE TRACT: Chronic | ICD-10-CM

## 2025-01-05 PROCEDURE — 99284 EMERGENCY DEPT VISIT MOD MDM: CPT

## 2025-01-05 PROCEDURE — 99285 EMERGENCY DEPT VISIT HI MDM: CPT | Mod: 25

## 2025-01-05 RX ORDER — ONDANSETRON 4 MG/1
1 TABLET ORAL
Qty: 12 | Refills: 0
Start: 2025-01-05

## 2025-01-05 RX ORDER — MECLIZINE HYDROCHLORIDE 25 MG/1
50 TABLET ORAL ONCE
Refills: 0 | Status: COMPLETED | OUTPATIENT
Start: 2025-01-05 | End: 2025-01-05

## 2025-01-05 RX ORDER — ONDANSETRON 4 MG/1
8 TABLET ORAL ONCE
Refills: 0 | Status: COMPLETED | OUTPATIENT
Start: 2025-01-05 | End: 2025-01-05

## 2025-01-05 RX ORDER — MECLIZINE HYDROCHLORIDE 25 MG/1
1 TABLET ORAL
Qty: 12 | Refills: 0
Start: 2025-01-05

## 2025-01-05 RX ADMIN — ONDANSETRON 8 MILLIGRAM(S): 4 TABLET ORAL at 08:58

## 2025-01-05 RX ADMIN — MECLIZINE HYDROCHLORIDE 50 MILLIGRAM(S): 25 TABLET ORAL at 08:58

## 2025-01-05 NOTE — ED PROVIDER NOTE - PATIENT PORTAL LINK FT
You can access the FollowMyHealth Patient Portal offered by [facility] by registering at the following website: [email or web address]. By joining [facility]’s FollowMyHealth portal, you will also be able to view your health information using other applications (apps) compatible with our system.

## 2025-01-05 NOTE — ED PROVIDER NOTE - NSFOLLOWUPCLINICS_GEN_ALL_ED_FT
[facility] - ENT  Otolaryngology (ENT)  [address] [place] [address]  Phone: [phone]  Fax:

## 2025-01-05 NOTE — ED PROVIDER NOTE - NSTIMEPROVIDERCAREINITIATE_GEN_ER
05-Jan-2025 08:26 Cheek To Nose Interpolation Flap Division And Inset Text: Division and inset of the cheek to nose interpolation flap was performed to achieve optimal aesthetic result, restore normal anatomic appearance and avoid distortion of normal anatomy, expedite and facilitate wound healing, achieve optimal functional result and because linear closure either not possible or would produce suboptimal result. The patient was prepped and draped in the usual manner. The pedicle was infiltrated with local anesthesia. The pedicle was sectioned with a #15 blade. The pedicle was de-bulked and trimmed to match the shape of the defect. Hemostasis was achieved. The flap donor site and free margin of the flap were secured with deep buried sutures and the wound edges were re-approximated.

## 2025-01-05 NOTE — ED PROVIDER NOTE - CARE PLAN
1 Principal Discharge DX:	Peripheral vertigo  Secondary Diagnosis:	Acute effusion of left ear   Principal Discharge DX:	Peripheral vertigo  Secondary Diagnosis:	Acute effusion of left ear  Secondary Diagnosis:	Asymptomatic hypertension

## 2025-01-05 NOTE — ED PROVIDER NOTE - PHYSICAL EXAMINATION
Well Appearing, Nontoxic, NAD;  Symm Facies, PERRL 3mm, (-)Pallor, Anicteric, R TM clear, L TM w/ clear effusion +hearing intact, MMM;  No rash;  AOX3, Normal speech, normal strength/sensation/gait

## 2025-01-05 NOTE — ED ADULT NURSE NOTE - OBJECTIVE STATEMENT
74y M PMH afib, pacemaker on Coumadin presents to the ED c/o dizziness. Pt reports on Thursday, he hit his head on  the car trim. Pt reports some dizziness and nausea that started on Saturday. Pt denies syncope, chest pain, sob, diarrhea, fevers, chills. Cardiac monitor placed (paced). Vs documented. Comfort and safety maintained. family at bedside.

## 2025-01-05 NOTE — ED ADULT NURSE NOTE - NSFALLUNIVINTERV_ED_ALL_ED
Bed/Stretcher in lowest position, wheels locked, appropriate side rails in place/Call bell, personal items and telephone in reach/Instruct patient to call for assistance before getting out of bed/chair/stretcher/Non-slip footwear applied when patient is off stretcher/[place] to call system/Physically safe environment - no spills, clutter or unnecessary equipment/Purposeful proactive rounding/Room/bathroom lighting operational, light cord in reach

## 2025-01-05 NOTE — ED PROVIDER NOTE - NSFOLLOWUPINSTRUCTIONS_ED_ALL_ED_FT
See your Primary Doctor and ENT CLINIC (rapid referral) next week -- call to discuss.    Stay well hydrated, eat regular healthy meals, get plenty of rest, eat low salt/sodium diet.    Use MECLIZINE (vertigo) and ZOFRAN ODT (nausea) as directed -- see medication warnings.    See PERIPHERAL VERTIGO information and return instructions given to you.    Seek immediate medical care for new/worsening symptoms/concerns.

## 2025-01-05 NOTE — ED PROVIDER NOTE - CLINICAL SUMMARY MEDICAL DECISION MAKING FREE TEXT BOX
------------ATTENDING NOTE------------  pt w/ wife c/o irritation / popping in L ear w/ mild dull ache over past 3 days since being on plane ------------ATTENDING NOTE------------  pt w/ wife c/o irritation / popping in L ear w/ mild dull ache over past 3 days since being on plane, past day w/ waxing/waning intermittent vertigo (describes as spinning), slight nausea, improves w/ staying still closing eyes, no additional numbness/weakness or loss of function, tolerating PO, no chest pain/discomfort or sob/dyspnea or abdominal pain, SBPs 160s at rest, in depth dw all about ddx, tx, iglesias, continued close outpt fu.  - Brandt Shaw MD   -------------------------------------------------------

## 2025-01-05 NOTE — ED ADULT TRIAGE NOTE - CHIEF COMPLAINT QUOTE
woke up in the morning yesterday 0700 & feeling dizziness when standing up & walking. Denies change in vision. GCS-15. Noted his BP being on 170s & 180s systolic kylee this morning. S/p head injury from thursday. Hx pacemaker, on coumadin.

## 2025-01-08 ENCOUNTER — RX RENEWAL (OUTPATIENT)
Age: 75
End: 2025-01-08

## 2025-01-10 ENCOUNTER — APPOINTMENT (OUTPATIENT)
Dept: OTOLARYNGOLOGY | Facility: CLINIC | Age: 75
End: 2025-01-10
Payer: COMMERCIAL

## 2025-01-10 ENCOUNTER — NON-APPOINTMENT (OUTPATIENT)
Age: 75
End: 2025-01-10

## 2025-01-10 VITALS
HEIGHT: 65 IN | DIASTOLIC BLOOD PRESSURE: 69 MMHG | BODY MASS INDEX: 24.99 KG/M2 | WEIGHT: 150 LBS | HEART RATE: 60 BPM | SYSTOLIC BLOOD PRESSURE: 109 MMHG

## 2025-01-10 DIAGNOSIS — Z82.2 FAMILY HISTORY OF DEAFNESS AND HEARING LOSS: ICD-10-CM

## 2025-01-10 DIAGNOSIS — R42 DIZZINESS AND GIDDINESS: ICD-10-CM

## 2025-01-10 DIAGNOSIS — Z82.49 FAMILY HISTORY OF ISCHEMIC HEART DISEASE AND OTHER DISEASES OF THE CIRCULATORY SYSTEM: ICD-10-CM

## 2025-01-10 DIAGNOSIS — H69.90 UNSPECIFIED EUSTACHIAN TUBE DISORDER, UNSPECIFIED EAR: ICD-10-CM

## 2025-01-10 PROCEDURE — 92567 TYMPANOMETRY: CPT

## 2025-01-10 PROCEDURE — 99203 OFFICE O/P NEW LOW 30 MIN: CPT

## 2025-01-10 PROCEDURE — 92557 COMPREHENSIVE HEARING TEST: CPT

## 2025-01-10 RX ORDER — MECLIZINE HYDROCHLORIDE 12.5 MG/1
12.5 TABLET ORAL DAILY
Qty: 30 | Refills: 2 | Status: ACTIVE | COMMUNITY
Start: 2025-01-10 | End: 1900-01-01

## 2025-02-01 ENCOUNTER — APPOINTMENT (OUTPATIENT)
Dept: ENDOCRINOLOGY | Facility: CLINIC | Age: 75
End: 2025-02-01
Payer: COMMERCIAL

## 2025-02-01 VITALS
HEART RATE: 72 BPM | TEMPERATURE: 97.7 F | SYSTOLIC BLOOD PRESSURE: 134 MMHG | BODY MASS INDEX: 25.66 KG/M2 | DIASTOLIC BLOOD PRESSURE: 76 MMHG | WEIGHT: 154 LBS | OXYGEN SATURATION: 99 % | HEIGHT: 65 IN

## 2025-02-01 DIAGNOSIS — E55.9 VITAMIN D DEFICIENCY, UNSPECIFIED: ICD-10-CM

## 2025-02-01 DIAGNOSIS — M85.80 OTHER SPECIFIED DISORDERS OF BONE DENSITY AND STRUCTURE, UNSPECIFIED SITE: ICD-10-CM

## 2025-02-01 DIAGNOSIS — E78.5 HYPERLIPIDEMIA, UNSPECIFIED: ICD-10-CM

## 2025-02-01 DIAGNOSIS — B20 HUMAN IMMUNODEFICIENCY VIRUS [HIV] DISEASE: ICD-10-CM

## 2025-02-01 DIAGNOSIS — M1A.9XX0 CHRONIC GOUT, UNSPECIFIED, W/OUT TOPHUS (TOPHI): ICD-10-CM

## 2025-02-01 DIAGNOSIS — E03.9 HYPOTHYROIDISM, UNSPECIFIED: ICD-10-CM

## 2025-02-01 DIAGNOSIS — E04.2 NONTOXIC MULTINODULAR GOITER: ICD-10-CM

## 2025-02-01 PROCEDURE — 99204 OFFICE O/P NEW MOD 45 MIN: CPT

## 2025-02-01 PROCEDURE — G2211 COMPLEX E/M VISIT ADD ON: CPT | Mod: NC

## 2025-02-07 ENCOUNTER — OUTPATIENT (OUTPATIENT)
Dept: OUTPATIENT SERVICES | Facility: HOSPITAL | Age: 75
LOS: 1 days | End: 2025-02-07
Payer: COMMERCIAL

## 2025-02-07 ENCOUNTER — APPOINTMENT (OUTPATIENT)
Dept: RADIOLOGY | Facility: CLINIC | Age: 75
End: 2025-02-07
Payer: COMMERCIAL

## 2025-02-07 ENCOUNTER — APPOINTMENT (OUTPATIENT)
Dept: ULTRASOUND IMAGING | Facility: CLINIC | Age: 75
End: 2025-02-07
Payer: COMMERCIAL

## 2025-02-07 DIAGNOSIS — Z00.8 ENCOUNTER FOR OTHER GENERAL EXAMINATION: ICD-10-CM

## 2025-02-07 DIAGNOSIS — E04.2 NONTOXIC MULTINODULAR GOITER: ICD-10-CM

## 2025-02-07 DIAGNOSIS — R73.03 PREDIABETES.: ICD-10-CM

## 2025-02-07 LAB
25(OH)D3 SERPL-MCNC: 35.7 NG/ML
ALBUMIN SERPL ELPH-MCNC: 4.2 G/DL
ALP BLD-CCNC: 37 U/L
ALT SERPL-CCNC: 12 U/L
ANION GAP SERPL CALC-SCNC: 19 MMOL/L
AST SERPL-CCNC: 21 U/L
BILIRUB SERPL-MCNC: 0.4 MG/DL
BUN SERPL-MCNC: 38 MG/DL
CALCIUM SERPL-MCNC: 9.4 MG/DL
CALCIUM SERPL-MCNC: 9.4 MG/DL
CHLORIDE SERPL-SCNC: 106 MMOL/L
CHOLEST SERPL-MCNC: 199 MG/DL
CO2 SERPL-SCNC: 20 MMOL/L
CREAT SERPL-MCNC: 1.83 MG/DL
EGFR: 38 ML/MIN/1.73M2
ERYTHROCYTE [SEDIMENTATION RATE] IN BLOOD BY WESTERGREN METHOD: 10 MM/HR
ESTIMATED AVERAGE GLUCOSE: 128 MG/DL
GLUCOSE SERPL-MCNC: 100 MG/DL
HBA1C MFR BLD HPLC: 6.1 %
HDLC SERPL-MCNC: 45 MG/DL
LDLC SERPL DIRECT ASSAY-MCNC: 104 MG/DL
MAGNESIUM SERPL-MCNC: 1.9 MG/DL
PARATHYROID HORMONE INTACT: 23 PG/ML
PHOSPHATE SERPL-MCNC: 2.8 MG/DL
POTASSIUM SERPL-SCNC: 4.7 MMOL/L
PROT SERPL-MCNC: 7 G/DL
SODIUM SERPL-SCNC: 146 MMOL/L
T3FREE SERPL-MCNC: 2.97 PG/ML
T4 FREE SERPL-MCNC: 1.6 NG/DL
TRIGL SERPL-MCNC: 213 MG/DL
TSH SERPL-ACNC: 3.92 UIU/ML
URATE SERPL-MCNC: 5.3 MG/DL
VIT B12 SERPL-MCNC: 500 PG/ML

## 2025-02-07 PROCEDURE — 76536 US EXAM OF HEAD AND NECK: CPT

## 2025-02-07 PROCEDURE — 76536 US EXAM OF HEAD AND NECK: CPT | Mod: 26

## 2025-02-07 PROCEDURE — 77080 DXA BONE DENSITY AXIAL: CPT | Mod: 26

## 2025-02-07 PROCEDURE — 77080 DXA BONE DENSITY AXIAL: CPT

## 2025-02-13 ENCOUNTER — RX RENEWAL (OUTPATIENT)
Age: 75
End: 2025-02-13

## 2025-02-20 ENCOUNTER — APPOINTMENT (OUTPATIENT)
Dept: OPHTHALMOLOGY | Facility: CLINIC | Age: 75
End: 2025-02-20

## 2025-02-24 ENCOUNTER — TRANSCRIPTION ENCOUNTER (OUTPATIENT)
Age: 75
End: 2025-02-24

## 2025-02-24 ENCOUNTER — APPOINTMENT (OUTPATIENT)
Dept: OTOLARYNGOLOGY | Facility: CLINIC | Age: 75
End: 2025-02-24
Payer: COMMERCIAL

## 2025-02-24 PROCEDURE — 92540 BASIC VESTIBULAR EVALUATION: CPT

## 2025-02-24 PROCEDURE — 92537 CALORIC VSTBLR TEST W/REC: CPT

## 2025-02-25 ENCOUNTER — TRANSCRIPTION ENCOUNTER (OUTPATIENT)
Age: 75
End: 2025-02-25

## 2025-02-26 ENCOUNTER — TRANSCRIPTION ENCOUNTER (OUTPATIENT)
Age: 75
End: 2025-02-26

## 2025-02-28 ENCOUNTER — APPOINTMENT (OUTPATIENT)
Dept: ELECTROPHYSIOLOGY | Facility: CLINIC | Age: 75
End: 2025-02-28

## 2025-02-28 VITALS
SYSTOLIC BLOOD PRESSURE: 136 MMHG | DIASTOLIC BLOOD PRESSURE: 76 MMHG | BODY MASS INDEX: 25.29 KG/M2 | HEART RATE: 61 BPM | WEIGHT: 152 LBS | OXYGEN SATURATION: 98 %

## 2025-02-28 PROCEDURE — 93280 PM DEVICE PROGR EVAL DUAL: CPT

## 2025-02-28 PROCEDURE — 93000 ELECTROCARDIOGRAM COMPLETE: CPT | Mod: 59

## 2025-03-03 ENCOUNTER — NON-APPOINTMENT (OUTPATIENT)
Age: 75
End: 2025-03-03

## 2025-03-17 ENCOUNTER — APPOINTMENT (OUTPATIENT)
Dept: INFECTIOUS DISEASE | Facility: CLINIC | Age: 75
End: 2025-03-17
Payer: COMMERCIAL

## 2025-03-17 VITALS
DIASTOLIC BLOOD PRESSURE: 70 MMHG | HEART RATE: 58 BPM | HEIGHT: 65 IN | BODY MASS INDEX: 26.82 KG/M2 | WEIGHT: 161 LBS | SYSTOLIC BLOOD PRESSURE: 117 MMHG | TEMPERATURE: 97.8 F | OXYGEN SATURATION: 98 %

## 2025-03-17 DIAGNOSIS — Z12.5 ENCOUNTER FOR SCREENING FOR MALIGNANT NEOPLASM OF PROSTATE: ICD-10-CM

## 2025-03-17 DIAGNOSIS — Z01.00 ENCOUNTER FOR EXAMINATION OF EYES AND VISION W/OUT ABNORMAL FINDINGS: ICD-10-CM

## 2025-03-17 DIAGNOSIS — E78.5 HYPERLIPIDEMIA, UNSPECIFIED: ICD-10-CM

## 2025-03-17 DIAGNOSIS — E03.9 HYPOTHYROIDISM, UNSPECIFIED: ICD-10-CM

## 2025-03-17 DIAGNOSIS — N18.30 CHRONIC KIDNEY DISEASE, STAGE 3 UNSPECIFIED: ICD-10-CM

## 2025-03-17 DIAGNOSIS — B20 HUMAN IMMUNODEFICIENCY VIRUS [HIV] DISEASE: ICD-10-CM

## 2025-03-17 DIAGNOSIS — Z01.20 ENCOUNTER FOR DENTAL EXAMINATION AND CLEANING W/OUT ABNORMAL FINDINGS: ICD-10-CM

## 2025-03-17 PROCEDURE — 90472 IMMUNIZATION ADMIN EACH ADD: CPT

## 2025-03-17 PROCEDURE — 90684 PCV21 VACCINE IM: CPT

## 2025-03-17 PROCEDURE — 96156 HLTH BHV ASSMT/REASSESSMENT: CPT | Mod: 1L

## 2025-03-17 PROCEDURE — 99214 OFFICE O/P EST MOD 30 MIN: CPT | Mod: 25

## 2025-03-17 PROCEDURE — 90678 RSV VACC PREF BIVALENT IM: CPT

## 2025-03-17 PROCEDURE — G0009: CPT

## 2025-03-17 RX ORDER — TADALAFIL 10 MG/1
10 TABLET, FILM COATED ORAL
Qty: 6 | Refills: 0 | Status: COMPLETED | COMMUNITY
Start: 2024-12-09

## 2025-03-17 RX ORDER — ONDANSETRON 8 MG/1
8 TABLET, ORALLY DISINTEGRATING ORAL
Qty: 12 | Refills: 0 | Status: COMPLETED | COMMUNITY
Start: 2025-01-05

## 2025-03-18 DIAGNOSIS — Z79.01 LONG TERM (CURRENT) USE OF ANTICOAGULANTS: ICD-10-CM

## 2025-03-18 LAB
CD3 CELLS # BLD: 853 CELLS/UL
CD3 CELLS NFR BLD: 62 %
CD3+CD4+ CELLS # BLD: 413 CELLS/UL
CD3+CD4+ CELLS NFR BLD: 30 %
CD3+CD4+ CELLS/CD3+CD8+ CLL SPEC: 1 RATIO
CD3+CD8+ CELLS # SPEC: 413 CELLS/UL
CD3+CD8+ CELLS NFR BLD: 30 %
CYSTATIN C SERPL-MCNC: 1.68 MG/L
GFR/BSA.PRED SERPLBLD CYS-BASED-ARV: 37 ML/MIN/1.73M2
HIV1 RNA # SERPL NAA+PROBE: NORMAL
HIV1 RNA # SERPL NAA+PROBE: NORMAL COPIES/ML
INR PPP: 3.57 RATIO
PSA SERPL-MCNC: 0.59 NG/ML
PT BLD: 42 SEC
VIRAL LOAD INTERP: NORMAL
VIRAL LOAD LOG: NORMAL LG COP/ML

## 2025-03-25 LAB
INR PPP: 2.51 RATIO
PT BLD: 29.4 SEC

## 2025-04-07 ENCOUNTER — APPOINTMENT (OUTPATIENT)
Dept: INTERNAL MEDICINE | Facility: CLINIC | Age: 75
End: 2025-04-07

## 2025-04-15 ENCOUNTER — NON-APPOINTMENT (OUTPATIENT)
Age: 75
End: 2025-04-15

## 2025-04-15 ENCOUNTER — APPOINTMENT (OUTPATIENT)
Dept: CARDIOLOGY | Facility: CLINIC | Age: 75
End: 2025-04-15
Payer: COMMERCIAL

## 2025-04-15 VITALS
OXYGEN SATURATION: 98 % | SYSTOLIC BLOOD PRESSURE: 136 MMHG | WEIGHT: 161 LBS | HEART RATE: 69 BPM | BODY MASS INDEX: 26.79 KG/M2 | DIASTOLIC BLOOD PRESSURE: 66 MMHG

## 2025-04-15 DIAGNOSIS — Z86.79 PERSONAL HISTORY OF OTHER DISEASES OF THE CIRCULATORY SYSTEM: ICD-10-CM

## 2025-04-15 DIAGNOSIS — I70.90 UNSPECIFIED ATHEROSCLEROSIS: ICD-10-CM

## 2025-04-15 DIAGNOSIS — I48.92 UNSPECIFIED ATRIAL FLUTTER: ICD-10-CM

## 2025-04-15 DIAGNOSIS — I48.91 UNSPECIFIED ATRIAL FIBRILLATION: ICD-10-CM

## 2025-04-15 PROCEDURE — 93000 ELECTROCARDIOGRAM COMPLETE: CPT

## 2025-04-15 PROCEDURE — 99214 OFFICE O/P EST MOD 30 MIN: CPT | Mod: 25

## 2025-05-01 ENCOUNTER — LABORATORY RESULT (OUTPATIENT)
Age: 75
End: 2025-05-01

## 2025-05-14 ENCOUNTER — RX RENEWAL (OUTPATIENT)
Age: 75
End: 2025-05-14

## 2025-06-05 ENCOUNTER — APPOINTMENT (OUTPATIENT)
Dept: NEPHROLOGY | Facility: CLINIC | Age: 75
End: 2025-06-05
Payer: COMMERCIAL

## 2025-06-05 VITALS
DIASTOLIC BLOOD PRESSURE: 79 MMHG | SYSTOLIC BLOOD PRESSURE: 137 MMHG | WEIGHT: 160 LBS | HEIGHT: 65 IN | HEART RATE: 60 BPM | OXYGEN SATURATION: 97 % | TEMPERATURE: 97.3 F | BODY MASS INDEX: 26.66 KG/M2

## 2025-06-05 DIAGNOSIS — Z86.79 PERSONAL HISTORY OF OTHER DISEASES OF THE CIRCULATORY SYSTEM: ICD-10-CM

## 2025-06-05 DIAGNOSIS — N18.30 CHRONIC KIDNEY DISEASE, STAGE 3 UNSPECIFIED: ICD-10-CM

## 2025-06-05 PROCEDURE — G2211 COMPLEX E/M VISIT ADD ON: CPT | Mod: NC

## 2025-06-05 PROCEDURE — 99213 OFFICE O/P EST LOW 20 MIN: CPT

## 2025-06-06 ENCOUNTER — OUTPATIENT (OUTPATIENT)
Dept: OUTPATIENT SERVICES | Facility: HOSPITAL | Age: 75
LOS: 1 days | End: 2025-06-06

## 2025-06-06 ENCOUNTER — APPOINTMENT (OUTPATIENT)
Dept: INTERNAL MEDICINE | Facility: CLINIC | Age: 75
End: 2025-06-06

## 2025-06-06 DIAGNOSIS — Z90.49 ACQUIRED ABSENCE OF OTHER SPECIFIED PARTS OF DIGESTIVE TRACT: Chronic | ICD-10-CM

## 2025-06-06 LAB
APPEARANCE: CLEAR
BACTERIA: NEGATIVE /HPF
BILIRUBIN URINE: NEGATIVE
BLOOD URINE: NEGATIVE
CAST: 0 /LPF
COLOR: YELLOW
CREAT SPEC-SCNC: 48 MG/DL
CREAT SPEC-SCNC: 48 MG/DL
CREAT/PROT UR: 0.3 RATIO
EPITHELIAL CELLS: 0 /HPF
GLUCOSE QUALITATIVE U: 100 MG/DL
KETONES URINE: NEGATIVE MG/DL
LEUKOCYTE ESTERASE URINE: NEGATIVE
MICROALBUMIN 24H UR DL<=1MG/L-MCNC: 5.5 MG/DL
MICROALBUMIN/CREAT 24H UR-RTO: 115 MG/G
MICROSCOPIC-UA: NORMAL
NITRITE URINE: NEGATIVE
PH URINE: 6
PROT UR-MCNC: 12 MG/DL
PROTEIN URINE: NEGATIVE MG/DL
RED BLOOD CELLS URINE: 0 /HPF
SPECIFIC GRAVITY URINE: 1.01
UROBILINOGEN URINE: 0.2 MG/DL
WHITE BLOOD CELLS URINE: 0 /HPF

## 2025-06-09 ENCOUNTER — TRANSCRIPTION ENCOUNTER (OUTPATIENT)
Age: 75
End: 2025-06-09

## 2025-07-10 ENCOUNTER — APPOINTMENT (OUTPATIENT)
Dept: RHEUMATOLOGY | Facility: CLINIC | Age: 75
End: 2025-07-10
Payer: COMMERCIAL

## 2025-07-10 VITALS
DIASTOLIC BLOOD PRESSURE: 78 MMHG | SYSTOLIC BLOOD PRESSURE: 152 MMHG | WEIGHT: 155 LBS | HEIGHT: 65 IN | BODY MASS INDEX: 25.83 KG/M2 | RESPIRATION RATE: 16 BRPM | HEART RATE: 60 BPM | OXYGEN SATURATION: 98 %

## 2025-07-10 PROBLEM — M10.9 GOUT, UNSPECIFIED CAUSE, UNSPECIFIED CHRONICITY, UNSPECIFIED SITE: Status: ACTIVE | Noted: 2025-07-10

## 2025-07-10 PROCEDURE — 99204 OFFICE O/P NEW MOD 45 MIN: CPT

## 2025-07-15 LAB
ALBUMIN SERPL ELPH-MCNC: 3.9 G/DL
ALP BLD-CCNC: 35 U/L
ALT SERPL-CCNC: 14 U/L
ANION GAP SERPL CALC-SCNC: 11 MMOL/L
AST SERPL-CCNC: 29 U/L
BASOPHILS # BLD AUTO: 0.03 K/UL
BASOPHILS NFR BLD AUTO: 0.6 %
BILIRUB SERPL-MCNC: 0.5 MG/DL
BUN SERPL-MCNC: 34 MG/DL
CALCIUM SERPL-MCNC: 9.3 MG/DL
CHLORIDE SERPL-SCNC: 108 MMOL/L
CO2 SERPL-SCNC: 22 MMOL/L
CREAT SERPL-MCNC: 1.72 MG/DL
EGFRCR SERPLBLD CKD-EPI 2021: 41 ML/MIN/1.73M2
EOSINOPHIL # BLD AUTO: 0.14 K/UL
EOSINOPHIL NFR BLD AUTO: 3 %
HCT VFR BLD CALC: 40.2 %
HGB BLD-MCNC: 13 G/DL
IMM GRANULOCYTES NFR BLD AUTO: 0.2 %
LYMPHOCYTES # BLD AUTO: 1.35 K/UL
LYMPHOCYTES NFR BLD AUTO: 28.7 %
MAN DIFF?: NORMAL
MCHC RBC-ENTMCNC: 29.5 PG
MCHC RBC-ENTMCNC: 32.3 G/DL
MCV RBC AUTO: 91.4 FL
MONOCYTES # BLD AUTO: 0.35 K/UL
MONOCYTES NFR BLD AUTO: 7.4 %
NEUTROPHILS # BLD AUTO: 2.82 K/UL
NEUTROPHILS NFR BLD AUTO: 60.1 %
PLATELET # BLD AUTO: 179 K/UL
POTASSIUM SERPL-SCNC: 4.7 MMOL/L
PROT SERPL-MCNC: 6.7 G/DL
RBC # BLD: 4.4 M/UL
RBC # FLD: 13.8 %
SODIUM SERPL-SCNC: 140 MMOL/L
URATE SERPL-MCNC: 6.1 MG/DL
WBC # FLD AUTO: 4.7 K/UL

## 2025-07-16 ENCOUNTER — TRANSCRIPTION ENCOUNTER (OUTPATIENT)
Age: 75
End: 2025-07-16

## 2025-07-24 ENCOUNTER — APPOINTMENT (OUTPATIENT)
Dept: RADIOLOGY | Facility: CLINIC | Age: 75
End: 2025-07-24
Payer: COMMERCIAL

## 2025-07-24 ENCOUNTER — APPOINTMENT (OUTPATIENT)
Dept: ULTRASOUND IMAGING | Facility: CLINIC | Age: 75
End: 2025-07-24
Payer: COMMERCIAL

## 2025-07-24 ENCOUNTER — OUTPATIENT (OUTPATIENT)
Dept: OUTPATIENT SERVICES | Facility: HOSPITAL | Age: 75
LOS: 1 days | End: 2025-07-24
Payer: COMMERCIAL

## 2025-07-24 DIAGNOSIS — Z00.8 ENCOUNTER FOR OTHER GENERAL EXAMINATION: ICD-10-CM

## 2025-07-24 DIAGNOSIS — Z90.49 ACQUIRED ABSENCE OF OTHER SPECIFIED PARTS OF DIGESTIVE TRACT: Chronic | ICD-10-CM

## 2025-07-24 PROCEDURE — 73130 X-RAY EXAM OF HAND: CPT | Mod: 26,50

## 2025-07-24 PROCEDURE — 73630 X-RAY EXAM OF FOOT: CPT | Mod: 26,50

## 2025-07-24 PROCEDURE — 76770 US EXAM ABDO BACK WALL COMP: CPT

## 2025-07-24 PROCEDURE — 76770 US EXAM ABDO BACK WALL COMP: CPT | Mod: 26

## 2025-07-24 PROCEDURE — 73130 X-RAY EXAM OF HAND: CPT

## 2025-07-24 PROCEDURE — 73630 X-RAY EXAM OF FOOT: CPT

## 2025-08-07 ENCOUNTER — APPOINTMENT (OUTPATIENT)
Dept: ENDOCRINOLOGY | Facility: CLINIC | Age: 75
End: 2025-08-07

## 2025-08-18 ENCOUNTER — TRANSCRIPTION ENCOUNTER (OUTPATIENT)
Age: 75
End: 2025-08-18

## 2025-09-11 ENCOUNTER — APPOINTMENT (OUTPATIENT)
Dept: ELECTROPHYSIOLOGY | Facility: CLINIC | Age: 75
End: 2025-09-11

## 2025-09-18 ENCOUNTER — LABORATORY RESULT (OUTPATIENT)
Age: 75
End: 2025-09-18